# Patient Record
Sex: FEMALE | Race: WHITE | NOT HISPANIC OR LATINO | Employment: OTHER | URBAN - METROPOLITAN AREA
[De-identification: names, ages, dates, MRNs, and addresses within clinical notes are randomized per-mention and may not be internally consistent; named-entity substitution may affect disease eponyms.]

---

## 2017-03-01 ENCOUNTER — TRANSFERRED RECORDS (OUTPATIENT)
Dept: FAMILY MEDICINE | Facility: CLINIC | Age: 47
End: 2017-03-01

## 2017-03-02 LAB — CALCIUM SERPL-MCNC: 9.6 MG/DL (ref 8.6–10.2)

## 2017-03-04 ENCOUNTER — TRANSFERRED RECORDS (OUTPATIENT)
Dept: FAMILY MEDICINE | Facility: CLINIC | Age: 47
End: 2017-03-04

## 2017-03-07 ENCOUNTER — TRANSFERRED RECORDS (OUTPATIENT)
Dept: FAMILY MEDICINE | Facility: CLINIC | Age: 47
End: 2017-03-07

## 2017-03-13 ENCOUNTER — TRANSFERRED RECORDS (OUTPATIENT)
Dept: FAMILY MEDICINE | Facility: CLINIC | Age: 47
End: 2017-03-13

## 2017-03-13 LAB
25 OH VIT D TOTAL: 36 (ref 30–100)
ALT SERPL-CCNC: 10 U/L (ref 0–32)
AST SERPL-CCNC: 10 U/L (ref 0–40)
BASOPHILS NFR BLD AUTO: 1 %
EOSINOPHIL NFR BLD AUTO: 2 %
ERYTHROCYTE [DISTWIDTH] IN BLOOD BY AUTOMATED COUNT: 15 %
HCT VFR BLD AUTO: 37.9 % (ref 35–47)
HEMOGLOBIN: 12.6 G/DL (ref 11.7–15.7)
LYMPHOCYTES NFR BLD AUTO: 27 %
MCH RBC QN AUTO: 29.8 PG (ref 26–33)
MCHC RBC AUTO-ENTMCNC: 33.2 G/DL (ref 31–36)
MCV RBC AUTO: 90 FL (ref 78–100)
MONOCYTES NFR BLD AUTO: 6 %
NEUTROPHILS NFR BLD AUTO: 64 %
PLATELET COUNT - QUEST: 292 10^9/L (ref 150–375)
RBC # BLD AUTO: 4.23 10*12/L (ref 3.8–5.2)
T4 FREE SERPL-MCNC: 1.1 NG/DL (ref 0.82–1.77)
TSH SERPL-ACNC: 2.19 MCU/ML (ref 0.45–4.5)
WBC # BLD AUTO: 7.4 10*9/L (ref 4–11)

## 2017-03-20 ENCOUNTER — TRANSFERRED RECORDS (OUTPATIENT)
Dept: FAMILY MEDICINE | Facility: CLINIC | Age: 47
End: 2017-03-20

## 2017-04-15 ENCOUNTER — OFFICE VISIT (OUTPATIENT)
Dept: FAMILY MEDICINE | Facility: CLINIC | Age: 47
End: 2017-04-15

## 2017-04-15 VITALS
SYSTOLIC BLOOD PRESSURE: 120 MMHG | TEMPERATURE: 98.2 F | RESPIRATION RATE: 16 BRPM | OXYGEN SATURATION: 99 % | HEART RATE: 76 BPM | DIASTOLIC BLOOD PRESSURE: 80 MMHG

## 2017-04-15 DIAGNOSIS — G35 MS (MULTIPLE SCLEROSIS) (H): ICD-10-CM

## 2017-04-15 DIAGNOSIS — Z20.818 EXPOSURE TO STREP THROAT: Primary | ICD-10-CM

## 2017-04-15 LAB — S PYO AG THROAT QL IA.RAPID: NORMAL

## 2017-04-15 PROCEDURE — 87880 STREP A ASSAY W/OPTIC: CPT | Performed by: FAMILY MEDICINE

## 2017-04-15 PROCEDURE — 87070 CULTURE OTHR SPECIMN AEROBIC: CPT | Performed by: FAMILY MEDICINE

## 2017-04-15 PROCEDURE — 99213 OFFICE O/P EST LOW 20 MIN: CPT | Performed by: FAMILY MEDICINE

## 2017-04-15 NOTE — PROGRESS NOTES
SUBJECTIVE:   Ida Gayle is a 46 year old female who complains of exposure to strep in grandson last night-he was just diagnosed this am. She denies a history of productive cough, sweats, chills, myalgias, nausea, vomiting and sore throat and denies a history of asthma. Patient does not smoke cigarettes.    Pt recently diagnosed with MS and started on Rebif last week-worries she will get strep    Patient Active Problem List   Diagnosis     Health Care Home     ACP (advance care planning)     Major depressive disorder, recurrent episode, in full remission (H)     MS (multiple sclerosis) (H)- Dr Bolanos     No past medical history on file.  Family History   Problem Relation Age of Onset     DIABETES No family hx of      C.A.D. No family hx of      Prostate Cancer Father      Breast Cancer No family hx of      Cancer - colorectal No family hx of      CANCER Maternal Grandmother      skin cancer     Social History     Social History     Marital status:      Spouse name: N/A     Number of children: 1     Years of education: N/A     Occupational History     make- up for weddings      Social History Main Topics     Smoking status: Never Smoker     Smokeless tobacco: Never Used     Alcohol use Yes     Drug use: No     Sexual activity: Not Currently     Partners: Male     Other Topics Concern      Service No     Blood Transfusions No     Caffeine Concern No     Occupational Exposure No     Hobby Hazards No     Sleep Concern No     Stress Concern Yes     second marriage with children issues     Weight Concern Yes     Special Diet No     Exercise Yes     2 times per week     Seat Belt Yes     Social History Narrative     Past Surgical History:   Procedure Laterality Date     C REIMPLANT URETER,DUPLICATED URETER  1974     CYSTECTOMY OVARIAN BENIGN  2010       Current Outpatient Prescriptions on File Prior to Visit:  citalopram (CELEXA) 20 MG tablet    hydrocortisone (WESTCORT) 0.2 % cream Apply sparingly  to affected area at bedtime     No current facility-administered medications on file prior to visit.      Allergies: Flagyl [metronidazole] and Sulfa drugs    Immunization History   Administered Date(s) Administered     Influenza (IIV3) 11/09/2005     TD (ADULT, 7+) 01/01/1997     Tdap (Adacel,Boostrix) 08/13/2007         OBJECTIVE:/80 (BP Location: Left arm, Patient Position: Chair, Cuff Size: Adult Large)  Pulse 76  Temp 98.2  F (36.8  C) (Oral)  Resp 16  LMP 04/15/2017  SpO2 99%   She appears well, vital signs are as noted by the nurse. Ears normal.  Throat and pharynx normal.  Neck supple. No adenopathy in the neck. Nose is congested. Sinuses non tender. The chest is clear, without wheezes or rales.    ASSESSMENT:   Strep Exposure-potentially higher risk for infection as started on immune modulating meds last week-pt very much would like to be tested    PLAN:strep culture sent  Symptomatic therapy suggested: push fluids. Call or return to clinic prn if these symptoms worsen or fail to improve as anticipated.

## 2017-04-15 NOTE — NURSING NOTE
Patient was with her grandson last night and found out that he has strep - she was recently dx with MS and wants to make sure she does not get it.  Pre-Visit Screening not done today.  Pulse - regular  My Chart - declines    CLASSIFICATION OF OVERWEIGHT AND OBESITY BY BMI                         Obesity Class           BMI(kg/m2)  Underweight                                    < 18.5  Normal                                         18.5-24.9  Overweight                                     25.0-29.9  OBESITY                     I                  30.0-34.9                              II                 35.0-39.9  EXTREME OBESITY             III                >40                             Patient's  BMI There is no height or weight on file to calculate BMI.  http://hin.nhlbi.nih.gov/menuplanner/menu.cgi  Questioned patient about current smoking habits.  Pt. has never smoked.      Patient refused to have their weight and height checked and because of this we are unable to calculate a BMI.

## 2017-04-17 LAB — THROAT CULTURE: NORMAL

## 2017-04-25 PROBLEM — G31.84 MCI (MILD COGNITIVE IMPAIRMENT): Status: ACTIVE | Noted: 2017-04-25

## 2017-04-26 ENCOUNTER — TELEPHONE (OUTPATIENT)
Dept: FAMILY MEDICINE | Facility: CLINIC | Age: 47
End: 2017-04-26

## 2017-07-22 ENCOUNTER — HEALTH MAINTENANCE LETTER (OUTPATIENT)
Age: 47
End: 2017-07-22

## 2017-11-21 ENCOUNTER — OFFICE VISIT (OUTPATIENT)
Dept: FAMILY MEDICINE | Facility: CLINIC | Age: 47
End: 2017-11-21

## 2017-11-21 VITALS
SYSTOLIC BLOOD PRESSURE: 128 MMHG | TEMPERATURE: 98.8 F | RESPIRATION RATE: 20 BRPM | HEART RATE: 88 BPM | DIASTOLIC BLOOD PRESSURE: 80 MMHG

## 2017-11-21 DIAGNOSIS — J01.00 ACUTE NON-RECURRENT MAXILLARY SINUSITIS: Primary | ICD-10-CM

## 2017-11-21 DIAGNOSIS — B37.31 YEAST INFECTION OF THE VAGINA: ICD-10-CM

## 2017-11-21 PROCEDURE — 99213 OFFICE O/P EST LOW 20 MIN: CPT | Performed by: PHYSICIAN ASSISTANT

## 2017-11-21 RX ORDER — AMOXICILLIN 875 MG
875 TABLET ORAL 2 TIMES DAILY
Qty: 20 TABLET | Refills: 0 | Status: SHIPPED | OUTPATIENT
Start: 2017-11-21 | End: 2017-12-11

## 2017-11-21 RX ORDER — FLUCONAZOLE 150 MG/1
150 TABLET ORAL ONCE
Qty: 1 TABLET | Refills: 0 | Status: SHIPPED | OUTPATIENT
Start: 2017-11-21 | End: 2017-11-21

## 2017-11-21 NOTE — MR AVS SNAPSHOT
"              After Visit Summary   2017    Ida Gayle    MRN: [id number]           Patient Information     Date Of Birth          1970        Visit Information        Provider Department      2017 10:00 AM Karey King PA [facility]        Today's Diagnoses     Acute non-recurrent maxillary sinusitis    -  1    Yeast infection of the vagina           Follow-ups after your visit        Who to contact     If you have questions or need follow up information about today's clinic visit or your schedule please contact [facility] directly at [phone].  Normal or non-critical lab and imaging results will be communicated to you by Wavesathart, letter or phone within 4 business days after the clinic has received the results. If you do not hear from us within 7 days, please contact the clinic through Wavesathart or phone. If you have a critical or abnormal lab result, we will notify you by phone as soon as possible.  Submit refill requests through [employer] or call your pharmacy and they will forward the refill request to us. Please allow 3 business days for your refill to be completed.          Additional Information About Your Visit        MyChart Information     [employer] lets you send messages to your doctor, view your test results, renew your prescriptions, schedule appointments and more. To sign up, go to [email or web address] . Click on \"Log in\" on the left side of the screen, which will take you to the Welcome page. Then click on \"Sign up Now\" on the right side of the page.     You will be asked to enter the access code listed below, as well as some personal information. Please follow the directions to create your username and password.     Your access code is: [email or web address]  Expires: 2018  9:37 AM     Your access code will  in 90 days. If you need help or a new code, please call your [place] clinic or [phone].      "   Care EveryWhere ID     This is your Care EveryWhere ID. This could be used by other organizations to access your [place] medical records  [id number]        Your Vitals Were     Pulse Temperature Respirations Last Period Breastfeeding?       88 98.8  F (37.1  C) (Oral) 20 11/14/2017 No        Blood Pressure from Last 3 Encounters:   11/21/17 128/80   04/15/17 120/80   06/22/16 110/70    Weight from Last 3 Encounters:   06/22/16 124.3 kg (274 lb)   06/07/16 116.6 kg (257 lb)   08/21/14 105.2 kg (232 lb)              Today, you had the following     No orders found for display         Today's Medication Changes          These changes are accurate as of: 11/21/17 11:59 PM.  If you have any questions, ask your nurse or doctor.               Start taking these medicines.        Dose/Directions    amoxicillin 875 MG tablet   Commonly known as:  AMOXIL   Used for:  Acute non-recurrent maxillary sinusitis   Started by:  Karey King PA        Dose:  875 mg   Take 1 tablet (875 mg) by mouth 2 times daily   Quantity:  20 tablet   Refills:  0       fluconazole 150 MG tablet   Commonly known as:  DIFLUCAN   Used for:  Yeast infection of the vagina   Started by:  Karey King PA        Dose:  150 mg   Take 1 tablet (150 mg) by mouth once for 1 dose   Quantity:  1 tablet   Refills:  0            Where to get your medicines      These medications were sent to [facility] [address] [place] [address] [address] AT [place] [address] [address] [facility] [email or web address]  [address], [facility] [address]     Phone:  [phone]     amoxicillin 875 MG tablet    fluconazole 150 MG tablet                Primary Care Provider Office Phone # Fax #    Raffaele Cisneros [phone] [address] [address]        Equal Access to Services     PRAFUL LUCAS AH: Odalys Neal, rukhsana hebert, qaybkat kaneelam garcia, jose escobedo  shahram agarwalnitishalicja joyner'aatorsten ah. So [facility] [phone].    ATENCIÓN: Si shyam rodriguez, tiene a reyes disposición servicios gratuitos de asistencia lingüística. Delroy al [phone].    We comply with applicable federal civil rights laws and [place] laws. We do not discriminate on the basis of race, color, national origin, age, disability, sex, sexual orientation, or gender identity.            Thank you!     Thank you for choosing [facility]  for your care. Our goal is always to provide you with excellent care. Hearing back from our patients is one way we can continue to improve our services. Please take a few minutes to complete the written survey that you may receive in the mail after your visit with us. Thank you!             Your Updated Medication List - Protect others around you: Learn how to safely use, store and throw away your medicines at www.disposemymeds.org.          This list is accurate as of: 11/21/17 11:59 PM.  Always use your most recent med list.                   Brand Name Dispense Instructions for use Diagnosis    amoxicillin 875 MG tablet    AMOXIL    20 tablet    Take 1 tablet (875 mg) by mouth 2 times daily    Acute non-recurrent maxillary sinusitis       fluconazole 150 MG tablet    DIFLUCAN    1 tablet    Take 1 tablet (150 mg) by mouth once for 1 dose    Yeast infection of the vagina       hydrocortisone 0.2 % cream    WESTCORT    45 g    Apply sparingly to affected area at bedtime    Eczema       REBIF SC           tiZANidine 4 MG tablet    ZANAFLEX    90 tablet    Take 1 tablet (4 mg) by mouth At Bedtime

## 2017-11-21 NOTE — NURSING NOTE
Ida Gayle is here for a possible sinus infection. C/O sinus pain, pressure, HA and green sinus drainage.  Questioned patient about current smoking habits.  Pt. has never smoked.  PULSE regular  My Chart:   CLASSIFICATION OF OVERWEIGHT AND OBESITY BY BMI                        Obesity Class           BMI(kg/m2)  Underweight                                    < 18.5  Normal                                         18.5-24.9  Overweight                                     25.0-29.9  OBESITY                     I                  30.0-34.9                             II                 35.0-39.9  EXTREME OBESITY             III                >40                            Patient's  BMI There is no height or weight on file to calculate BMI.  http://hin.nhlbi.nih.gov/menuplanner/menu.cgi  Pre-visit planning  Immunizations - up to date  Colonoscopy -   Mammogram -   Asthma -   PHQ9 -    XANDER-7 -

## 2017-12-11 ENCOUNTER — OFFICE VISIT (OUTPATIENT)
Dept: FAMILY MEDICINE | Facility: CLINIC | Age: 47
End: 2017-12-11

## 2017-12-11 VITALS
HEART RATE: 73 BPM | DIASTOLIC BLOOD PRESSURE: 88 MMHG | TEMPERATURE: 98.9 F | OXYGEN SATURATION: 98 % | SYSTOLIC BLOOD PRESSURE: 134 MMHG

## 2017-12-11 DIAGNOSIS — J01.90 ACUTE SINUSITIS WITH SYMPTOMS > 10 DAYS: Primary | ICD-10-CM

## 2017-12-11 PROCEDURE — 99213 OFFICE O/P EST LOW 20 MIN: CPT | Performed by: PHYSICIAN ASSISTANT

## 2017-12-11 NOTE — NURSING NOTE
Ida is here for a cold that pt has had for a few weeks but now has developed into a sinus issue. Pt states she has had sinus pain and really bad pressure and headaches, pt states her headache is a continual headache, and right ear issues that pt describes as bubbly and a little painful    Pre-Visit Screening :  Immunizations : not up to date - postponed flu and tdap    Colonoscopy : na  Mammogram : is up to date  Asthma Action Test/Plan : na  PHQ9/GAD7 :  Na    Pulse - regular  My Chart - declines    CLASSIFICATION OF OVERWEIGHT AND OBESITY BY BMI                         Obesity Class           BMI(kg/m2)  Underweight                                    < 18.5  Normal                                         18.5-24.9  Overweight                                     25.0-29.9  OBESITY                     I                  30.0-34.9                              II                 35.0-39.9  EXTREME OBESITY             III                >40                             Patient's  BMI Body mass index is 22.15 kg/(m^2).  http://hin.nhlbi.nih.gov/menuplanner/menu.cgi  Questioned patient about current smoking habits.  Pt. has never smoked.  The patient has verbalized that it is ok to leave a detailed voice message on the patient's cell phone with results/recommendations from this visit.       Verified [phone] phone number:

## 2017-12-11 NOTE — PROGRESS NOTES
Chief Complaint   Patient presents with     Sinus Problem     pt had a cold a few weeks but now has sinus pain and pressure and headaches     Ear Problem     right ear pain X 1-2 days     S:  Ida Gayle is a 47 year old female presents with complaints of sinus problems for 7 days.    Was on amox for sinusitis  Took only for 4 days and stopped b/c she was feeling better and had AE  Developed diarrhea and yeast infection so she stopped    Right ear pain.         No past medical history on file.  Family History   Problem Relation Age of Onset     DIABETES No family hx of      C.A.D. No family hx of      Prostate Cancer Father      Breast Cancer No family hx of      Cancer - colorectal No family hx of      CANCER Maternal Grandmother      skin cancer     Social History     Social History     Marital status:      Spouse name: N/A     Number of children: 1     Years of education: N/A     Occupational History     make- up for weddings      Social History Main Topics     Smoking status: Never Smoker     Smokeless tobacco: Never Used     Alcohol use Yes     Drug use: No     Sexual activity: Not Currently     Partners: Male     Other Topics Concern      Service No     Blood Transfusions No     Caffeine Concern No     Occupational Exposure No     Hobby Hazards No     Sleep Concern No     Stress Concern Yes     second marriage with children issues     Weight Concern Yes     Special Diet No     Exercise Yes     2 times per week     Seat Belt Yes     Social History Narrative     Patient Active Problem List   Diagnosis     Health Care Home     ACP (advance care planning)     Major depressive disorder, recurrent episode, in full remission (H)     MS (multiple sclerosis) (H)- Dr Bolanos     MCI (mild cognitive impairment)     Current Outpatient Prescriptions   Medication     amoxicillin-clavulanate (AUGMENTIN) 875-125 MG per tablet     tiZANidine (ZANAFLEX) 4 MG tablet     Interferon Beta-1a (REBIF SC)      hydrocortisone (WESTCORT) 0.2 % cream     No current facility-administered medications for this visit.      No current facility-administered medications for this visit.       Allergen Reactions     Flagyl [Metronidazole] Hives     Sulfa Drugs          O:   /88 (BP Location: Right arm, Patient Position: Chair, Cuff Size: Adult Large)  Pulse 73  Temp 98.9  F (37.2  C) (Oral)  LMP 11/14/2017  SpO2 98%  Breastfeeding? No      General: The patient is in no apparent distress  Head: Normocephalic, atraumatic.  Eyes: Conjunctiva clear.  No discharge noted. There is no periorbital swelling.   Ears: External ears, canals and TMs normal BL.  Nose: Septum midline, nasal mucosa pink and moist. No discharge.  Sinuses: Tenderness to palpation - bilateral maxillary  Mouth / Throat:  No oral lesions. Pharynx non erythematous, no exudates, tonsils without hypertrophy. Mucous membranes moist.  Neck: No thyroid nodules, no  thyromegaly.  Neck supple, No anterior or posterior cervical lymphadenopathy.  Cardiac:  Normal rhythm and rate, no murmurs, rubs or gallops.  Lungs: clear to auscultation bilaterally, no wheezes, crackles or rhonchi        A:   1. Acute sinusitis with symptoms > 10 days          P: Symptomatic treatment consisting of increased fluids, OTC analgesia, Saline rinse if copious rhinorrhea, Vicks Vapo Rub.     Augmentin prescribed  Discussed AE  Monistat OTC  Discussed abx resistance without full course meds  Will get CT sinuses if not improving.    The patient is instructed to call if symptoms not improving, worsen, or fail to resolve after antibiotic treatment if antibiotics were prescribed.    Karey King

## 2017-12-11 NOTE — MR AVS SNAPSHOT
After Visit Summary   12/11/2017    Ida Gayle    MRN: [id number]           Patient Information     Date Of Birth          1970        Visit Information        Provider Department      12/11/2017 2:00 PM Karey King PA [facility]        Today's Diagnoses     Acute sinusitis with symptoms > 10 days    -  1       Follow-ups after your visit        Who to contact     If you have questions or need follow up information about today's clinic visit or your schedule please contact [facility] directly at [phone].  Normal or non-critical lab and imaging results will be communicated to you by MyChart, letter or phone within 4 business days after the clinic has received the results. If you do not hear from us within 7 days, please contact the clinic through MyChart or phone. If you have a critical or abnormal lab result, we will notify you by phone as soon as possible.  Submit refill requests through [employer] or call your pharmacy and they will forward the refill request to us. Please allow 3 business days for your refill to be completed.          Additional Information About Your Visit        Care EveryWhere ID     This is your Care EveryWhere ID. This could be used by other organizations to access your [place] medical records  [id number]        Your Vitals Were     Pulse Temperature Last Period Pulse Oximetry Breastfeeding?       73 98.9  F (37.2  C) (Oral) 11/14/2017 98% No        Blood Pressure from Last 3 Encounters:   12/11/17 134/88   11/21/17 128/80   04/15/17 120/80    Weight from Last 3 Encounters:   06/22/16 124.3 kg (274 lb)   06/07/16 116.6 kg (257 lb)   08/21/14 105.2 kg (232 lb)              Today, you had the following     No orders found for display         Today's Medication Changes          These changes are accurate as of: 12/11/17 11:59 PM.  If you have any questions, ask your nurse or doctor.                Start taking these medicines.        Dose/Directions    amoxicillin-clavulanate 875-125 MG per tablet   Commonly known as:  AUGMENTIN   Used for:  Acute sinusitis with symptoms > 10 days   Started by:  Karey King PA        Dose:  1 tablet   Take 1 tablet by mouth 2 times daily   Quantity:  20 tablet   Refills:  0            Where to get your medicines      These medications were sent to [id number] - [facility] [address] AT [facility] [address]  [address], [facility] [address]     Phone:  [phone]     amoxicillin-clavulanate 875-125 MG per tablet                Primary Care Provider Office Phone # Fax #    Raffaele Cisneros [phone] [address]  [facility] [address]        Equal Access to Services     [facility] RosaKAELA : Hadii aad ku hadasho Soomaali, waaxda luqadaha, qaybta kaalmada adeegyada, waxay maryin hayaan shahram dubon . So [facility] [phone].    ATENCIÓN: Si habla español, tiene a reyes disposición servicios gratuitos de asistencia lingüística. [facility] [phone].    We comply with applicable federal civil rights laws and [place] laws. We do not discriminate on the basis of race, color, national origin, age, disability, sex, sexual orientation, or gender identity.            Thank you!     Thank you for choosing [facility]  for your care. Our goal is always to provide you with excellent care. Hearing back from our patients is one way we can continue to improve our services. Please take a few minutes to complete the written survey that you may receive in the mail after your visit with us. Thank you!             Your Updated Medication List - Protect others around you: Learn how to safely use, store and throw away your medicines at www.disposemymeds.org.          This list is accurate as of: 12/11/17 11:59 PM.  Always use your most recent med list.                    Brand Name Dispense Instructions for use Diagnosis    amoxicillin-clavulanate 875-125 MG per tablet    AUGMENTIN    20 tablet    Take 1 tablet by mouth 2 times daily    Acute sinusitis with symptoms > 10 days       hydrocortisone 0.2 % cream    WESTCORT    45 g    Apply sparingly to affected area at bedtime    Eczema       REBIF SC           tiZANidine 4 MG tablet    ZANAFLEX    90 tablet    Take 1 tablet (4 mg) by mouth At Bedtime

## 2018-01-30 ENCOUNTER — OFFICE VISIT (OUTPATIENT)
Dept: FAMILY MEDICINE | Facility: CLINIC | Age: 48
End: 2018-01-30

## 2018-01-30 VITALS
TEMPERATURE: 98.9 F | DIASTOLIC BLOOD PRESSURE: 80 MMHG | SYSTOLIC BLOOD PRESSURE: 132 MMHG | HEART RATE: 89 BPM | OXYGEN SATURATION: 98 %

## 2018-01-30 DIAGNOSIS — J01.00 ACUTE MAXILLARY SINUSITIS, RECURRENCE NOT SPECIFIED: Primary | ICD-10-CM

## 2018-01-30 DIAGNOSIS — H66.002 ACUTE SUPPURATIVE OTITIS MEDIA OF LEFT EAR WITHOUT SPONTANEOUS RUPTURE OF TYMPANIC MEMBRANE, RECURRENCE NOT SPECIFIED: ICD-10-CM

## 2018-01-30 PROCEDURE — 99213 OFFICE O/P EST LOW 20 MIN: CPT | Performed by: FAMILY MEDICINE

## 2018-01-30 NOTE — MR AVS SNAPSHOT
After Visit Summary   1/30/2018    Ida Gayle    MRN: [id number]           Patient Information     Date Of Birth          1970        Visit Information        Provider Department      1/30/2018 12:15 PM Raffaele Cisneros MD [facility]        Today's Diagnoses     Acute maxillary sinusitis, recurrence not specified    -  1    Acute suppurative otitis media of left ear without spontaneous rupture of tympanic membrane, recurrence not specified           Follow-ups after your visit        Who to contact     If you have questions or need follow up information about today's clinic visit or your schedule please contact [facility] directly at [phone].  Normal or non-critical lab and imaging results will be communicated to you by MyChart, letter or phone within 4 business days after the clinic has received the results. If you do not hear from us within 7 days, please contact the clinic through MyChart or phone. If you have a critical or abnormal lab result, we will notify you by phone as soon as possible.  Submit refill requests through Affectv or call your pharmacy and they will forward the refill request to us. Please allow 3 business days for your refill to be completed.          Additional Information About Your Visit        Care EveryWhere ID     This is your Care EveryWhere ID. This could be used by other organizations to access your [place] medical records  [id number]        Your Vitals Were     Pulse Temperature Pulse Oximetry Breastfeeding?          89 98.9  F (37.2  C) (Oral) 98% No         Blood Pressure from Last 3 Encounters:   01/30/18 132/80   12/11/17 134/88   11/21/17 128/80    Weight from Last 3 Encounters:   06/22/16 124.3 kg (274 lb)   06/07/16 116.6 kg (257 lb)   08/21/14 105.2 kg (232 lb)              Today, you had the following     No orders found for display         Today's Medication Changes          These  changes are accurate as of 1/30/18 12:34 PM.  If you have any questions, ask your nurse or doctor.               Start taking these medicines.        Dose/Directions    amoxicillin-clavulanate 875-125 MG per tablet   Commonly known as:  AUGMENTIN   Used for:  Acute maxillary sinusitis, recurrence not specified, Acute suppurative otitis media of left ear without spontaneous rupture of tympanic membrane, recurrence not specified   Started by:  Raffaele Cisneros MD        Dose:  1 tablet   Take 1 tablet by mouth 2 times daily   Quantity:  20 tablet   Refills:  0            Where to get your medicines      These medications were sent to [facility] [address] [place], [place] - [address] AT [facility] [address]  [address], [facility] [address]     Phone:  [phone]     amoxicillin-clavulanate 875-125 MG per tablet                Primary Care Provider Office Phone # Fax #    Raffaele Cisneros [phone] [address] [facility] [address]  [facility] [address]        Equal Access to Services     [facility] AH: Hadii aad ku hadasho Soomaali, waaxda luqadaha, qaybta kaalmada adeegyada, waxay idiin hayaan shahram dubon . So [facility] [phone].    ATENCIÓN: Si habla español, tiene a reyes disposición servicios gratuitos de asistencia lingüística. [facility] [phone].    We comply with applicable federal civil rights laws and [place] laws. We do not discriminate on the basis of race, color, national origin, age, disability, sex, sexual orientation, or gender identity.            Thank you!     Thank you for choosing [facility]  for your care. Our goal is always to provide you with excellent care. Hearing back from our patients is one way we can continue to improve our services. Please take a few minutes to complete the written survey that you may receive in the mail after your visit with us. Thank you!             Your  Updated Medication List - Protect others around you: Learn how to safely use, store and throw away your medicines at www.disposemymeds.org.          This list is accurate as of 1/30/18 12:34 PM.  Always use your most recent med list.                   Brand Name Dispense Instructions for use Diagnosis    amoxicillin-clavulanate 875-125 MG per tablet    AUGMENTIN    20 tablet    Take 1 tablet by mouth 2 times daily    Acute maxillary sinusitis, recurrence not specified, Acute suppurative otitis media of left ear without spontaneous rupture of tympanic membrane, recurrence not specified       hydrocortisone 0.2 % cream    WESTCORT    45 g    Apply sparingly to affected area at bedtime    Eczema       REBIF SC           tiZANidine 4 MG tablet    ZANAFLEX    90 tablet    Take 1 tablet (4 mg) by mouth At Bedtime

## 2018-01-30 NOTE — PROGRESS NOTES
SUBJECTIVE:   Ida Gayle is a 47 year old female who complains of nasal congestion, nasal blockage, yellow and thick nasal discharge, facial pressure, right sinus pain and bilateral ear pressure for 8+ days-started while in [place]-worse after plane ride. She denies a history of productive cough, shortness of breath, vomiting and chest pain and denies a history of asthma. Patient does not smoke cigarettes.    Pt scheduled to start new MS infusion therapy 2/16/18 and cannot have an infection at that time    Patient Active Problem List   Diagnosis     Health Care Home     ACP (advance care planning)     Major depressive disorder, recurrent episode, in full remission (H)     MS (multiple sclerosis) (H)- Dr Bolanos     MCI (mild cognitive impairment)     No past medical history on file.  Family History   Problem Relation Age of Onset     DIABETES No family hx of      C.A.D. No family hx of      Prostate Cancer Father      Breast Cancer No family hx of      Cancer - colorectal No family hx of      CANCER Maternal Grandmother      skin cancer     Social History     Social History     Marital status:      Spouse name: N/A     Number of children: 1     Years of education: N/A     Occupational History     make- up for weddings      Social History Main Topics     Smoking status: Never Smoker     Smokeless tobacco: Never Used     Alcohol use Yes     Drug use: No     Sexual activity: Not Currently     Partners: Male     Other Topics Concern      Service No     Blood Transfusions No     Caffeine Concern No     Occupational Exposure No     Hobby Hazards No     Sleep Concern No     Stress Concern Yes     second marriage with children issues     Weight Concern Yes     Special Diet No     Exercise Yes     2 times per week     Seat Belt Yes     Social History Narrative     Past Surgical History:   Procedure Laterality Date     C REIMPLANT URETER,DUPLICATED URETER  1974     CYSTECTOMY OVARIAN BENIGN  2010        Current Outpatient Prescriptions on File Prior to Visit:  tiZANidine (ZANAFLEX) 4 MG tablet Take 1 tablet (4 mg) by mouth At Bedtime   Interferon Beta-1a (REBIF SC)    hydrocortisone (WESTCORT) 0.2 % cream Apply sparingly to affected area at bedtime     No current facility-administered medications on file prior to visit.      Allergies: Flagyl [metronidazole] and Sulfa drugs    Immunization History   Administered Date(s) Administered     Influenza (IIV3) PF 11/09/2005     TD (ADULT, 7+) 01/01/1997     Tdap (Adacel,Boostrix) 08/13/2007         OBJECTIVE:  She appears well, vital signs are as noted by the nurse. EARS: - abnormal: R TM normal and dull; L TM erythematous and bulging. .  Throat and pharynx normal.  Neck supple. No adenopathy in the neck. Nose is congested. Sinuses non tender. The chest is clear, without wheezes or rales.    ASSESSMENT:   Sinusitis  Left AOM    PLAN:augmentin, I reviewed the risks, benefits, and possible side effects of the medication.  The patient had an opportunity to ask any questions regarding the treatment plan. The patient was encouraged to call my office if any problems.    Symptomatic therapy suggested: push fluids, rest and use acetaminophen, ibuprofen as needed. Call or return to clinic prn if these symptoms worsen or fail to improve as anticipated.

## 2018-01-30 NOTE — NURSING NOTE
Ida is here for sinus issues X 1 week, pt states she was on a plane last night and after her flight she had ear pressure and aches and sinus pressure, but had a cold prior to flight     Pre-Visit Screening :  Immunizations : up to date    Colonoscopy : na  Mammogram : is up to date  Asthma Action Test/Plan : na  PHQ9/GAD7 :  Na    Pulse - regular  My Chart - declines    CLASSIFICATION OF OVERWEIGHT AND OBESITY BY BMI                         Obesity Class           BMI(kg/m2)  Underweight                                    < 18.5  Normal                                         18.5-24.9  Overweight                                     25.0-29.9  OBESITY                     I                  30.0-34.9                              II                 35.0-39.9  EXTREME OBESITY             III                >40                             Patient's  BMI Body mass index is 22.15 kg/(m^2).  http://hin.nhlbi.nih.gov/menuplanner/menu.cgi  Questioned patient about current smoking habits.  Pt. has never smoked.  The patient has verbalized that it is ok to leave a detailed voice message on the patient's cell phone with results/recommendations from this visit.       Verified [phone] phone number:

## 2018-03-28 ENCOUNTER — OFFICE VISIT (OUTPATIENT)
Dept: FAMILY MEDICINE | Facility: CLINIC | Age: 48
End: 2018-03-28

## 2018-03-28 VITALS
HEART RATE: 106 BPM | OXYGEN SATURATION: 97 % | DIASTOLIC BLOOD PRESSURE: 82 MMHG | TEMPERATURE: 100.8 F | SYSTOLIC BLOOD PRESSURE: 128 MMHG

## 2018-03-28 DIAGNOSIS — R07.0 THROAT PAIN: ICD-10-CM

## 2018-03-28 DIAGNOSIS — R68.83 CHILLS (WITHOUT FEVER): Primary | ICD-10-CM

## 2018-03-28 DIAGNOSIS — R05.9 COUGH: ICD-10-CM

## 2018-03-28 LAB
% GRANULOCYTES: 69.5 %
FLUAV AG UPPER RESP QL IA.RAPID: NORMAL
FLUBV AG UPPER RESP QL IA.RAPID: NORMAL
HCT VFR BLD AUTO: 35.9 % (ref 35–47)
HEMOGLOBIN: 13 G/DL (ref 11.7–15.7)
LYMPHOCYTES NFR BLD AUTO: 21.3 %
MCH RBC QN AUTO: 32.9 PG (ref 26–33)
MCHC RBC AUTO-ENTMCNC: 36.2 G/DL (ref 31–36)
MCV RBC AUTO: 90.8 FL (ref 78–100)
MONOCYTES NFR BLD AUTO: 9.2 %
PLATELET COUNT - QUEST: 329 10^9/L (ref 150–375)
RBC # BLD AUTO: 3.95 10*12/L (ref 3.8–5.2)
S PYO AG THROAT QL IA.RAPID: NORMAL
WBC # BLD AUTO: 9.6 10*9/L (ref 4–11)

## 2018-03-28 PROCEDURE — 99213 OFFICE O/P EST LOW 20 MIN: CPT | Performed by: PHYSICIAN ASSISTANT

## 2018-03-28 PROCEDURE — 71046 X-RAY EXAM CHEST 2 VIEWS: CPT | Performed by: PHYSICIAN ASSISTANT

## 2018-03-28 PROCEDURE — 87880 STREP A ASSAY W/OPTIC: CPT | Performed by: PHYSICIAN ASSISTANT

## 2018-03-28 PROCEDURE — 87070 CULTURE OTHR SPECIMN AEROBIC: CPT | Performed by: PHYSICIAN ASSISTANT

## 2018-03-28 PROCEDURE — 36415 COLL VENOUS BLD VENIPUNCTURE: CPT | Performed by: PHYSICIAN ASSISTANT

## 2018-03-28 PROCEDURE — 87804 INFLUENZA ASSAY W/OPTIC: CPT | Performed by: PHYSICIAN ASSISTANT

## 2018-03-28 PROCEDURE — 85025 COMPLETE CBC W/AUTO DIFF WBC: CPT | Performed by: PHYSICIAN ASSISTANT

## 2018-03-28 NOTE — NURSING NOTE
Ida is here for chest pain and tightness that began two days ago, (pt also vomited Sunday night), pt states it feels like its burning and she also started an infusion treatment last month on Ocrevus, other sx, headache, chills, coughing up phlegm, body aches- but not sure if that's ms, a little sinus pain and pressure began today.  A nurse at [facility] clinic neurology said to call her if you have any questions about her infusion treatments and she recommended if january needs a rx for anything to double it because her infusion can counter act her rx, January can be reached at [phone]    Pre-Visit Screening :  Immunizations : up to date    Colonoscopy : na  Mammogram : is up to date  Asthma Action Test/Plan : na  PHQ9/GAD7 :  Na    Pulse - regular  My Chart - declines    CLASSIFICATION OF OVERWEIGHT AND OBESITY BY BMI                         Obesity Class           BMI(kg/m2)  Underweight                                    < 18.5  Normal                                         18.5-24.9  Overweight                                     25.0-29.9  OBESITY                     I                  30.0-34.9                              II                 35.0-39.9  EXTREME OBESITY             III                >40                             Patient's  BMI Body mass index is 22.15 kg/(m^2).  http://hin.nhlbi.nih.gov/menuplanner/menu.cgi  Questioned patient about current smoking habits.  Pt. has never smoked.  The patient has verbalized that it is ok to leave a detailed voice message on the patient's home voicemail with results/recommendations from this visit.       Verified [phone]  phone number:

## 2018-03-28 NOTE — MR AVS SNAPSHOT
"              After Visit Summary   3/28/2018    Ida Gayle    MRN: [id number]           Patient Information     Date Of Birth          1970        Visit Information        Provider Department      3/28/2018 12:30 PM Karey King PA [facility]        Today's Diagnoses     Chills (without fever)    -  1    Cough        Throat pain           Follow-ups after your visit        Who to contact     If you have questions or need follow up information about today's clinic visit or your schedule please contact [facility] directly at [phone].  Normal or non-critical lab and imaging results will be communicated to you by Globaliahart, letter or phone within 4 business days after the clinic has received the results. If you do not hear from us within 7 days, please contact the clinic through Globaliahart or phone. If you have a critical or abnormal lab result, we will notify you by phone as soon as possible.  Submit refill requests through Lab4U or call your pharmacy and they will forward the refill request to us. Please allow 3 business days for your refill to be completed.          Additional Information About Your Visit        MyChart Information     Lab4U lets you send messages to your doctor, view your test results, renew your prescriptions, schedule appointments and more. To sign up, go to [email or web address] . Click on \"Log in\" on the left side of the screen, which will take you to the Welcome page. Then click on \"Sign up Now\" on the right side of the page.     You will be asked to enter the access code listed below, as well as some personal information. Please follow the directions to create your username and password.     Your access code is: [email or web address]  Expires: 2018  7:46 AM     Your access code will  in 90 days. If you need help or a new code, please call your [place] clinic or [phone].        Care EveryWhere ID     This " is your Care EveryWhere ID. This could be used by other organizations to access your [place] medical records  [id number]        Your Vitals Were     Pulse Temperature Pulse Oximetry             106 100.8  F (38.2  C) (Oral) 97%          Blood Pressure from Last 3 Encounters:   03/28/18 128/82   01/30/18 132/80   12/11/17 134/88    Weight from Last 3 Encounters:   06/22/16 124.3 kg (274 lb)   06/07/16 116.6 kg (257 lb)   08/21/14 105.2 kg (232 lb)              We Performed the Following     CL AFF HEMOGRAM/PLATE/DIFF (BFP)     HC XRAY CHEST, 2 VIEWS     Influenza A and B (BFP)     RAPID STREP (BFP)     THROAT CULTURE (BFP)     VENOUS COLLECTION        Primary Care Provider Office Phone # Fax #    Raffaele Cisneros [phone] [address] [facility] [address]  [facility] [address]        Equal Access to Services     [facility] : Hadii aad ku hadasho Soomaali, waaxda luqadaha, qaybta kaalmada adeegyada, waxay idiin hayaan shahram dubon . So [facility] [phone].    ATENCIÓN: Si habla español, tiene a reyes disposición servicios gratuitos de asistencia lingüística. Llame al [phone].    We comply with applicable federal civil rights laws and [place] laws. We do not discriminate on the basis of race, color, national origin, age, disability, sex, sexual orientation, or gender identity.            Thank you!     Thank you for choosing [facility]  for your care. Our goal is always to provide you with excellent care. Hearing back from our patients is one way we can continue to improve our services. Please take a few minutes to complete the written survey that you may receive in the mail after your visit with us. Thank you!             Your Updated Medication List - Protect others around you: Learn how to safely use, store and throw away your medicines at www.disposemymeds.org.          This list is accurate as of 3/28/18 11:59 PM.  Always use your most recent med  list.                   Brand Name Dispense Instructions for use Diagnosis    hydrocortisone 0.2 % cream    WESTCORT    45 g    Apply sparingly to affected area at bedtime    Eczema       REBIF SC           tiZANidine 4 MG tablet    ZANAFLEX    90 tablet    Take 1 tablet (4 mg) by mouth At Bedtime

## 2018-03-28 NOTE — PROGRESS NOTES
SUBJECTIVE:                                                    Ida Gayle is a 47 year old female who presents to clinic today for the following health issues:    This patient is accompanied in the office by her .     Started Okravse for MS.   1st  Infusion - 2/1/18  2nd  - 2/16/18    Has been feeling unwell in the last 4 days, fatigue, cough.   Sunday at [employer] and threw up 10 min later. Others ate same thing and weren't ill.   Monday developed cough, chest burning. Starting Mucinex DM  Last night developed Chills/fevers.       ROS:  C: fever yesterday, chills  Eyes: NEGATIVE for vision changes or irritation  Ears: NEGATIVE for pain, discharge, decreased hearing  Nose: NEGATIVE for rhinorrhea, epistaxis or congestion  Mouth: sore throat started yesterday  R: productive cough  CV: Chest feels tight and burning sensation  GI: denies vomiting, D/C  : NEGATIVE for frequency, dysuria, or hematuria    Is having a lot of fatigue as well which is known AE of the infusion        BP Readings from Last 3 Encounters:   03/28/18 128/82   01/30/18 132/80   12/11/17 134/88    Wt Readings from Last 3 Encounters:   06/22/16 124.3 kg (274 lb)   06/07/16 116.6 kg (257 lb)   08/21/14 105.2 kg (232 lb)            Patient Active Problem List   Diagnosis     Health Care Home     ACP (advance care planning)     Major depressive disorder, recurrent episode, in full remission (H)     MS (multiple sclerosis) (H)- Dr Bolanos     MCI (mild cognitive impairment)     Past Surgical History:   Procedure Laterality Date     C REIMPLANT URETER,DUPLICATED URETER  1974     CYSTECTOMY OVARIAN BENIGN  2010       Social History   Substance Use Topics     Smoking status: Never Smoker     Smokeless tobacco: Never Used     Alcohol use Yes     Family History   Problem Relation Age of Onset     DIABETES No family hx of      C.A.D. No family hx of      Prostate Cancer Father      Breast Cancer No family hx of      Cancer - colorectal No family  hx of      CANCER Maternal Grandmother      skin cancer         Current Outpatient Prescriptions   Medication Sig Dispense Refill     tiZANidine (ZANAFLEX) 4 MG tablet Take 1 tablet (4 mg) by mouth At Bedtime 90 tablet      Interferon Beta-1a (REBIF SC)        hydrocortisone (WESTCORT) 0.2 % cream Apply sparingly to affected area at bedtime 45 g 0       Allergies   Allergen Reactions     Flagyl [Metronidazole] Hives     Sulfa Drugs        OBJECTIVE:                                                    /82 (BP Location: Left arm, Patient Position: Chair, Cuff Size: Adult Large)  Pulse 106  Temp 100.8  F (38.2  C) (Oral)  SpO2 97% There is no height or weight on file to calculate BMI.     GENERAL: alert and no distress  HEAD: Normocephalic, atraumatic  EYES: Eyes grossly normal to inspection, extraocular movements - intact  EARS:   Right: External ear and canal normal, TM normal  Left: External ear and canal normal, TM normal  NOSE: No discharge noted  MOUTH/THROAT: no ulcers, no lesions, pharynx non-erythematous, no exudates present, tonsils without hypertrophy, mucous membranes moist.   NECK: no tenderness, no adenopathy, no asymmetry, no masses, no stiffness; thyroid- normal to palpation  RESP: lungs clear to auscultation - no crackles, no rhonchi, no wheezes  CV: regular rates and rhythm, normal S1 S2, no S3 or S4 and no murmur, no click or rub -    Labs Resulted Today:   Results for orders placed or performed in visit on 18   HC XRAY CHEST, 2 VIEWS    Narrative    [facility]  Phone: [phone] * Fax: [phone]  [address] [address]  [address] [place] [address]  Age: 47 Y Work Phone:  Dept No.: [phone]  LI BLAKE : 1970 Home Phone:  Chart #  Gender: F  Req. Phys: Karey King PA Clinic MRN:  Clinic: [facility] Acc#: [id number]  Exam: CHEST 2 VIEWS PA AND LATERAL Exam Date:  "03/28/2018  CHEST 2 VIEWS PA AND LATERAL 3/28/2018 1:55 PM  HISTORY: Cough.  COMPARISON: None.  FINDINGS: The heart is negative. The lungs are clear. The pulmonary vasculature is normal. The bones and soft tissues  are unremarkable.  IMPRESSION: No active infiltrates are identified.  Performed by: AGUS  Transcribed By: CARYL on: 03/28/2018 2:02 PM CDT  Finalized By: APOLONIA ABRAMS M.D. on: 03/28/2018 2:02 PM CDT  Dictated By: APOLONIA ABRAMS M.D. Signed by: [facility]  \"[facility]\" Page 1 of 1   CL AFF HEMOGRAM/PLATE/DIFF (BFP)   Result Value Ref Range    WBC 9.6 4.0 - 11 10*9/L    RBC Count 3.95 3.8 - 5.2 10*12/L    Hemoglobin 13.0 11.7 - 15.7 g/dL    Hematocrit 35.9 35.0 - 47.0 %    MCV 90.8 78 - 100 fL    MCH 32.9 26 - 33 pg    MCHC 36.2 (A) 31 - 36 g/dL    Platelet Count 329 150 - 375 10^9/L    % Granulocytes 69.5 %    % Lymphocytes 21.3 %    % Monocytes 9.2 %   Influenza A and B (BFP)   Result Value Ref Range    Influenza A neg neg    Influenza B neg neg   RAPID STREP (BFP)   Result Value Ref Range    Rapid Strep A Screen NEG neg              ASSESSMENT/PLAN:                                                        ICD-10-CM    1. Chills (without fever) R68.83 Influenza A and B (BFP)     CL AFF HEMOGRAM/PLATE/DIFF (BFP)     VENOUS COLLECTION   2. Cough R05 CL AFF HEMOGRAM/PLATE/DIFF (BFP)     VENOUS COLLECTION     HC XRAY CHEST, 2 VIEWS   3. Throat pain R07.0 RAPID STREP (BFP)     THROAT CULTURE (BFP)       Sx most likely viral.  Labs and xray reassuring.    Symptomatic cares advised including Increase fluids, rest, acetominophen or ibuprofen prn if not contraindicated.     The patient is to RTC prn if these symptoms worsen or fail to improve as anticipated.         Karey King PA-C  [facility]    "

## 2018-03-30 LAB — THROAT CULTURE: NORMAL

## 2018-04-02 ENCOUNTER — TELEPHONE (OUTPATIENT)
Dept: FAMILY MEDICINE | Facility: CLINIC | Age: 48
End: 2018-04-02

## 2018-04-02 DIAGNOSIS — J01.90 ACUTE SINUSITIS WITH SYMPTOMS > 10 DAYS: Primary | ICD-10-CM

## 2018-04-02 NOTE — TELEPHONE ENCOUNTER
Ida is out of town in [place].  She saw you last week and states she now has face pain, ears are plugged and with her MS has concerns that it will just keep getting worse.  She is wondering if you can call in a prescription for her?? - please advise    Patient's phone #[phone]

## 2018-04-03 NOTE — TELEPHONE ENCOUNTER
Spoke to patient and gave her message from Karey below. Patient expressed understanding and had no further questions or concerns. She stated she will set appointment up for recheck when she returns home from out of town.    Day Herrera CMA

## 2018-04-03 NOTE — TELEPHONE ENCOUNTER
Please call pt:    I sent in Augmentin  2 x a day for 10 days  Have her read pharmacy handout on Side effects  Have her see me when she returns for recheck    Karey King PA-C  4/3/2018

## 2018-05-10 ENCOUNTER — OFFICE VISIT (OUTPATIENT)
Dept: OBGYN | Facility: CLINIC | Age: 48
End: 2018-05-10
Payer: COMMERCIAL

## 2018-05-10 VITALS — DIASTOLIC BLOOD PRESSURE: 80 MMHG | SYSTOLIC BLOOD PRESSURE: 120 MMHG

## 2018-05-10 DIAGNOSIS — N89.8 ITCHING OF VAGINA: Primary | ICD-10-CM

## 2018-05-10 DIAGNOSIS — N76.0 BV (BACTERIAL VAGINOSIS): ICD-10-CM

## 2018-05-10 DIAGNOSIS — B96.89 BV (BACTERIAL VAGINOSIS): ICD-10-CM

## 2018-05-10 DIAGNOSIS — Z12.4 CERVICAL CANCER SCREENING: ICD-10-CM

## 2018-05-10 LAB
SPECIMEN SOURCE: ABNORMAL
WET PREP SPEC: ABNORMAL

## 2018-05-10 PROCEDURE — 87210 SMEAR WET MOUNT SALINE/INK: CPT | Performed by: OBSTETRICS & GYNECOLOGY

## 2018-05-10 PROCEDURE — G0145 SCR C/V CYTO,THINLAYER,RESCR: HCPCS | Performed by: OBSTETRICS & GYNECOLOGY

## 2018-05-10 PROCEDURE — 99203 OFFICE O/P NEW LOW 30 MIN: CPT | Performed by: OBSTETRICS & GYNECOLOGY

## 2018-05-10 PROCEDURE — 87624 HPV HI-RISK TYP POOLED RSLT: CPT | Performed by: OBSTETRICS & GYNECOLOGY

## 2018-05-10 NOTE — PROGRESS NOTES
SUBJECTIVE:                                                   Ida Gayle is a 47 year old female who presents to clinic today for the following health issue(s):  Patient presents with:  Vaginal Problem: recurrent yeast infx x 4 months, since starting new med (infusion) for MS, vaginal itching, irritation-usually only after intercourse, has tried monistat OTC  *Pt declined getting weight done today    HPI:  NP    Dx with MS 1.5yr ago. Follows with [facility].   Started a new med 3 mo  (q 6mo) and has been getting a lot of yeast infections. Has treated 3 times and then has sex and gets the symptoms. Her symptoms are feels irritated, burning, some itchiness. No discharge.     Has been on 20d of Whole 30 diet. Feels great. PLans to continue this.     Period regular, heavy, but short.     Review of PMH, SocHx, SurHx, FHx, medications completed. Epic updated.      Patient's last menstrual period was 04/15/2018 (approximate)..   Patient is sexually active, .  Using vasectomy for contraception.    reports that she has never smoked. She has never used smokeless tobacco.    STD testing offered?  Declined    Health maintenance updated:  yes    Today's PHQ-2 Score:   PHQ-2 (  [employer]) 2014   Q1: Little interest or pleasure in doing things 0   Q2: Feeling down, depressed or hopeless 0   PHQ-2 Score 0     Today's PHQ-9 Score:   PHQ-9 SCORE 3/3/2015   Total Score 5     Today's XANDER-7 Score: No flowsheet data found.    Problem list and histories reviewed & adjusted, as indicated.  Additional history: as documented.    Patient Active Problem List   Diagnosis     Health Care Home     ACP (advance care planning)     Major depressive disorder, recurrent episode, in full remission (H)     MS (multiple sclerosis) (H)- Dr Bolanos     MCI (mild cognitive impairment)     Past Surgical History:   Procedure Laterality Date     C REIMPLANT URETER,DUPLICATED URETER       CYSTECTOMY OVARIAN BENIGN         Social History   Substance Use Topics     Smoking status: Never Smoker     Smokeless tobacco: Never Used     Alcohol use Yes      Problem (# of Occurrences) Relation (Name,Age of Onset)    CANCER (1) Maternal Grandmother: skin cancer    Multiple Sclerosis (2) Brother, Cousin    Prostate Cancer (1) Father       Negative family history of: DIABETES, C.A.D., Breast Cancer, Cancer - colorectal            Current Outpatient Prescriptions   Medication Sig     hydrocortisone (WESTCORT) 0.2 % cream Apply sparingly to affected area at bedtime     Ocrelizumab (OCREVUS IV)      tiZANidine (ZANAFLEX) 4 MG tablet Take 1 tablet (4 mg) by mouth At Bedtime     No current facility-administered medications for this visit.      Allergies   Allergen Reactions     Flagyl [Metronidazole] Hives     Sulfa Drugs        ROS:  Constitutional: Fatigue  Genitourinary: Significant PMS, Urgency and Vaginal Itching  Neurologic: Headaches    OBJECTIVE:     /80  LMP 04/15/2018 (Approximate)  Breastfeeding? No  There is no height or weight on file to calculate BMI.    Exam:  Constitutional:  Appearance: Well nourished, well developed alert, in no acute distress  Chest:  Respiratory Effort:  Breathing unlabored  Lymphatic: Lymph Nodes:  No other lymphadenopathy present  Skin:General Inspection:  No rashes present, no lesions present, no areas of discoloration; Genitalia and Groin:  No rashes present, no lesions present, no areas of discoloration, no masses present.  Neurologic/Psychiatric:  Mental Status:  Oriented X3   Pelvic Exam:  External Genitalia:     Normal appearance for age, no discharge present, no tenderness present, no inflammatory lesions present, color normal  Vagina:     Normal vaginal vault without central or paravaginal defects, no discharge present, no inflammatory lesions present, no masses present  Bladder:     Nontender to palpation  Urethra:   Urethral Body:  Urethra palpation normal, urethra structural support  normal   Urethral Meatus:  No erythema or lesions present  Cervix:     Appearance healthy, no lesions present, nontender to palpation, no bleeding present  Perineum:     Perineum within normal limits, no evidence of trauma, no rashes or skin lesions present  Anus:     Anus within normal limits, no hemorrhoids present  Inguinal Lymph Nodes:     No lymphadenopathy present  Pubic Hair:     Normal pubic hair distribution for age  Genitalia and Groin:     No rashes present, no lesions present, no areas of discoloration, no masses present         ASSESSMENT/PLAN:                                                        ICD-10-CM    1. Itching of vagina L29.8 Wet  Prep   2. Cervical cancer screening Z12.4 Pap imaged thin layer screen with HPV - recommended age 30 - 65     HPV High Risk Types DNA Cervical         -Wet prep obtained.  -Likely pt is having combination of age related changes, vaginal irritation from excessive cleaning/topicals, or her MS meds. Reviewed perineal care, Dove soap, water flushing after intercourse. Discussed trying lubricant with intercourse as well and trying condoms if is wanting to eliminate semen irritation (doubt this).   F/U prn.   -pap obtained. Reviewed yearly exams. Will be due this year for mammogram as well. When returns, will need to get records from [facility].     Shari Nickerson Masters, DO  [facility] [place]

## 2018-05-10 NOTE — LETTER
May 16, 2018    Ida Gayle  [address] [facility] [address] [facility] [address]    Dear Ida,  We are happy to inform you that your PAP smear result from 5/10/18 is normal.  We are now able to do a follow up test on PAP smears. The DNA test is for HPV (Human Papilloma Virus). Cervical cancer is closely linked with certain types of HPV. Your results showed no evidence of high risk HPV.  Therefore we recommend you return in 3 years for your next pap smear.  You will still need to return to the clinic every year for an annual exam and other preventive tests.  Please contact the clinic at [phone] with any questions.  Sincerely,    Shari Nickerson Masters, DO/rlm

## 2018-05-10 NOTE — PROGRESS NOTES
Called pt with result. Sent topical clindamycin cream (generic) to her pharmacy for one week nightly use. Is allergic to Flagyl.   Pt without questions.    Shari Nickerson Masters, DO

## 2018-05-10 NOTE — MR AVS SNAPSHOT
"              After Visit Summary   5/10/2018    Ida Gayle    MRN: [id number]           Patient Information     Date Of Birth          1970        Visit Information        Provider Department      5/10/2018 1:30 PM Shari Ortiz,  [facility]        Today's Diagnoses     Itching of vagina    -  1    Cervical cancer screening           Follow-ups after your visit        Follow-up notes from your care team     Return if symptoms worsen or fail to improve.      Who to contact     If you have questions or need follow up information about today's clinic visit or your schedule please contact [facility] directly at [phone].  Normal or non-critical lab and imaging results will be communicated to you by MyChart, letter or phone within 4 business days after the clinic has received the results. If you do not hear from us within 7 days, please contact the clinic through MyChart or phone. If you have a critical or abnormal lab result, we will notify you by phone as soon as possible.  Submit refill requests through [employer] or call your pharmacy and they will forward the refill request to us. Please allow 3 business days for your refill to be completed.          Additional Information About Your Visit        MyChart Information     [employer] lets you send messages to your doctor, view your test results, renew your prescriptions, schedule appointments and more. To sign up, go to [email or web address] . Click on \"Log in\" on the left side of the screen, which will take you to the Welcome page. Then click on \"Sign up Now\" on the right side of the page.     You will be asked to enter the access code listed below, as well as some personal information. Please follow the directions to create your username and password.     Your access code is: [email or web address]  Expires: 2018  7:46 AM     Your access code will  in 90 days. If you need help or a new code, please " call your [facility] clinic or [phone].        Care EveryWhere ID     This is your Care EveryWhere ID. This could be used by other organizations to access your [facility] medical records  [id number]        Your Vitals Were     Last Period Breastfeeding?                04/15/2018 (Approximate) No           Blood Pressure from Last 3 Encounters:   05/10/18 120/80   03/28/18 128/82   01/30/18 132/80    Weight from Last 3 Encounters:   06/22/16 274 lb (124.3 kg)   06/07/16 257 lb (116.6 kg)   08/21/14 232 lb (105.2 kg)              We Performed the Following     HPV High Risk Types DNA Cervical     Pap imaged thin layer screen with HPV - recommended age 30 - 65     Wet  Prep        Primary Care Provider Office Phone # Fax #    Raffaele Cisneros [phone] [address] [address]        Equal Access to Services     DEMETRIS [facility] HospitalKAELA : Hadii aad ku hadasho Soomaali, waaxda luqadaha, qaybta kaalmada adeegyada, waxay maryin hayjosen shahram dubon . So [facility] [phone].    ATENCIÓN: Si habla español, tiene a reyes disposición servicios gratuitos de asistencia lingüística. Llame al [phone].    We comply with applicable federal civil rights laws and [place] laws. We do not discriminate on the basis of race, color, national origin, age, disability, sex, sexual orientation, or gender identity.            Thank you!     Thank you for choosing [facility]  for your care. Our goal is always to provide you with excellent care. Hearing back from our patients is one way we can continue to improve our services. Please take a few minutes to complete the written survey that you may receive in the mail after your visit with us. Thank you!             Your Updated Medication List - Protect others around you: Learn how to safely use, store and throw away your medicines at www.disposemymeds.org.          This list is accurate as of 5/10/18  2:08 PM.  Always  use your most recent med list.                   Brand Name Dispense Instructions for use Diagnosis    hydrocortisone 0.2 % cream    WESTCORT    45 g    Apply sparingly to affected area at bedtime    Eczema       OCREVUS IV           tiZANidine 4 MG tablet    ZANAFLEX    90 tablet    Take 1 tablet (4 mg) by mouth At Bedtime

## 2018-05-14 LAB
COPATH REPORT: NORMAL
PAP: NORMAL

## 2018-05-16 LAB
FINAL DIAGNOSIS: NORMAL
HPV HR 12 DNA CVX QL NAA+PROBE: NEGATIVE
HPV16 DNA SPEC QL NAA+PROBE: NEGATIVE
HPV18 DNA SPEC QL NAA+PROBE: NEGATIVE
SPECIMEN DESCRIPTION: NORMAL
SPECIMEN SOURCE CVX/VAG CYTO: NORMAL

## 2018-06-04 ENCOUNTER — TELEPHONE (OUTPATIENT)
Dept: NURSING | Facility: CLINIC | Age: 48
End: 2018-06-04

## 2018-06-04 NOTE — TELEPHONE ENCOUNTER
Pt returned call and informed her of Dr. Nino's recommendations to be seen in clinic. Verbalized understanding and transferred call to scheduling.  Alysia DILLARD RN

## 2018-06-04 NOTE — TELEPHONE ENCOUNTER
Pt had been dx with bacterial vaginitis and used clindamycin cream as directed. Irritation went away and a week later returned with itching, burning and irritation again. No discharge. But feels just like it did when in clinic 5/10/18.  Pt inquiring if she needs another dose due to her MS and the medication she is taking which lowers her immune system or what should she do?    Routing to Dr. Ortiz to advise and will return pt's call.    Alysia DILLARD RN    clindamycin phosphate, 1 Dose, 2 % CREA 1 Tube 0 5/10/2018 5/17/2018       Sig - Route: Place 1 Applicatorful vaginally At Bedtime for 7 doses - Vaginal      Class: E-Prescribe      Order: [id number]      E-Prescribing Status: Receipt confirmed by pharmacy (5/10/2018  5:38 PM CDT)

## 2018-06-05 ENCOUNTER — OFFICE VISIT (OUTPATIENT)
Dept: OBGYN | Facility: CLINIC | Age: 48
End: 2018-06-05
Payer: COMMERCIAL

## 2018-06-05 ENCOUNTER — TRANSFERRED RECORDS (OUTPATIENT)
Dept: HEALTH INFORMATION MANAGEMENT | Facility: CLINIC | Age: 48
End: 2018-06-05

## 2018-06-05 VITALS — HEIGHT: 69 IN | HEART RATE: 66 BPM | DIASTOLIC BLOOD PRESSURE: 70 MMHG | SYSTOLIC BLOOD PRESSURE: 112 MMHG

## 2018-06-05 DIAGNOSIS — N89.8 VAGINAL DISCHARGE: ICD-10-CM

## 2018-06-05 DIAGNOSIS — R82.90 NONSPECIFIC FINDING ON EXAMINATION OF URINE: ICD-10-CM

## 2018-06-05 DIAGNOSIS — N30.00 ACUTE CYSTITIS WITHOUT HEMATURIA: ICD-10-CM

## 2018-06-05 DIAGNOSIS — N89.8 VAGINAL IRRITATION: ICD-10-CM

## 2018-06-05 DIAGNOSIS — R10.2 PELVIC PRESSURE IN FEMALE: Primary | ICD-10-CM

## 2018-06-05 LAB
ALBUMIN UR-MCNC: NEGATIVE MG/DL
APPEARANCE UR: CLEAR
BACTERIA #/AREA URNS HPF: ABNORMAL /HPF
BILIRUB UR QL STRIP: NEGATIVE
COLOR UR AUTO: YELLOW
GLUCOSE UR STRIP-MCNC: NEGATIVE MG/DL
HGB UR QL STRIP: NEGATIVE
KETONES UR STRIP-MCNC: ABNORMAL MG/DL
LEUKOCYTE ESTERASE UR QL STRIP: ABNORMAL
NITRATE UR QL: NEGATIVE
NON-SQ EPI CELLS #/AREA URNS LPF: ABNORMAL /LPF
PH UR STRIP: 6 PH (ref 5–7)
RBC #/AREA URNS AUTO: ABNORMAL /HPF
SOURCE: ABNORMAL
SP GR UR STRIP: 1.02 (ref 1–1.03)
SPECIMEN SOURCE: ABNORMAL
UROBILINOGEN UR STRIP-ACNC: 0.2 EU/DL (ref 0.2–1)
WBC #/AREA URNS AUTO: ABNORMAL /HPF
WET PREP SPEC: ABNORMAL

## 2018-06-05 PROCEDURE — 87102 FUNGUS ISOLATION CULTURE: CPT | Performed by: NURSE PRACTITIONER

## 2018-06-05 PROCEDURE — 87088 URINE BACTERIA CULTURE: CPT | Performed by: NURSE PRACTITIONER

## 2018-06-05 PROCEDURE — 87186 SC STD MICRODIL/AGAR DIL: CPT | Performed by: NURSE PRACTITIONER

## 2018-06-05 PROCEDURE — 99213 OFFICE O/P EST LOW 20 MIN: CPT | Performed by: NURSE PRACTITIONER

## 2018-06-05 PROCEDURE — 87205 SMEAR GRAM STAIN: CPT | Performed by: NURSE PRACTITIONER

## 2018-06-05 PROCEDURE — 87210 SMEAR WET MOUNT SALINE/INK: CPT | Performed by: NURSE PRACTITIONER

## 2018-06-05 PROCEDURE — 87086 URINE CULTURE/COLONY COUNT: CPT | Performed by: NURSE PRACTITIONER

## 2018-06-05 PROCEDURE — 81001 URINALYSIS AUTO W/SCOPE: CPT | Performed by: NURSE PRACTITIONER

## 2018-06-05 PROCEDURE — 87653 STREP B DNA AMP PROBE: CPT | Performed by: NURSE PRACTITIONER

## 2018-06-05 RX ORDER — NYSTATIN AND TRIAMCINOLONE ACETONIDE 100000; 1 [USP'U]/G; MG/G
CREAM TOPICAL 2 TIMES DAILY
Qty: 30 G | Refills: 1 | Status: SHIPPED | OUTPATIENT
Start: 2018-06-05 | End: 2018-09-04

## 2018-06-05 NOTE — PROGRESS NOTES
SUBJECTIVE:                                                   Ida Gayle is a 48 year old female who presents to clinic today for the following health issue(s):  Patient presents with:  Vaginal Problem: patient was treated for BV 3 weeks ago and symptoms improved and then returned      Additional information: patient c/o of pelvic or bladder pressure    HPI: patient c/o vaginal itching internally and externally. Also having bladder pressure ecspecially with urinating.  She feels she got hives on chest from clindamycin. She is on a new IV medication for her MS and does feel that it is affecting her vaginal area with these reoccurring infections recently.      No LMP recorded..   Patient is not sexually active, .  Using none for contraception.    reports that she has never smoked. She has never used smokeless tobacco.    STD testing offered?  Declined - not sexually active    Health maintenance updated:  Due for Mammo, was being followed by [facility]    Today's PHQ-2 Score:   PHQ-2 (  [employer]) 2014   Q1: Little interest or pleasure in doing things 0   Q2: Feeling down, depressed or hopeless 0   PHQ-2 Score 0     Today's PHQ-9 Score:   PHQ-9 SCORE 3/3/2015   Total Score 5     Today's XANDER-7 Score: No flowsheet data found.    Problem list and histories reviewed & adjusted, as indicated.  Additional history: as documented.    Patient Active Problem List   Diagnosis     Health Care Home     ACP (advance care planning)     Major depressive disorder, recurrent episode, in full remission (H)     MS (multiple sclerosis) (H)- Dr Bolanos     MCI (mild cognitive impairment)     Past Surgical History:   Procedure Laterality Date     C REIMPLANT URETER,DUPLICATED URETER       CYSTECTOMY OVARIAN BENIGN        Social History   Substance Use Topics     Smoking status: Never Smoker     Smokeless tobacco: Never Used     Alcohol use Yes      Problem (# of Occurrences) Relation (Name,Age of Onset)  "   CANCER (1) Maternal Grandmother: skin cancer    Multiple Sclerosis (2) Brother, Cousin    Prostate Cancer (1) Father       Negative family history of: DIABETES, C.A.D., Breast Cancer, Cancer - colorectal            Current Outpatient Prescriptions   Medication Sig     hydrocortisone (WESTCORT) 0.2 % cream Apply sparingly to affected area at bedtime     nystatin-triamcinolone (MYCOLOG II) cream Apply topically 2 times daily Externally to affected area     Ocrelizumab (OCREVUS IV)      tiZANidine (ZANAFLEX) 4 MG tablet Take 1 tablet (4 mg) by mouth At Bedtime     No current facility-administered medications for this visit.      Allergies   Allergen Reactions     Flagyl [Metronidazole] Hives     Sulfa Drugs        ROS:  12 point review of systems negative other than symptoms noted below.  Genitourinary: Pelvic Pain, Vaginal Discharge and Vaginal Itching  Psychiatric: Anxiety    OBJECTIVE:     /70  Pulse 66  Ht 5' 8.5\" (1.74 m)  There is no height or weight on file to calculate BMI.  REFUSED WEIGHT    Exam:  Constitutional:  Appearance: Well nourished, well developed alert, in no acute distress   External genitalia very red and irriitated.  Vagina large amount of thick white discharge.  Cervix no discharge or tenderness noted.  Wet mount was done.  Vaginal culture obtained and sent.    In-Clinic Test Results:  Results for orders placed or performed in visit on 06/05/18 (from the past 24 hour(s))   UA with Microscopic   Result Value Ref Range    Color Urine Yellow     Appearance Urine Clear     Glucose Urine Negative NEG^Negative mg/dL    Bilirubin Urine Negative NEG^Negative    Ketones Urine 1+ (A) NEG^Negative mg/dL    Specific Gravity Urine 1.020 1.003 - 1.035    pH Urine 6.0 5.0 - 7.0 pH    Protein Albumin Urine Negative NEG^Negative mg/dL    Urobilinogen Urine 0.2 0.2 - 1.0 EU/dL    Nitrite Urine Negative NEG^Negative    Blood Urine Negative NEG^Negative    Leukocyte Esterase Urine 3+ (A) NEG^Negative    " Source Midstream Urine     WBC Urine 10-25 (A) OTO5^0 - 5 /HPF    RBC Urine O - 2 OTO2^O - 2 /HPF    Squamous Epithelial /LPF Urine Moderate (A) FEW^Few /LPF    Bacteria Urine Moderate (A) NEG^Negative /HPF   Wet prep   Result Value Ref Range    Specimen Description Vagina     Wet Prep No Trichomonas seen     Wet Prep No clue cells seen     Wet Prep Yeast seen (A)        ASSESSMENT/PLAN:                                                        ICD-10-CM    1. Pelvic pressure in female R10.2 UA with Microscopic     Urine Culture Aerobic Bacterial   2. Nonspecific finding on examination of urine R82.90 Urine Culture Aerobic Bacterial   3. Vaginal discharge N89.8 Wet prep     Yeast culture     Group B strep PCR     Gram stain   4. Vaginal irritation N89.8 nystatin-triamcinolone (MYCOLOG II) cream       There are no Patient Instructions on file for this visit.    Patient will use OC yeast cream, hives from diflucan.  Will notify patient with lab results when available.    Mycolog cream for external use.  Return prn.    YANA Hoang [facility]

## 2018-06-05 NOTE — MR AVS SNAPSHOT
"              After Visit Summary   6/5/2018    Ida Gayle    MRN: [id number]           Patient Information     Date Of Birth          1970        Visit Information        Provider Department      6/5/2018 11:30 AM Margie Boucher APRN CNP [facility]        Today's Diagnoses     Pelvic pressure in female    -  1    Nonspecific finding on examination of urine        Vaginal discharge        Vaginal irritation           Follow-ups after your visit        Follow-up notes from your care team     Return if symptoms worsen or fail to improve.      Who to contact     If you have questions or need follow up information about today's clinic visit or your schedule please contact [facility] directly at [phone].  Normal or non-critical lab and imaging results will be communicated to you by MyChart, letter or phone within 4 business days after the clinic has received the results. If you do not hear from us within 7 days, please contact the clinic through MyChart or phone. If you have a critical or abnormal lab result, we will notify you by phone as soon as possible.  Submit refill requests through [employer] or call your pharmacy and they will forward the refill request to us. Please allow 3 business days for your refill to be completed.          Additional Information About Your Visit        Care EveryWhere ID     This is your Care EveryWhere ID. This could be used by other organizations to access your [place] medical records  [id number]        Your Vitals Were     Pulse Height                66 5' 8.5\" (1.74 m)           Blood Pressure from Last 3 Encounters:   06/05/18 112/70   05/10/18 120/80   03/28/18 128/82    Weight from Last 3 Encounters:   06/22/16 274 lb (124.3 kg)   06/07/16 257 lb (116.6 kg)   08/21/14 232 lb (105.2 kg)              We Performed the Following     Gram stain     Group B strep PCR     UA with Microscopic     Urine Culture Aerobic " Bacterial     Wet prep     Yeast culture          Today's Medication Changes          These changes are accurate as of 6/5/18  1:26 PM.  If you have any questions, ask your nurse or doctor.               Start taking these medicines.        Dose/Directions    nystatin-triamcinolone cream   Commonly known as:  MYCOLOG II   Used for:  Vaginal irritation   Started by:  Margie Boucher APRN CNP        Apply topically 2 times daily Externally to affected area   Quantity:  30 g   Refills:  1            Where to get your medicines      These medications were sent to [id number] - [facility] [address] AT [place] [address] [address]  [address], [facility] [address]     Phone:  [phone]     nystatin-triamcinolone cream                Primary Care Provider Office Phone # Fax #    Raffaele Cisneros -[phone] [address]  [facility] [address]        Equal Access to Services     DEMETRIS LUCAS AH: Hadii nelsy ku hadasho Soomaali, waaxda luqadaha, qaybta kaalmada adeegyada, waxay idiin hayaan shahram dubon . So [facility] [phone].    ATENCIÓN: Si shyam rodriguez, tiene a reyes disposición servicios gratuitos de asistencia lingüística. [facility] [phone].    We comply with applicable federal civil rights laws and [place] laws. We do not discriminate on the basis of race, color, national origin, age, disability, sex, sexual orientation, or gender identity.            Thank you!     Thank you for choosing [facility] [place]  for your care. Our goal is always to provide you with excellent care. Hearing back from our patients is one way we can continue to improve our services. Please take a few minutes to complete the written survey that you may receive in the mail after your visit with us. Thank you!             Your Updated Medication List - Protect others around you: Learn how to safely use, store and throw away  your medicines at www.disposemymeds.org.          This list is accurate as of 6/5/18  1:26 PM.  Always use your most recent med list.                   Brand Name Dispense Instructions for use Diagnosis    hydrocortisone 0.2 % cream    WESTCORT    45 g    Apply sparingly to affected area at bedtime    Eczema       nystatin-triamcinolone cream    MYCOLOG II    30 g    Apply topically 2 times daily Externally to affected area    Vaginal irritation       OCREVUS IV           tiZANidine 4 MG tablet    ZANAFLEX    90 tablet    Take 1 tablet (4 mg) by mouth At Bedtime

## 2018-06-06 LAB
GRAM STN SPEC: ABNORMAL
SPECIMEN SOURCE: ABNORMAL

## 2018-06-07 ENCOUNTER — TELEPHONE (OUTPATIENT)
Dept: NURSING | Facility: CLINIC | Age: 48
End: 2018-06-07

## 2018-06-07 LAB
GP B STREP DNA SPEC QL NAA+PROBE: NEGATIVE
SPECIMEN SOURCE: NORMAL

## 2018-06-07 NOTE — TELEPHONE ENCOUNTER
Pt calling in,  Waiting for lab results on urine culture from 6/5/18. They are still pending for sensitivity, pt remains quite uncomfortable.   Reviewed that while waiting for possible abx pt could try AZO/pyridium and scheduled tylenol. Should continue hydration. Would like call ASAP when culture results are back. Her cell is: [phone] cell, can detailed VM-

## 2018-06-07 NOTE — TELEPHONE ENCOUNTER
"Pt calling inquiring about the results from her UA/UC and wet prep. Confirmed that she does have a UTI and that the culture is still waiting to see what ABX would be the best to tx the type of bacteria. Yes, yeast was present in wet prep too. She can continue to the OTC monistat she is using. She said she is still raw, red with itching and burning. Using the cream in a thin layer 2x/day but inquired if she could use it 3x/day as it tends to \"wear off\" and using it for comfort too. Encouraged warm tub baths for comfort.  Pt verbalized understanding and will wait a call re: future tx when UC complete.  Will route to Ling antoine that pt waiting for results.  Alysia DILLARD RN    "

## 2018-06-08 LAB
BACTERIA SPEC CULT: ABNORMAL
Lab: ABNORMAL
SPECIMEN SOURCE: ABNORMAL
SPECIMEN SOURCE: ABNORMAL
YEAST SPEC QL CULT: ABNORMAL

## 2018-06-08 RX ORDER — NITROFURANTOIN 25; 75 MG/1; MG/1
100 CAPSULE ORAL 2 TIMES DAILY
Qty: 14 CAPSULE | Refills: 0 | Status: SHIPPED | OUTPATIENT
Start: 2018-06-08 | End: 2018-06-27

## 2018-06-08 NOTE — TELEPHONE ENCOUNTER
Citlaly pt.     Routing pt's just-resulted urine culture results from 6/5/18 to NP Jetapolly to advise for abx treatment. Pt has e coli, sensitive to all abx. Thank you!

## 2018-06-10 ENCOUNTER — HEALTH MAINTENANCE LETTER (OUTPATIENT)
Age: 48
End: 2018-06-10

## 2018-06-15 NOTE — PROGRESS NOTES
SUBJECTIVE:                                                    Ida Gayle is a 47 year old female who presents to clinic today for the following health issues:    Ida is here with URI sx. That started about 7 days ago.  She is having facial pain, sinus congestion, rhinorrhea.  Pt has MS and is on interferon    OTC: Benadryl              ROS:  C: NEGATIVE for fever, chills, change in weight  R: NEGATIVE for significant cough or SOB  CV: NEGATIVE for chest pain, palpitations or peripheral edema  GI: NEGATIVE for nausea, abdominal pain, heartburn, or change in bowel habits  : NEGATIVE for frequency, dysuria, or hematuria        BP Readings from Last 3 Encounters:   11/21/17 128/80   04/15/17 120/80   06/22/16 110/70    Wt Readings from Last 3 Encounters:   06/22/16 124.3 kg (274 lb)   06/07/16 116.6 kg (257 lb)   08/21/14 105.2 kg (232 lb)            Patient Active Problem List   Diagnosis     Health Care Home     ACP (advance care planning)     Major depressive disorder, recurrent episode, in full remission (H)     MS (multiple sclerosis) (H)- Dr Bolanos     MCI (mild cognitive impairment)     Past Surgical History:   Procedure Laterality Date     C REIMPLANT URETER,DUPLICATED URETER  1974     CYSTECTOMY OVARIAN BENIGN  2010       Social History   Substance Use Topics     Smoking status: Never Smoker     Smokeless tobacco: Never Used     Alcohol use Yes     Family History   Problem Relation Age of Onset     DIABETES No family hx of      C.A.D. No family hx of      Prostate Cancer Father      Breast Cancer No family hx of      Cancer - colorectal No family hx of      CANCER Maternal Grandmother      skin cancer         Current Outpatient Prescriptions   Medication Sig Dispense Refill     tiZANidine (ZANAFLEX) 4 MG tablet Take 1 tablet (4 mg) by mouth At Bedtime 90 tablet      amoxicillin (AMOXIL) 875 MG tablet Take 1 tablet (875 mg) by mouth 2 times daily 20 tablet 0     Interferon Beta-1a (REBIF SC)         hydrocortisone (WESTCORT) 0.2 % cream Apply sparingly to affected area at bedtime 45 g 0       Allergies   Allergen Reactions     Flagyl [Metronidazole] Hives     Sulfa Drugs        OBJECTIVE:                                                    /80 (BP Location: Right arm, Patient Position: Chair, Cuff Size: Adult Large)  Pulse 88  Temp 98.8  F (37.1  C) (Oral)  Resp 20  LMP 11/14/2017  Breastfeeding? No There is no height or weight on file to calculate BMI.     GENERAL: alert and no distress  HEAD: Normocephalic, atraumatic  EYES: Eyes grossly normal to inspection, extraocular movements - intact  EARS:   Right: External ear and canal normal, TM normal  Left: External ear and canal normal, TM normal  NOSE: No discharge noted  MOUTH/THROAT: no ulcers, no lesions, pharynx non-erythematous, no exudates present, tonsils without hypertrophy, mucous membranes moist.   NECK: no tenderness, no adenopathy, no asymmetry, no masses, no stiffness; thyroid- normal to palpation  RESP: lungs clear to auscultation - no crackles, no rhonchi, no wheezes  CV: regular rates and rhythm, normal S1 S2, no S3 or S4 and no murmur, no click or rub -         ASSESSMENT/PLAN:                                                        ICD-10-CM    1. Acute non-recurrent maxillary sinusitis J01.00 amoxicillin (AMOXIL) 875 MG tablet   2. Yeast infection of the vagina B37.3 fluconazole (DIFLUCAN) 150 MG tablet     Amoxicillin 875 mg bid x 7 days.  Advised AE  Encouraged increase yogurt intake or probiotic.    RTC if not improving with tx  Diflucan if she develops yeast inf. From kristopher King PA-C  [facility]     detailed exam

## 2018-06-27 ENCOUNTER — OFFICE VISIT (OUTPATIENT)
Dept: FAMILY MEDICINE | Facility: CLINIC | Age: 48
End: 2018-06-27

## 2018-06-27 VITALS
DIASTOLIC BLOOD PRESSURE: 70 MMHG | SYSTOLIC BLOOD PRESSURE: 124 MMHG | HEART RATE: 88 BPM | TEMPERATURE: 98.9 F | RESPIRATION RATE: 20 BRPM

## 2018-06-27 DIAGNOSIS — D84.9 IMMUNOSUPPRESSION (H): ICD-10-CM

## 2018-06-27 DIAGNOSIS — G35 MS (MULTIPLE SCLEROSIS) (H): ICD-10-CM

## 2018-06-27 DIAGNOSIS — J01.01 ACUTE RECURRENT MAXILLARY SINUSITIS: Primary | ICD-10-CM

## 2018-06-27 PROCEDURE — 99213 OFFICE O/P EST LOW 20 MIN: CPT | Performed by: PHYSICIAN ASSISTANT

## 2018-06-27 NOTE — PROGRESS NOTES
"CC: Sore throat, cough    History:  Exactly 1 week ago, was put on IV steroids for MS flare for 3 days, and did oral taper that started last Friday, and the last pill is today.    Starting getting throat irration/\"tickle cough\"4-5 days ago. Took Mucinex DM, and has been spitting up green phlegm. Right ear started hurting this morning. No sweats, chills, but did have fever of 100 at time of infusion. Took Tylenol sinus severe this morning.     started on antibiotic for pneumonia 5-6 days ago.     PMH, MEDICATIONS, ALLERGIES, SOCIAL AND FAMILY HISTORY in [facility] and reviewed by me personally.      ROS negative other than the symptoms noted above in the HPI.        Examination   /70 (BP Location: Right arm, Patient Position: Chair, Cuff Size: Adult Large)  Pulse 88  Temp 98.9  F (37.2  C) (Oral)  Resp 20  LMP 06/17/2018  Breastfeeding? No       Constitutional: Sitting comfortably, in no acute distress. Vital signs noted  Eyes: pupils equal round reactive to light and accomodation, extra ocular movements intact  Ears: external canals and TMs free of abnormalities  Nose: patent, without mucosal abnormalities  Mouth and throat: without erythema or lesions of the mucosa  Neck:  no adenopathy, trachea midline and normal to palpation  Cardiovascular:  regular rate and rhythm, no murmurs, clicks, or gallops  Respiratory:  normal respiratory rate and rhythm, lungs clear to auscultation  SKIN: No jaundice/pallor/rash.   Psychiatric: mentation appears normal and affect normal/bright        A/P    ICD-10-CM    1. Acute recurrent maxillary sinusitis J01.01 amoxicillin-clavulanate (AUGMENTIN) 875-125 MG per tablet   2. MS (multiple sclerosis) (H)- Dr Bolanos G35    3. Immunosuppression (H) D89.9        DISCUSSION:  Given severity of symptoms, and immunocompromised status, recommended Ida complete 10 day course of Augmentin. She should take this twice daily with food, and should consider taking probiotic or " eating yogurt in between. Warned her of possible side effects including loose stool. Provided her with recommendations on OTC therapies based on symptoms. She should contact me in 1 week if symptoms are not improving, or sooner with any intolerable side effects or worsening symptoms.    follow up visit: As needed    Dulce Fofana PA-C  [facility]

## 2018-06-27 NOTE — NURSING NOTE
Ida Gayle is here for a sore throat and cough. Had IV steroids last week and  had pneumonia.    Questioned patient about current smoking habits.  Pt. has never smoked.  PULSE regular  My Chart: declines  CLASSIFICATION OF OVERWEIGHT AND OBESITY BY BMI                        Obesity Class           BMI(kg/m2)  Underweight                                    < 18.5  Normal                                         18.5-24.9  Overweight                                     25.0-29.9  OBESITY                     I                  30.0-34.9                             II                 35.0-39.9  EXTREME OBESITY             III                >40                            Patient's  BMI There is no height or weight on file to calculate BMI.  http://hin.nhlbi.nih.gov/menuplanner/menu.cgi  Pre-visit planning  Immunizations - needs Td, not feeling well today  Colonoscopy -   Mammogram -   Asthma -   PHQ9 -    XANDER-7 -

## 2018-06-27 NOTE — MR AVS SNAPSHOT
After Visit Summary   6/27/2018    Ida Gayle    MRN: [id number]           Patient Information     Date Of Birth          1970        Visit Information        Provider Department      6/27/2018 9:00 AM Dulce Fofana PA-C [facility]        Today's Diagnoses     Acute recurrent maxillary sinusitis    -  1       Follow-ups after your visit        Follow-up notes from your care team     Return if symptoms worsen or fail to improve.      Your next 10 appointments already scheduled     Jul 11, 2018  1:40 PM CDT   New Visit with Ida Matson PA-C   [facility]    [address] [address] [phone]              Who to contact     If you have questions or need follow up information about today's clinic visit or your schedule please contact [facility] directly at [phone].  Normal or non-critical lab and imaging results will be communicated to you by MyChart, letter or phone within 4 business days after the clinic has received the results. If you do not hear from us within 7 days, please contact the clinic through MyChart or phone. If you have a critical or abnormal lab result, we will notify you by phone as soon as possible.  Submit refill requests through [employer] or call your pharmacy and they will forward the refill request to us. Please allow 3 business days for your refill to be completed.          Additional Information About Your Visit        Care EveryWhere ID     This is your Care EveryWhere ID. This could be used by other organizations to access your [place] medical records  [id number]        Your Vitals Were     Pulse Temperature Respirations Last Period Breastfeeding?       88 98.9  F (37.2  C) (Oral) 20 06/17/2018 No        Blood Pressure from Last 3 Encounters:   06/27/18 124/70   06/05/18 112/70   05/10/18 120/80    Weight  from Last 3 Encounters:   06/22/16 124.3 kg (274 lb)   06/07/16 116.6 kg (257 lb)   08/21/14 105.2 kg (232 lb)              Today, you had the following     No orders found for display         Today's Medication Changes          These changes are accurate as of 6/27/18  2:48 PM.  If you have any questions, ask your nurse or doctor.               Start taking these medicines.        Dose/Directions    amoxicillin-clavulanate 875-125 MG per tablet   Commonly known as:  AUGMENTIN   Used for:  Acute recurrent maxillary sinusitis   Started by:  Dulce Fofana PA-C        Dose:  1 tablet   Take 1 tablet by mouth 2 times daily for 10 days   Quantity:  20 tablet   Refills:  0            Where to get your medicines      These medications were sent to [facility] [address] [place] [address] [address] AT [place] [address] & [address]  [address], [facility] [address]     Phone:  [phone]     amoxicillin-clavulanate 875-125 MG per tablet                Primary Care Provider Office Phone # Fax #    Raffaele Cisneros [phone] [address] [facility] [address]  [facility] [address]        Equal Access to Services     PRAFUL LUCAS AH: Hadii nelsy ku hadasho Soomaali, waaxda luqadaha, qaybta kaalmada adeegyada, waxay idiin hayjosen shahram bermudez. So [facility] [phone].    ATENCIÓN: Si habla español, tiene a reyes disposición servicios gratuitos de asistencia lingüística. Llame al [phone].    We comply with applicable federal civil rights laws and [place] laws. We do not discriminate on the basis of race, color, national origin, age, disability, sex, sexual orientation, or gender identity.            Thank you!     Thank you for choosing [facility]  for your care. Our goal is always to provide you with excellent care. Hearing back from our patients is one way we can continue to improve our services. Please take a few minutes to complete  the written survey that you may receive in the mail after your visit with us. Thank you!             Your Updated Medication List - Protect others around you: Learn how to safely use, store and throw away your medicines at www.disposemRealRidereds.org.          This list is accurate as of 6/27/18  2:48 PM.  Always use your most recent med list.                   Brand Name Dispense Instructions for use Diagnosis    amoxicillin-clavulanate 875-125 MG per tablet    AUGMENTIN    20 tablet    Take 1 tablet by mouth 2 times daily for 10 days    Acute recurrent maxillary sinusitis       hydrocortisone 0.2 % cream    WESTCORT    45 g    Apply sparingly to affected area at bedtime    Eczema       nystatin-triamcinolone cream    MYCOLOG II    30 g    Apply topically 2 times daily Externally to affected area    Vaginal irritation       OCREVUS IV           tiZANidine 4 MG tablet    ZANAFLEX    90 tablet    Take 1 tablet (4 mg) by mouth At Bedtime

## 2018-07-09 ENCOUNTER — OFFICE VISIT (OUTPATIENT)
Dept: FAMILY MEDICINE | Facility: CLINIC | Age: 48
End: 2018-07-09

## 2018-07-09 VITALS — HEART RATE: 84 BPM | SYSTOLIC BLOOD PRESSURE: 136 MMHG | DIASTOLIC BLOOD PRESSURE: 78 MMHG | TEMPERATURE: 99 F

## 2018-07-09 DIAGNOSIS — J32.4 CHRONIC PANSINUSITIS: Primary | ICD-10-CM

## 2018-07-09 PROCEDURE — 99213 OFFICE O/P EST LOW 20 MIN: CPT | Performed by: PHYSICIAN ASSISTANT

## 2018-07-09 NOTE — MR AVS SNAPSHOT
After Visit Summary   7/9/2018    Ida Gayle    MRN: [id number]           Patient Information     Date Of Birth          1970        Visit Information        Provider Department      7/9/2018 1:45 PM Fofana, Dulce Estephania, PA-C [facility]        Today's Diagnoses     Chronic pansinusitis    -  1       Follow-ups after your visit        Additional Services     OTOLARYNGOLOGY REFERRAL       Your provider has referred you to: N: [facility] [place] [phone]   [email or web address]    Please fax. Pt will call to schedule.    Please be aware that coverage of these services is subject to the terms and limitations of your health insurance plan.  Call member services at your health plan with any benefit or coverage questions.      Please bring the following with you to your appointment:    (1) Any X-Rays, CTs or MRIs which have been performed.  Contact the facility where they were done to arrange for  prior to your scheduled appointment.   (2) List of current medications  (3) This referral request   (4) Any documents/labs given to you for this referral                  Follow-up notes from your care team     Return if symptoms worsen or fail to improve.      Your next 10 appointments already scheduled     Jul 11, 2018  1:40 PM CDT   New Visit with [facility]da [facility]son [facility]-C   [facility]    [address] [address] [phone]              Who to contact     If you have questions or need follow up information about today's clinic visit or your schedule please contact [facility] directly at [phone].  Normal or non-critical lab and imaging results will be communicated to you by MyChart, letter or phone within 4 business days after the clinic has received the results. If you do not hear from us within 7 days,  please contact the clinic through [employer] or phone. If you have a critical or abnormal lab result, we will notify you by phone as soon as possible.  Submit refill requests through [employer] or call your pharmacy and they will forward the refill request to us. Please allow 3 business days for your refill to be completed.          Additional Information About Your Visit        Care EveryWhere ID     This is your Care EveryWhere ID. This could be used by other organizations to access your [place] medical records  [id number]        Your Vitals Were     Pulse Temperature Last Period             84 99  F (37.2  C) (Oral) 06/17/2018          Blood Pressure from Last 3 Encounters:   07/09/18 136/78   06/27/18 124/70   06/05/18 112/70    Weight from Last 3 Encounters:   06/22/16 124.3 kg (274 lb)   06/07/16 116.6 kg (257 lb)   08/21/14 105.2 kg (232 lb)              We Performed the Following     OTOLARYNGOLOGY REFERRAL          Today's Medication Changes          These changes are accurate as of 7/9/18  6:53 PM.  If you have any questions, ask your nurse or doctor.               Start taking these medicines.        Dose/Directions    amoxicillin-clavulanate 875-125 MG per tablet   Commonly known as:  AUGMENTIN   Used for:  Chronic pansinusitis   Started by:  Dulce Fofana PA-C        Dose:  1 tablet   Take 1 tablet by mouth 2 times daily for 10 days   Quantity:  20 tablet   Refills:  0            Where to get your medicines      These medications were sent to [facility] [address] [place] [address] [address] AT [place] [address] [address]  [address], [facility] [address]     Phone:  [phone]     amoxicillin-clavulanate 875-125 MG per tablet                Primary Care Provider Office Phone # Fax #    Raffaele Cisneros [phone] [address] [facility] [address]  [facility] [address]        Equal Access to Services     PRAFUL LUCAS AH: Odalys everett  [place], watigistda luqadaha, qaybta kaneelam garcia, jose omalley anyneptali lamtorsten aidan. So [facility] [phone].    ATENCIÓN: Si shyam rodriguez, tiene a reyes disposición servicios gratuitos de asistencia lingüística. Delroy al [phone].    We comply with applicable federal civil rights laws and [place] laws. We do not discriminate on the basis of race, color, national origin, age, disability, sex, sexual orientation, or gender identity.            Thank you!     Thank you for choosing [facility]  for your care. Our goal is always to provide you with excellent care. Hearing back from our patients is one way we can continue to improve our services. Please take a few minutes to complete the written survey that you may receive in the mail after your visit with us. Thank you!             Your Updated Medication List - Protect others around you: Learn how to safely use, store and throw away your medicines at www.disposemymeds.org.          This list is accurate as of 7/9/18  6:53 PM.  Always use your most recent med list.                   Brand Name Dispense Instructions for use Diagnosis    amoxicillin-clavulanate 875-125 MG per tablet    AUGMENTIN    20 tablet    Take 1 tablet by mouth 2 times daily for 10 days    Chronic pansinusitis       hydrocortisone 0.2 % cream    WESTCORT    45 g    Apply sparingly to affected area at bedtime    Eczema       nystatin-triamcinolone cream    MYCOLOG II    30 g    Apply topically 2 times daily Externally to affected area    Vaginal irritation       OCREVUS IV           tiZANidine 4 MG tablet    ZANAFLEX    90 tablet    Take 1 tablet (4 mg) by mouth At Bedtime

## 2018-07-09 NOTE — PROGRESS NOTES
CC: Persistent Symptoms    History:  Was last 6/27, with sinus infection that had resolved and then returned since a sinus infection 3/2018. Was started on Augmentin at that appt, and finished this 2 days ago on Saturday. Once stopped, nasal drainage returned, headache, ear pressure. Still feeling lethargic.     Used neti pot twice daily. Does saline rinse.     Took an Aleve this AM.     Next infusion of Ocrevus for MS is Aug 16. She does these twice per year.    PMH, MEDICATIONS, ALLERGIES, SOCIAL AND FAMILY HISTORY in [facility] and reviewed by me personally.      ROS negative other than the symptoms noted above in the HPI.        Examination   /78 (BP Location: Right arm, Patient Position: Chair, Cuff Size: Adult Large)  Pulse 84  Temp 99  F (37.2  C) (Oral)  LMP 06/17/2018       Constitutional: Sitting comfortably, in no acute distress. Vital signs noted  Eyes: pupils equal round reactive to light and accomodation, extra ocular movements intact  Ears: external canals and TMs free of abnormalities  Nose: patent, without mucosal abnormalities. Moderate congestion  Mouth and throat: without erythema or lesions of the mucosa  Neck:  no adenopathy, trachea midline and normal to palpation  Cardiovascular:  regular rate and rhythm, no murmurs, clicks, or gallops  Respiratory:  normal respiratory rate and rhythm, lungs clear to auscultation  SKIN: No jaundice/pallor/rash.   Psychiatric: mentation appears normal and affect normal/bright        A/P    ICD-10-CM    1. Chronic pansinusitis J32.4 OTOLARYNGOLOGY REFERRAL     amoxicillin-clavulanate (AUGMENTIN) 875-125 MG per tablet       DISCUSSION: Suspect chronic rhinitis contributing to persistent sinusitis. Recommended another course of Augmentin, along with trial of fluticasone indefinitely. Continue with daytime antihistamine. Strongly encouraged her to schedule with ENT in case this not resolve to ensure she is optimizing treatment plan. I will write referral for  this in case needed.     follow up visit: As needed    Dulce Fofana PA-C  [facility]

## 2018-07-09 NOTE — NURSING NOTE
Ida Gayle is here for a recheck of her ear and throat. Still has ear pain and also phlegm.      Questioned patient about current smoking habits.  Pt. has never smoked.  PULSE regular  My Chart: declines  CLASSIFICATION OF OVERWEIGHT AND OBESITY BY BMI                        Obesity Class           BMI(kg/m2)  Underweight                                    < 18.5  Normal                                         18.5-24.9  Overweight                                     25.0-29.9  OBESITY                     I                  30.0-34.9                             II                 35.0-39.9  EXTREME OBESITY             III                >40                            Patient's  BMI There is no height or weight on file to calculate BMI.  http://hin.nhlbi.nih.gov/menuplanner/menu.cgi  Pre-visit planning  Immunizations - up to date  Colonoscopy -   Mammogram -   Asthma -   PHQ9 -    XANDER-7 -

## 2018-09-04 ENCOUNTER — OFFICE VISIT (OUTPATIENT)
Dept: FAMILY MEDICINE | Facility: CLINIC | Age: 48
End: 2018-09-04

## 2018-09-04 VITALS
HEART RATE: 83 BPM | DIASTOLIC BLOOD PRESSURE: 70 MMHG | TEMPERATURE: 98.8 F | OXYGEN SATURATION: 98 % | SYSTOLIC BLOOD PRESSURE: 124 MMHG

## 2018-09-04 DIAGNOSIS — G35 MS (MULTIPLE SCLEROSIS) (H): ICD-10-CM

## 2018-09-04 DIAGNOSIS — D84.9 IMMUNOSUPPRESSION (H): ICD-10-CM

## 2018-09-04 DIAGNOSIS — J01.01 ACUTE RECURRENT MAXILLARY SINUSITIS: Primary | ICD-10-CM

## 2018-09-04 PROCEDURE — 99213 OFFICE O/P EST LOW 20 MIN: CPT | Performed by: PHYSICIAN ASSISTANT

## 2018-09-04 NOTE — PROGRESS NOTES
CC: Right ear pain    History:  Ida is here today as she was having right ear pain starting 1 week ago. Had buzzing/ringing in ear at first. Generally buzzing is better, but can still get it when she lays down. Does get pain with swallowing in ear, and some crackling/bubbling sounds in ear. Also having some right sided throat pain as well. Has headaches a lot, but has had a headache over forehead for about 1 week, which is unusual for her. Has been doing Netipot, but no nasal sprays, allergy pills.     Ida recently started on ocrelizumab for MS that she does every 6 months, starting 6 months ago, and just had her 2nd dose 2 weeks ago on August 17.       PMH, MEDICATIONS, ALLERGIES, SOCIAL AND FAMILY HISTORY in [facility] and reviewed by me personally.      ROS negative other than the symptoms noted above in the HPI.        Examination   /70 (BP Location: Left arm, Patient Position: Sitting, Cuff Size: Adult Large)  Pulse 83  Temp 98.8  F (37.1  C) (Oral)  LMP 08/31/2018  SpO2 98%       Constitutional: Sitting comfortably, in no acute distress. Vital signs noted  Eyes: pupils equal round reactive to light and accomodation, extra ocular movements intact  Ears: external canals and TMs free of abnormalities  Nose: patent, without mucosal abnormalities  Mouth and throat: without erythema or lesions of the mucosa  Neck:  no adenopathy, trachea midline and normal to palpation  Cardiovascular:  regular rate and rhythm, no murmurs, clicks, or gallops  Respiratory:  normal respiratory rate and rhythm, lungs clear to auscultation  SKIN: No jaundice/pallor/rash.   Psychiatric: mentation appears normal and affect normal/bright        A/P    ICD-10-CM    1. Acute recurrent maxillary sinusitis J01.01 amoxicillin-clavulanate (AUGMENTIN) 875-125 MG per tablet   2. Immunosuppression (H) D89.9    3. MS (multiple sclerosis) (H)- Dr Bolanos G35        DISCUSSION:  Consistent with sinusitis, could still be viral or  eustachian tube dysfunction.  Recommend phenylephrine (OTC) or Advil Cold & Sinus during day as decongestant. Also use Flonase daily AM or PM. Continue taking ibuprofen alternating with tylenol every 2-3 hours for pain and inflammation.     If not improving, okay to start another course of Augmentin, but should see ENT at that time given frequent need for abx starting before immunosuppression for MS.    follow up visit: As needed    [facility]

## 2018-09-04 NOTE — PATIENT INSTRUCTIONS
Consistent with sinusitis, could still be viral or eustachian tube dysfunction.  Recommend phenylephrine (OTC) or Advil Cold & Sinus during day as decongestant. Also use Flonase daily AM or PM. Continue taking ibuprofen alternating with tylenol every 2-3 hours for pain and inflammation.     If not improving, okay to start another course of Augmentin, but really should consider seeing ENT.

## 2018-09-04 NOTE — NURSING NOTE
Ida is here for R ear pain for one week        Pre-visit Screening:  Immunizations:  not up to date - needs TD  Colonoscopy:  NA  Mammogram: is due and to be scheduled by patient for later completion  Asthma Action Test/Plan:  NA  PHQ9:  none  GAD7:  none  Questioned patient about current smoking habits Pt. has never smoked.  Ok to leave detailed message on voice mail for today's visit only Yes, phone # [phone]

## 2018-09-04 NOTE — MR AVS SNAPSHOT
"              After Visit Summary   9/4/2018    Ida Gayle    MRN: [id number]           Patient Information     Date Of Birth          1970        Visit Information        Provider Department      9/4/2018 11:30 AM Dulce Fofana PA-C [facility]        Today's Diagnoses     Acute recurrent maxillary sinusitis    -  1      Care Instructions    Consistent with sinusitis, could still be viral or eustachian tube dysfunction.  Recommend phenylephrine (OTC) or Advil Cold & Sinus during day as decongestant. Also use Flonase daily AM or PM. Continue taking ibuprofen alternating with tylenol every 2-3 hours for pain and inflammation.     If not improving, okay to start another course of Augmentin, but really should consider seeing ENT.            Follow-ups after your visit        Who to contact     If you have questions or need follow up information about today's clinic visit or your schedule please contact [facility] directly at [phone].  Normal or non-critical lab and imaging results will be communicated to you by [facility], letter or phone within 4 business days after the clinic has received the results. If you do not hear from us within 7 days, please contact the clinic through [email or web address] or phone. If you have a critical or abnormal lab result, we will notify you by phone as soon as possible.  Submit refill requests through [facility] or call your pharmacy and they will forward the refill request to us. Please allow 3 business days for your refill to be completed.          Additional Information About Your Visit        [facility] Information     [facility] lets you send messages to your doctor, view your test results, renew your prescriptions, schedule appointments and more. To sign up, go to [email or web address] . Click on \"Log in\" on the left side of the screen, which will take you to the Welcome page. Then click on \"Sign up Now\" on the right side of the " page.     You will be asked to enter the access code listed below, as well as some personal information. Please follow the directions to create your username and password.     Your access code is: [email or web address]  Expires: 12/3/2018 12:05 PM     Your access code will  in 90 days. If you need help or a new code, please call your [facility] clinic or [phone].        Care EveryWhere ID     This is your Care EveryWhere ID. This could be used by other organizations to access your [facility] medical records  [id number]        Your Vitals Were     Pulse Temperature Last Period Pulse Oximetry          83 98.8  F (37.1  C) (Oral) 2018 98%         Blood Pressure from Last 3 Encounters:   18 124/70   18 136/78   18 124/70    Weight from Last 3 Encounters:   16 124.3 kg (274 lb)   16 116.6 kg (257 lb)   14 105.2 kg (232 lb)              Today, you had the following     No orders found for display         Today's Medication Changes          These changes are accurate as of 18 12:05 PM.  If you have any questions, ask your nurse or doctor.               Start taking these medicines.        Dose/Directions    amoxicillin-clavulanate 875-125 MG per tablet   Commonly known as:  AUGMENTIN   Used for:  Acute recurrent maxillary sinusitis   Started by:  Dulce Fofana PA-C        Dose:  1 tablet   Take 1 tablet by mouth 2 times daily for 10 days   Quantity:  20 tablet   Refills:  0            Where to get your medicines      Some of these will need a paper prescription and others can be bought over the counter.  Ask your nurse if you have questions.     Bring a paper prescription for each of these medications     amoxicillin-clavulanate 875-125 MG per tablet                Primary Care Provider Office Phone # Fax #    Raffaele Cisneros [phone] [address] [address]        Equal Access to Services     PRAFUL LUCAS AH:  Hadii nelsy everett Sonewali, waaxda luqadaha, qaybta kaalmelisa garcia, jose maryin hayaatorsten chunvaishnavi bonilla lalizettetorsten aidan. So [facility] [phone].    ATENCIÓN: Si habla jennifer, tiene a reyes disposición servicios gratuitos de asistencia lingüística. Llame al [phone].    We comply with applicable federal civil rights laws and [place] laws. We do not discriminate on the basis of race, color, national origin, age, disability, sex, sexual orientation, or gender identity.            Thank you!     Thank you for choosing [facility]  for your care. Our goal is always to provide you with excellent care. Hearing back from our patients is one way we can continue to improve our services. Please take a few minutes to complete the written survey that you may receive in the mail after your visit with us. Thank you!             Your Updated Medication List - Protect others around you: Learn how to safely use, store and throw away your medicines at www.disposemymeds.org.          This list is accurate as of 9/4/18 12:05 PM.  Always use your most recent med list.                   Brand Name Dispense Instructions for use Diagnosis    amoxicillin-clavulanate 875-125 MG per tablet    AUGMENTIN    20 tablet    Take 1 tablet by mouth 2 times daily for 10 days    Acute recurrent maxillary sinusitis       OCREVUS IV           tiZANidine 4 MG tablet    ZANAFLEX    90 tablet    Take 1 tablet (4 mg) by mouth At Bedtime

## 2018-11-01 ENCOUNTER — TELEPHONE (OUTPATIENT)
Dept: FAMILY MEDICINE | Facility: CLINIC | Age: 48
End: 2018-11-01

## 2018-11-01 NOTE — TELEPHONE ENCOUNTER
Records printed, invoice in the amount of $41.78 faxed to [employer],    waiting on payment to release records.

## 2019-11-25 ENCOUNTER — OFFICE VISIT (OUTPATIENT)
Dept: FAMILY MEDICINE | Facility: CLINIC | Age: 49
End: 2019-11-25

## 2019-11-25 VITALS
RESPIRATION RATE: 20 BRPM | DIASTOLIC BLOOD PRESSURE: 72 MMHG | TEMPERATURE: 98.5 F | HEART RATE: 76 BPM | SYSTOLIC BLOOD PRESSURE: 122 MMHG

## 2019-11-25 DIAGNOSIS — R94.31 ABNORMAL ELECTROCARDIOGRAM: ICD-10-CM

## 2019-11-25 DIAGNOSIS — R00.2 PALPITATIONS: Primary | ICD-10-CM

## 2019-11-25 LAB
ERYTHROCYTE [DISTWIDTH] IN BLOOD BY AUTOMATED COUNT: 13.6 %
HCT VFR BLD AUTO: 42 % (ref 35–47)
HEMOGLOBIN: 13.9 G/DL (ref 11.7–15.7)
MCH RBC QN AUTO: 31.4 PG (ref 26–33)
MCHC RBC AUTO-ENTMCNC: 33.1 G/DL (ref 31–36)
MCV RBC AUTO: 94.8 FL (ref 78–100)
PLATELET COUNT - QUEST: 283 10^9/L (ref 150–375)
RBC # BLD AUTO: 4.43 10*12/L (ref 3.8–5.2)
WBC # BLD AUTO: 11.4 10*9/L (ref 4–11)

## 2019-11-25 PROCEDURE — 99214 OFFICE O/P EST MOD 30 MIN: CPT | Performed by: PHYSICIAN ASSISTANT

## 2019-11-25 PROCEDURE — 84443 ASSAY THYROID STIM HORMONE: CPT | Mod: 90 | Performed by: PHYSICIAN ASSISTANT

## 2019-11-25 PROCEDURE — 93000 ELECTROCARDIOGRAM COMPLETE: CPT | Performed by: PHYSICIAN ASSISTANT

## 2019-11-25 PROCEDURE — 36415 COLL VENOUS BLD VENIPUNCTURE: CPT | Performed by: PHYSICIAN ASSISTANT

## 2019-11-25 PROCEDURE — 84439 ASSAY OF FREE THYROXINE: CPT | Mod: 90 | Performed by: PHYSICIAN ASSISTANT

## 2019-11-25 PROCEDURE — 85027 COMPLETE CBC AUTOMATED: CPT | Performed by: PHYSICIAN ASSISTANT

## 2019-11-25 NOTE — NURSING NOTE
Questioned patient about current smoking habits.  Pt. has never smoked.  PULSE regular  My Chart:   CLASSIFICATION OF OVERWEIGHT AND OBESITY BY BMI                        Obesity Class           BMI(kg/m2)  Underweight                                    < 18.5  Normal                                         18.5-24.9  Overweight                                     25.0-29.9  OBESITY                     I                  30.0-34.9                             II                 35.0-39.9  EXTREME OBESITY             III                >40                            Patient's  BMI There is no height or weight on file to calculate BMI.  http://hin.nhlbi.nih.gov/menuplanner/menu.cgi  Pre-visit planning  Immunizations - up to date  Colonoscopy -   Mammogram -   Asthma -   PHQ9 -    XANDER-7 -

## 2019-11-25 NOTE — PROGRESS NOTES
"SUBJECTIVE:                                                    Ida Gayle is a 49 year old female who presents to clinic today for the following health issues:    Chief Complaint   Patient presents with     Consult       Ida Gayle is a 49 year old female who presents to clinic today for palpitations and feeling light-headed this morning. She states she went to workout this morning and on her way home, felt \"triple beats\" and began to feel light-headed while driving home. She denied drinking any coffee prior to the event.  Patient drove 10 min home and felt lightheaded during that time.    Fit bit showed pulse at 169 on her drive home.    She has been working out 5-6 days a week. Running and lifting weights    Has MS    Pt had another episode similar to this am about a month ago.  She was doing weight lifting.  She noted it was hard to catch her breath and she felt heart beating extra beats    Menses monthly but have decreased in length over the last 6 months  7 days -> 5 days -> 3 days    In clinic today, I had a physician assistant student with me.  The student participated in the office visit with the permission of the patient.        ROS:  Constitutional: NEGATIVE for fever, chills, change in weight  Eyes: NEGATIVE for vision changes or irritation  Ears: NEGATIVE for pain, discharge, decreased hearing  Nose: NEGATIVE for rhinorrhea, epistaxis or congestion  Mouth: NEGATIVE for ulcerations, sore throat.   R: NEGATIVE for significant cough or SOB  CV: NEGATIVE for chest pain or peripheral edema  GI: NEGATIVE for nausea, abdominal pain, heartburn, or change in bowel habits  : NEGATIVE for frequency, dysuria, or hematuria  Neuro: NEGATIVE for headaches        BP Readings from Last 3 Encounters:   11/25/19 122/72   09/04/18 124/70   07/09/18 136/78    Wt Readings from Last 3 Encounters:   06/22/16 124.3 kg (274 lb)   06/07/16 116.6 kg (257 lb)   08/21/14 105.2 kg (232 lb)            Patient Active " Problem List   Diagnosis     Health Care Home     ACP (advance care planning)     Major depressive disorder, recurrent episode, in full remission (H)     MS (multiple sclerosis) (H)- Dr Bolanos     MCI (mild cognitive impairment)     Immunosuppression (H)     Past Surgical History:   Procedure Laterality Date     C REIMPLANT URETER,DUPLICATED URETER  1974     CYSTECTOMY OVARIAN BENIGN  2010       Social History     Tobacco Use     Smoking status: Never Smoker     Smokeless tobacco: Never Used   Substance Use Topics     Alcohol use: Yes     Family History   Problem Relation Age of Onset     Prostate Cancer Father      Cancer Maternal Grandmother         skin cancer     Multiple Sclerosis Brother      Multiple Sclerosis Cousin      Diabetes No family hx of      C.A.D. No family hx of      Breast Cancer No family hx of      Cancer - colorectal No family hx of          Current Outpatient Medications   Medication Sig Dispense Refill     Ocrelizumab (OCREVUS IV)        tiZANidine (ZANAFLEX) 4 MG tablet Take 1 tablet (4 mg) by mouth At Bedtime 90 tablet        Allergies   Allergen Reactions     Azolen Tincture Hives     diflucan     Flagyl [Metronidazole] Hives     Sulfa Drugs        OBJECTIVE:                                                    /72 (BP Location: Right arm, Patient Position: Chair, Cuff Size: Adult Large)   Pulse 76   Temp 98.5  F (36.9  C) (Oral)   Resp 20   LMP 10/25/2019  There is no height or weight on file to calculate BMI.     GENERAL: alert and no distress  HEAD: Normocephalic, atraumatic  EYES: Eyes grossly normal to inspection, extraocular movements - intact  EARS:   Right: External ear and canal normal, TM normal  Left: External ear and canal normal, TM normal  NOSE: No discharge noted  MOUTH/THROAT: no ulcers, no lesions, pharynx non-erythematous, no exudates present, tonsils without hypertrophy, mucous membranes moist.   NECK: no tenderness, no adenopathy, no asymmetry, no masses, no  stiffness; thyroid- normal to palpation  RESP: lungs clear to auscultation - no crackles, no rhonchi, no wheezes  CV: regular rates and rhythm, normal S1 S2, no S3 or S4 and no murmur, no click or rub -    Labs Resulted Today:   Results for orders placed or performed in visit on 11/25/19   TSH ([facility])     Status: None   Result Value Ref Range    TSH 1.92 mIU/L   T4 free ([facility])     Status: None   Result Value Ref Range    T4 Free, Non-Dialysis 1.0 0.8 - 1.8 ng/dL   HEMOGRAM/PLATELET ([facility])     Status: Abnormal   Result Value Ref Range    WBC 11.4 (A) 4.0 - 11 10*9/L    RBC Count 4.43 3.8 - 5.2 10*12/L    Hemoglobin 13.9 11.7 - 15.7 g/dL    Hematocrit 42.0 35.0 - 47.0 %    MCV 94.8 78 - 100 fL    MCH 31.4 26 - 33 pg    MCHC 33.1 31 - 36 g/dL    RDW 13.6 %    Platelet Count 283 150 - 375 10^9/L       EKG: possible LA enlargement         ASSESSMENT/PLAN:                                                        ICD-10-CM    1. Palpitations R00.2 TSH ([facility])     T4 free ([facility])     VENOUS COLLECTION     HEMOGRAM/PLATELET ([facility])     EKG 12-lead complete w/read - Clinics     CARDIOLOGY EVAL ADULT REFERRAL   2. Abnormal electrocardiogram R94.31 CARDIOLOGY EVAL ADULT REFERRAL       Go to ED if sx return  Call 911 if unable to drive  Referred to Cardiology  Advised against vigorous physical activity until consult  Labs pending    [facility]

## 2019-11-25 NOTE — NURSING NOTE
Ida Gayle is here for a consult for her HR after working out today. [employer] was reading her pulse in the high 170's.  She is here to discuss this.

## 2019-11-26 LAB
T4, FREE, NON-DIALYSIS - QUEST: 1 NG/DL (ref 0.8–1.8)
TSH SERPL-ACNC: 1.92 MIU/L

## 2019-12-03 ENCOUNTER — OFFICE VISIT (OUTPATIENT)
Dept: FAMILY MEDICINE | Facility: CLINIC | Age: 49
End: 2019-12-03

## 2019-12-03 VITALS
TEMPERATURE: 98 F | HEART RATE: 72 BPM | OXYGEN SATURATION: 97 % | SYSTOLIC BLOOD PRESSURE: 118 MMHG | DIASTOLIC BLOOD PRESSURE: 68 MMHG

## 2019-12-03 DIAGNOSIS — D72.829 LEUKOCYTOSIS, UNSPECIFIED TYPE: Primary | ICD-10-CM

## 2019-12-03 LAB
ERYTHROCYTE [DISTWIDTH] IN BLOOD BY AUTOMATED COUNT: 13.3 %
HCT VFR BLD AUTO: 41.5 % (ref 35–47)
HEMOGLOBIN: 13.5 G/DL (ref 11.7–15.7)
MCH RBC QN AUTO: 31.5 PG (ref 26–33)
MCHC RBC AUTO-ENTMCNC: 32.5 G/DL (ref 31–36)
MCV RBC AUTO: 96.7 FL (ref 78–100)
PLATELET COUNT - QUEST: 229 10^9/L (ref 150–375)
RBC # BLD AUTO: 4.29 10*12/L (ref 3.8–5.2)
WBC # BLD AUTO: 7.8 10*9/L (ref 4–11)

## 2019-12-03 PROCEDURE — 85027 COMPLETE CBC AUTOMATED: CPT | Performed by: PHYSICIAN ASSISTANT

## 2019-12-03 PROCEDURE — 99213 OFFICE O/P EST LOW 20 MIN: CPT | Performed by: PHYSICIAN ASSISTANT

## 2019-12-03 PROCEDURE — 36415 COLL VENOUS BLD VENIPUNCTURE: CPT | Performed by: PHYSICIAN ASSISTANT

## 2019-12-03 NOTE — NURSING NOTE
Chief Complaint   Patient presents with     Follow Up     WBC elevated      Pre-visit Screening:  Immunizations:  up to date  Colonoscopy:  NA  Mammogram: NA  Asthma Action Test/Plan:  NA  PHQ9:  NA  GAD7:  NA  Questioned patient about current smoking habits Pt. has never smoked.  Ok to leave detailed message on voice mail for today's visit only yes, phone # [phone]

## 2019-12-03 NOTE — PROGRESS NOTES
SUBJECTIVE:                                                    Ida Gayle is a 49 year old female who presents to clinic today for the following health issues:    Chief Complaint   Patient presents with     Follow Up     WBC elevated          Here last week for palpitations.  WBC was elevated.   Has had several episodes of palpitations since last OV but none like last episode that prompted visit.     Appt with Cardiology on Thursday        ROS:  Constitutional: NEGATIVE for fever, chills, change in weight  Eyes: NEGATIVE for vision changes or irritation  Ears: NEGATIVE for pain, discharge, decreased hearing  Nose: NEGATIVE for rhinorrhea, epistaxis or congestion  Mouth: NEGATIVE for ulcerations, sore throat.   R: NEGATIVE for significant cough or SOB  CV: NEGATIVE for chest pain,  or peripheral edema  GI: NEGATIVE for nausea, abdominal pain, heartburn, or change in bowel habits  : NEGATIVE for frequency, dysuria, or hematuria  Neuro: NEGATIVE for headaches, weakness, syncope          BP Readings from Last 3 Encounters:   12/05/19 110/75   12/03/19 118/68   11/25/19 122/72    Wt Readings from Last 3 Encounters:   12/05/19 103.1 kg (227 lb 3.2 oz)   06/22/16 124.3 kg (274 lb)   06/07/16 116.6 kg (257 lb)            Patient Active Problem List   Diagnosis     Health Care Home     ACP (advance care planning)     Major depressive disorder, recurrent episode, in full remission (H)     MS (multiple sclerosis) (H)- Dr Bolanos     MCI (mild cognitive impairment)     Immunosuppression (H)     Past Surgical History:   Procedure Laterality Date     C REIMPLANT URETER,DUPLICATED URETER  1974     CYSTECTOMY OVARIAN BENIGN  2010       Social History     Tobacco Use     Smoking status: Never Smoker     Smokeless tobacco: Never Used   Substance Use Topics     Alcohol use: Yes     Comment: once per week     Family History   Problem Relation Age of Onset     Prostate Cancer Father      Cancer Maternal Grandmother          skin cancer     Multiple Sclerosis Brother      Multiple Sclerosis Cousin      Diabetes No family hx of      C.A.D. No family hx of      Breast Cancer No family hx of      Cancer - colorectal No family hx of          Current Outpatient Medications   Medication Sig Dispense Refill     Ocrelizumab (OCREVUS IV)        tiZANidine (ZANAFLEX) 4 MG tablet Take 1 tablet (4 mg) by mouth At Bedtime 90 tablet        Allergies   Allergen Reactions     Azolen Tincture Hives     diflucan     Flagyl [Metronidazole] Hives     Sulfa Drugs        OBJECTIVE:                                                    /68 (BP Location: Left arm, Patient Position: Sitting, Cuff Size: Adult Large)   Pulse 72   Temp 98  F (36.7  C) (Oral)   SpO2 97%  There is no height or weight on file to calculate BMI.     GENERAL: alert and no distress  HEAD: Normocephalic, atraumatic  EYES: Eyes grossly normal to inspection, extraocular movements - intact  EARS:   Right: External ear and canal normal, TM normal  Left: External ear and canal normal, TM normal  NOSE: No discharge noted  MOUTH/THROAT: no ulcers, no lesions, pharynx non-erythematous, no exudates present, tonsils without hypertrophy, mucous membranes moist.   NECK: no tenderness, no adenopathy, no asymmetry, no masses, no stiffness; thyroid- normal to palpation  RESP: lungs clear to auscultation - no crackles, no rhonchi, no wheezes  CV: regular rates and rhythm, normal S1 S2, no S3 or S4 and no murmur, no click or rub -         ASSESSMENT/PLAN:                                                        ICD-10-CM    1. Leukocytosis, unspecified type D72.829 HEMOGRAM/PLATELET ([facility])     VENOUS COLLECTION       Resolved  Follow-up with cardiology as planned      [facility]

## 2019-12-05 ENCOUNTER — OFFICE VISIT (OUTPATIENT)
Dept: CARDIOLOGY | Facility: CLINIC | Age: 49
End: 2019-12-05
Attending: PHYSICIAN ASSISTANT
Payer: COMMERCIAL

## 2019-12-05 VITALS
BODY MASS INDEX: 32.53 KG/M2 | DIASTOLIC BLOOD PRESSURE: 75 MMHG | HEIGHT: 70 IN | HEART RATE: 76 BPM | WEIGHT: 227.2 LBS | SYSTOLIC BLOOD PRESSURE: 110 MMHG

## 2019-12-05 DIAGNOSIS — R94.31 ABNORMAL ELECTROCARDIOGRAM: ICD-10-CM

## 2019-12-05 DIAGNOSIS — R00.2 PALPITATIONS: ICD-10-CM

## 2019-12-05 DIAGNOSIS — R07.89 OTHER CHEST PAIN: ICD-10-CM

## 2019-12-05 DIAGNOSIS — R00.2 PALPITATIONS: Primary | ICD-10-CM

## 2019-12-05 LAB
ANION GAP SERPL CALCULATED.3IONS-SCNC: 12.8 MMOL/L (ref 6–17)
BUN SERPL-MCNC: 15 MG/DL (ref 7–30)
CALCIUM SERPL-MCNC: 9.7 MG/DL (ref 8.5–10.5)
CHLORIDE SERPL-SCNC: 101 MMOL/L (ref 98–107)
CHOLEST SERPL-MCNC: 203 MG/DL
CO2 SERPL-SCNC: 27 MMOL/L (ref 23–29)
CREAT SERPL-MCNC: 0.86 MG/DL (ref 0.7–1.3)
GFR SERPL CREATININE-BSD FRML MDRD: 71 ML/MIN/{1.73_M2}
GLUCOSE SERPL-MCNC: 94 MG/DL (ref 70–105)
HDLC SERPL-MCNC: 65 MG/DL
LDLC SERPL CALC-MCNC: 116 MG/DL
NONHDLC SERPL-MCNC: 138 MG/DL
POTASSIUM SERPL-SCNC: 4.8 MMOL/L (ref 3.5–5.1)
SODIUM SERPL-SCNC: 136 MMOL/L (ref 136–145)
TRIGL SERPL-MCNC: 112 MG/DL

## 2019-12-05 PROCEDURE — 80061 LIPID PANEL: CPT | Performed by: INTERNAL MEDICINE

## 2019-12-05 PROCEDURE — 99204 OFFICE O/P NEW MOD 45 MIN: CPT | Performed by: INTERNAL MEDICINE

## 2019-12-05 PROCEDURE — 80048 BASIC METABOLIC PNL TOTAL CA: CPT | Performed by: INTERNAL MEDICINE

## 2019-12-05 PROCEDURE — 36415 COLL VENOUS BLD VENIPUNCTURE: CPT | Performed by: INTERNAL MEDICINE

## 2019-12-05 ASSESSMENT — MIFFLIN-ST. JEOR: SCORE: 1745.82

## 2019-12-05 NOTE — Clinical Note
12/5/2019    TRISTIAN Rowley  [address], [address]  [facility] [address]    RE: Ida Gayle       Dear Colleague,    I had the pleasure of seeing Ida Gayle in the [facility].    HPI and Plan:   See dictation    Orders Placed This Encounter   Procedures     Basic metabolic panel     Lipid Profile     Exercise Stress Test (Stress ECG)     Zio Patch Holter Adult Pediatric Greater than 48 hrs     Echocardiogram Complete       No orders of the defined types were placed in this encounter.      Encounter Diagnoses   Name Primary?     Palpitations Yes     Abnormal electrocardiogram      Other chest pain        CURRENT MEDICATIONS:  Current Outpatient Medications   Medication Sig Dispense Refill     Ocrelizumab (OCREVUS IV)        tiZANidine (ZANAFLEX) 4 MG tablet Take 1 tablet (4 mg) by mouth At Bedtime 90 tablet        ALLERGIES     Allergies   Allergen Reactions     Azolen Tincture Hives     diflucan     Flagyl [Metronidazole] Hives     Sulfa Drugs        PAST MEDICAL HISTORY:  Past Medical History:   Diagnosis Date     Duplicated ureter, left      Multiple sclerosis (H) 2017     Seizure (H)     As baby       PAST SURGICAL HISTORY:  Past Surgical History:   Procedure Laterality Date     C REIMPLANT URETER,DUPLICATED URETER  1974     CYSTECTOMY OVARIAN BENIGN  2010       FAMILY HISTORY:  Family History   Problem Relation Age of Onset     Prostate Cancer Father      Cancer Maternal Grandmother         skin cancer     Multiple Sclerosis Brother      Multiple Sclerosis Cousin      Diabetes No family hx of      C.A.D. No family hx of      Breast Cancer No family hx of      Cancer - colorectal No family hx of        SOCIAL HISTORY:  Social History     Socioeconomic History     Marital status:      Spouse name: None     Number of children: 1     Years of education: None     Highest education level: None   Occupational History     Occupation: make- up for  weddings     Employer: SELF   Social Needs     Financial resource strain: None     Food insecurity:     Worry: None     Inability: None     Transportation needs:     Medical: None     Non-medical: None   Tobacco Use     Smoking status: Never Smoker     Smokeless tobacco: Never Used   Substance and Sexual Activity     Alcohol use: Yes     Comment: once per week     Drug use: No     Sexual activity: Not Currently     Partners: Male   Lifestyle     Physical activity:     Days per week: None     Minutes per session: None     Stress: None   Relationships     Social connections:     Talks on phone: None     Gets together: None     Attends [employer] service: None     Active member of club or organization: None     Attends meetings of clubs or organizations: None     Relationship status: None     Intimate partner violence:     Fear of current or ex partner: None     Emotionally abused: None     Physically abused: None     Forced sexual activity: None   Other Topics Concern      Service No     Blood Transfusions No     Caffeine Concern No     Occupational Exposure No     Hobby Hazards No     Sleep Concern No     Stress Concern Yes     Comment: second marriage with children issues     Weight Concern Yes     Special Diet No     Back Care Not Asked     Exercise Yes     Comment: 2 times per week     Bike Helmet Not Asked     Seat Belt Yes     Self-Exams Not Asked     Parent/sibling w/ CABG, MI or angioplasty before 65F 55M? Not Asked   Social History Narrative     None       Review of Systems:  Skin:  Positive for rash   Eyes:  Positive for glasses  ENT:  Negative    Respiratory:  Positive for cough  Cardiovascular:    Positive for;chest pain;palpitations;dizziness;lightheadedness  Gastroenterology: Negative    Genitourinary:  Negative    Musculoskeletal:  Positive for joint pain  Neurologic:  Positive for headaches  Psychiatric:  Negative    Heme/Lymph/Imm:  Positive for allergies  Endocrine:  Negative      Physical  "Exam:  Vitals: /75   Pulse 76   Ht 1.778 m (5' 10\")   Wt 103.1 kg (227 lb 3.2 oz)   BMI 32.60 kg/m       Constitutional:  cooperative, alert and oriented, well developed, well nourished, in no acute distress        Skin:  warm and dry to the touch, no apparent skin lesions or masses noted          Head:  normocephalic, no masses or lesions        Eyes:  pupils equal and round, conjunctivae and lids unremarkable, sclera white, no xanthalasma, EOMS intact, no nystagmus        Lymph:No Cervical lymphadenopathy present     ENT:  no pallor or cyanosis, dentition good        Neck:  carotid pulses are full and equal bilaterally, JVP normal, no carotid bruit        Respiratory:  normal breath sounds, clear to auscultation, normal A-P diameter, normal symmetry, normal respiratory excursion, no use of accessory muscles         Cardiac: regular rhythm, normal S1/S2, no S3 or S4, apical impulse not displaced, no murmurs, gallops or rubs                pulses full and equal, no bruits auscultated                                        GI:  abdomen soft, non-tender, BS normoactive, no mass, no HSM, no bruits        Extremities and Muscular Skeletal:  no deformities, clubbing, cyanosis, erythema observed              Neurological:  no gross motor deficits        Psych:  Alert and Oriented x 3        Recent Lab Results:  LIPID RESULTS:  Lab Results   Component Value Date    CHOL 203 (H) 12/05/2019    HDL 65 12/05/2019     (H) 12/05/2019    TRIG 112 12/05/2019       LIVER ENZYME RESULTS:  Lab Results   Component Value Date    AST 10 03/13/2017    ALT 10 03/13/2017       CBC RESULTS:  Lab Results   Component Value Date    WBC 7.8 12/03/2019    RBC 4.29 12/03/2019    HGB 13.5 12/03/2019    HCT 41.5 12/03/2019    MCV 96.7 12/03/2019    MCH 31.5 12/03/2019    MCHC 32.5 12/03/2019    RDW 13.3 12/03/2019     12/03/2019     09/11/2014       BMP RESULTS:  Lab Results   Component Value Date     12/05/2019 "    POTASSIUM 4.8 12/05/2019    CHLORIDE 101 12/05/2019    CO2 27 12/05/2019    ANIONGAP 12.8 12/05/2019    GLC 94 12/05/2019    BUN 15 12/05/2019    CR 0.86 12/05/2019    GFRESTIMATED 71 12/05/2019    GFRESTBLACK 86 12/05/2019    MARGOT 9.7 12/05/2019        A1C RESULTS:  No results found for: A1C    INR RESULTS:  Lab Results   Component Value Date    INR 0.99 09/11/2014           CC  Karey King, PA  [address], [address]  [place] [address]                  Service Date: 12/05/2019      CLINIC NOTE      HISTORY OF PRESENT ILLNESS:  It is a pleasure for me to see this very pleasant 49-year-old lady at the request of Ms. Karey King for evaluation of palpitations, dizzy spells and an abnormal EKG.      This lady has no previous cardiac history.  She was assessed in the [facility] about 8 years ago and was told that she needs to improve her cardiovascular fitness.  There is no family history of premature atherosclerotic disease.  She is not diabetic or hypertensive and her cholesterol numbers are unknown at this time.  She does not smoke, drink or abuse drugs.  There is no excessive use of caffeinated products.        Current body mass index of 32.6.  She started exercising vigorously about a year ago and she has lost about 50 pounds.  She works out 5 times a week.  She does weights for 45 minutes and then she runs a mile in about 13 minutes.  She did this routine just over 10 days ago and was feeling fine afterwards.  About 10 minutes later, she was in her car when she experienced dizzy spells with a sensation of palpitations.  She looked at her Fitbit and saw that her heart rate was 170.  She drove home and the symptoms subsided.  This is the first-ever episode of similar symptoms, though she describes that she has had double heartbeat for quite some time.  I think she means palpitations.  She saw her primary physician recently and an EKG was done.  This showed sinus rhythm with possible left  atrial enlargement.  She was asked to see us for further evaluation.  Cardiac examination is unremarkable.  She denies any significant stresses in her life, though her  who was present describes her as a hyperactive person.  There were no previous syncopal episodes.  She does describe a nonspecific chest discomfort which does not occur on a regular basis and is not related to exertion.      It is difficult to know what the symptoms could be due to.  Fitbit heart rate monitoring tends not to be a very accurate, in my experience, especially if heart rates are high.  I will request an event monitor over the next 14 days to see if we detect any abnormal heart rhythms.  Given the infrequency of her symptoms, I advised an Apple iWatch series 4 or above which can record rhythm strips.  She has concerns about *** symptoms when she exercises and I will request an exercise EKG for further evaluation.  ECG findings of left atrial enlargement and not very sensitive or specific.  With her body mass index, I think there is a fair chance she may have left atrial enlargement and I will request an echocardiogram for further evaluation.  She has not had any electrolytes, glucose and lipid profile drawn recently.  I have taken the liberty of drawing blood for these numbers.  Followup will be arranged once I see her test results.         MONICA MAYO MD, [facility]             D: 2019   T: 2019   MT: CHUCK      Name:     LI BLAKE   MRN:      [id number]        Account:      [id number]   :      1970           Service Date: 2019      Document: [id number]       Thank you for allowing me to participate in the care of your patient.      Sincerely,     DR MONICA MAYO MD     [facility]    cc:   Karey King, PA  [address], [address]  [place] [address]

## 2019-12-05 NOTE — PROGRESS NOTES
Service Date: 12/05/2019      CLINIC NOTE      HISTORY OF PRESENT ILLNESS:  It is a pleasure for me to see this very pleasant 49-year-old lady at the request of Ms. Karey King for evaluation of palpitations, dizzy spells and an abnormal EKG.      This lady has no previous cardiac history.  She was assessed in the [facility] about 8 years ago and was told that she needs to improve her cardiovascular fitness.  There is no family history of premature atherosclerotic disease.  She is not diabetic or hypertensive and her cholesterol numbers are unknown at this time.  She does not smoke, drink or abuse drugs.  There is no excessive use of caffeinated products.        Current body mass index of 32.6.  She started exercising vigorously about a year ago and she has lost about 50 pounds.  She works out 5 times a week.  She does weights for 45 minutes and then she runs a mile in about 13 minutes.  She did this routine just over 10 days ago and was feeling fine afterwards.  About 10 minutes later, she was in her car when she experienced dizzy spells with a sensation of palpitations.  She looked at her Fitbit and saw that her heart rate was 170.  She drove home and the symptoms subsided.  This is the first-ever episode of similar symptoms, though she describes that she has had double heartbeat for quite some time.  I think she means palpitations.  She saw her primary physician recently and an EKG was done.  This showed sinus rhythm with possible left atrial enlargement.  She was asked to see us for further evaluation.  Cardiac examination is unremarkable.  She denies any significant stresses in her life, though her  who was present describes her as a hyperactive person.  There were no previous syncopal episodes.  She does describe a nonspecific chest discomfort which does not occur on a regular basis and is not related to exertion.      It is difficult to know what the symptoms could be due to.  Fitbit heart rate  monitoring tends not to be a very accurate, in my experience, especially if heart rates are high.  I will request an event monitor over the next 14 days to see if we detect any abnormal heart rhythms.  Given the infrequency of her symptoms, I advised an Apple iWatch series 4 or above which can record rhythm strips.  She has concerns about chest symptoms when she exercises and I will request an exercise EKG for further evaluation.  ECG findings of left atrial enlargement and not very sensitive or specific.  With her body mass index, I think there is a fair chance she may have left atrial enlargement and I will request an echocardiogram for further evaluation.  She has not had any electrolytes, glucose and lipid profile drawn recently.  I have taken the liberty of drawing blood for these numbers.  Followup will be arranged once I see her test results.         MONICA MAYO MD, [facility]             D: 2019   T: 2019   MT: CHUCK      Name:     LI BLAKE   MRN:      [id number]        Account:      [id number]   :      1970           Service Date: 2019      Document: [id number]

## 2019-12-05 NOTE — PROGRESS NOTES
HPI and Plan:   See dictation    Orders Placed This Encounter   Procedures     Basic metabolic panel     Lipid Profile     Exercise Stress Test (Stress ECG)     Zio Patch Holter Adult Pediatric Greater than 48 hrs     Echocardiogram Complete       No orders of the defined types were placed in this encounter.      Encounter Diagnoses   Name Primary?     Palpitations Yes     Abnormal electrocardiogram      Other chest pain        CURRENT MEDICATIONS:  Current Outpatient Medications   Medication Sig Dispense Refill     Ocrelizumab (OCREVUS IV)        tiZANidine (ZANAFLEX) 4 MG tablet Take 1 tablet (4 mg) by mouth At Bedtime 90 tablet        ALLERGIES     Allergies   Allergen Reactions     Azolen Tincture Hives     diflucan     Flagyl [Metronidazole] Hives     Sulfa Drugs        PAST MEDICAL HISTORY:  Past Medical History:   Diagnosis Date     Duplicated ureter, left      Multiple sclerosis (H) 2017     Seizure (H)     As baby       PAST SURGICAL HISTORY:  Past Surgical History:   Procedure Laterality Date     C REIMPLANT URETER,DUPLICATED URETER  1974     CYSTECTOMY OVARIAN BENIGN  2010       FAMILY HISTORY:  Family History   Problem Relation Age of Onset     Prostate Cancer Father      Cancer Maternal Grandmother         skin cancer     Multiple Sclerosis Brother      Multiple Sclerosis Cousin      Diabetes No family hx of      C.A.D. No family hx of      Breast Cancer No family hx of      Cancer - colorectal No family hx of        SOCIAL HISTORY:  Social History     Socioeconomic History     Marital status:      Spouse name: None     Number of children: 1     Years of education: None     Highest education level: None   Occupational History     Occupation: make- up for weddings     Employer: SELF   Social Needs     Financial resource strain: None     Food insecurity:     Worry: None     Inability: None     Transportation needs:     Medical: None     Non-medical: None   Tobacco Use     Smoking status: Never  "Smoker     Smokeless tobacco: Never Used   Substance and Sexual Activity     Alcohol use: Yes     Comment: once per week     Drug use: No     Sexual activity: Not Currently     Partners: Male   Lifestyle     Physical activity:     Days per week: None     Minutes per session: None     Stress: None   Relationships     Social connections:     Talks on phone: None     Gets together: None     Attends [employer] service: None     Active member of club or organization: None     Attends meetings of clubs or organizations: None     Relationship status: None     Intimate partner violence:     Fear of current or ex partner: None     Emotionally abused: None     Physically abused: None     Forced sexual activity: None   Other Topics Concern      Service No     Blood Transfusions No     Caffeine Concern No     Occupational Exposure No     Hobby Hazards No     Sleep Concern No     Stress Concern Yes     Comment: second marriage with children issues     Weight Concern Yes     Special Diet No     Back Care Not Asked     Exercise Yes     Comment: 2 times per week     Bike Helmet Not Asked     Seat Belt Yes     Self-Exams Not Asked     Parent/sibling w/ CABG, MI or angioplasty before 65F 55M? Not Asked   Social History Narrative     None       Review of Systems:  Skin:  Positive for rash   Eyes:  Positive for glasses  ENT:  Negative    Respiratory:  Positive for cough  Cardiovascular:    Positive for;chest pain;palpitations;dizziness;lightheadedness  Gastroenterology: Negative    Genitourinary:  Negative    Musculoskeletal:  Positive for joint pain  Neurologic:  Positive for headaches  Psychiatric:  Negative    Heme/Lymph/Imm:  Positive for allergies  Endocrine:  Negative      Physical Exam:  Vitals: /75   Pulse 76   Ht 1.778 m (5' 10\")   Wt 103.1 kg (227 lb 3.2 oz)   BMI 32.60 kg/m      Constitutional:  cooperative, alert and oriented, well developed, well nourished, in no acute distress        Skin:  warm and dry " to the touch, no apparent skin lesions or masses noted          Head:  normocephalic, no masses or lesions        Eyes:  pupils equal and round, conjunctivae and lids unremarkable, sclera white, no xanthalasma, EOMS intact, no nystagmus        Lymph:No Cervical lymphadenopathy present     ENT:  no pallor or cyanosis, dentition good        Neck:  carotid pulses are full and equal bilaterally, JVP normal, no carotid bruit        Respiratory:  normal breath sounds, clear to auscultation, normal A-P diameter, normal symmetry, normal respiratory excursion, no use of accessory muscles         Cardiac: regular rhythm, normal S1/S2, no S3 or S4, apical impulse not displaced, no murmurs, gallops or rubs                pulses full and equal, no bruits auscultated                                        GI:  abdomen soft, non-tender, BS normoactive, no mass, no HSM, no bruits        Extremities and Muscular Skeletal:  no deformities, clubbing, cyanosis, erythema observed              Neurological:  no gross motor deficits        Psych:  Alert and Oriented x 3        Recent Lab Results:  LIPID RESULTS:  Lab Results   Component Value Date    CHOL 203 (H) 12/05/2019    HDL 65 12/05/2019     (H) 12/05/2019    TRIG 112 12/05/2019       LIVER ENZYME RESULTS:  Lab Results   Component Value Date    AST 10 03/13/2017    ALT 10 03/13/2017       CBC RESULTS:  Lab Results   Component Value Date    WBC 7.8 12/03/2019    RBC 4.29 12/03/2019    HGB 13.5 12/03/2019    HCT 41.5 12/03/2019    MCV 96.7 12/03/2019    MCH 31.5 12/03/2019    MCHC 32.5 12/03/2019    RDW 13.3 12/03/2019     12/03/2019     09/11/2014       BMP RESULTS:  Lab Results   Component Value Date     12/05/2019    POTASSIUM 4.8 12/05/2019    CHLORIDE 101 12/05/2019    CO2 27 12/05/2019    ANIONGAP 12.8 12/05/2019    GLC 94 12/05/2019    BUN 15 12/05/2019    CR 0.86 12/05/2019    GFRESTIMATED 71 12/05/2019    GFRESTBLACK 86 12/05/2019    MARGOT 9.7  12/05/2019        A1C RESULTS:  No results found for: A1C    INR RESULTS:  Lab Results   Component Value Date    INR 0.99 09/11/2014           CC  TRISTIAN Rowley  [address], [facility] [address]  [place] [address]

## 2019-12-05 NOTE — LETTER
12/5/2019      TRISTIAN Rowley  [address]  [facility] [address]      RE: Ida Gayle       Dear Colleague,    I had the pleasure of seeing Ida Gayle in the [facility].    Service Date: 12/05/2019      CLINIC NOTE      HISTORY OF PRESENT ILLNESS:  It is a pleasure for me to see this very pleasant 49-year-old lady at the request of Ms. Karey King for evaluation of palpitations, dizzy spells and an abnormal EKG.      This lady has no previous cardiac history.  She was assessed in the [facility] about 8 years ago and was told that she needs to improve her cardiovascular fitness.  There is no family history of premature atherosclerotic disease.  She is not diabetic or hypertensive and her cholesterol numbers are unknown at this time.  She does not smoke, drink or abuse drugs.  There is no excessive use of caffeinated products.        Current body mass index of 32.6.  She started exercising vigorously about a year ago and she has lost about 50 pounds.  She works out 5 times a week.  She does weights for 45 minutes and then she runs a mile in about 13 minutes.  She did this routine just over 10 days ago and was feeling fine afterwards.  About 10 minutes later, she was in her car when she experienced dizzy spells with a sensation of palpitations.  She looked at her Fitbit and saw that her heart rate was 170.  She drove home and the symptoms subsided.  This is the first-ever episode of similar symptoms, though she describes that she has had double heartbeat for quite some time.  I think she means palpitations.  She saw her primary physician recently and an EKG was done.  This showed sinus rhythm with possible left atrial enlargement.  She was asked to see us for further evaluation.  Cardiac examination is unremarkable.  She denies any significant stresses in her life, though her  who was present describes her as a hyperactive person.   There were no previous syncopal episodes.  She does describe a nonspecific chest discomfort which does not occur on a regular basis and is not related to exertion.      It is difficult to know what the symptoms could be due to.  Fitbit heart rate monitoring tends not to be a very accurate, in my experience, especially if heart rates are high.  I will request an event monitor over the next 14 days to see if we detect any abnormal heart rhythms.  Given the infrequency of her symptoms, I advised an Apple iWatch series 4 or above which can record rhythm strips.  She has concerns about chest symptoms when she exercises and I will request an exercise EKG for further evaluation.  ECG findings of left atrial enlargement and not very sensitive or specific.  With her body mass index, I think there is a fair chance she may have left atrial enlargement and I will request an echocardiogram for further evaluation.  She has not had any electrolytes, glucose and lipid profile drawn recently.  I have taken the liberty of drawing blood for these numbers.  Followup will be arranged once I see her test results.         MONICA MAYO MD, [facility]             D: 2019   T: 2019   MT: CHUCK      Name:     LI BLAKE   MRN:      [id number]        Account:      [id number]   :      1970           Service Date: 2019      Document: [id number]           Outpatient Encounter Medications as of 2019   Medication Sig Dispense Refill     Ocrelizumab (OCREVUS IV)        tiZANidine (ZANAFLEX) 4 MG tablet Take 1 tablet (4 mg) by mouth At Bedtime 90 tablet      No facility-administered encounter medications on file as of 2019.        Again, thank you for allowing me to participate in the care of your patient.      Sincerely,    DR MONICA MAYO MD     [facility]

## 2019-12-11 ENCOUNTER — HOSPITAL ENCOUNTER (OUTPATIENT)
Dept: CARDIOLOGY | Facility: CLINIC | Age: 49
Discharge: HOME | End: 2019-12-11
Attending: INTERNAL MEDICINE | Admitting: INTERNAL MEDICINE
Payer: COMMERCIAL

## 2019-12-11 DIAGNOSIS — R94.31 ABNORMAL ELECTROCARDIOGRAM: ICD-10-CM

## 2019-12-11 DIAGNOSIS — R00.2 PALPITATIONS: ICD-10-CM

## 2019-12-11 DIAGNOSIS — R07.89 OTHER CHEST PAIN: ICD-10-CM

## 2019-12-11 PROCEDURE — 0298T ZIO PATCH HOLTER ADULT PEDIATRIC GREATER THAN 48 HRS: CPT | Performed by: INTERNAL MEDICINE

## 2019-12-11 PROCEDURE — 0296T ZIO PATCH HOLTER ADULT PEDIATRIC GREATER THAN 48 HRS: CPT

## 2019-12-12 ENCOUNTER — TRANSFERRED RECORDS (OUTPATIENT)
Dept: FAMILY MEDICINE | Facility: CLINIC | Age: 49
End: 2019-12-12

## 2019-12-26 ENCOUNTER — HOSPITAL ENCOUNTER (OUTPATIENT)
Dept: CARDIOLOGY | Facility: CLINIC | Age: 49
Discharge: HOME | End: 2019-12-26
Attending: INTERNAL MEDICINE | Admitting: INTERNAL MEDICINE
Payer: COMMERCIAL

## 2019-12-26 DIAGNOSIS — R00.2 PALPITATIONS: ICD-10-CM

## 2019-12-26 DIAGNOSIS — R07.89 OTHER CHEST PAIN: ICD-10-CM

## 2019-12-26 DIAGNOSIS — R94.31 ABNORMAL ELECTROCARDIOGRAM: ICD-10-CM

## 2019-12-26 PROCEDURE — 93306 TTE W/DOPPLER COMPLETE: CPT | Mod: 26 | Performed by: INTERNAL MEDICINE

## 2019-12-26 PROCEDURE — 93306 TTE W/DOPPLER COMPLETE: CPT

## 2019-12-30 ENCOUNTER — TELEPHONE (OUTPATIENT)
Dept: CARDIOLOGY | Facility: CLINIC | Age: 49
End: 2019-12-30

## 2019-12-30 NOTE — TELEPHONE ENCOUNTER
Patient notified of results and is scheduled for a stress test net week.  Will cut back on caffeine and see if palpitations have diminished.  Will call if has questions or concerns.       ----- Message from Cesar Charles MD sent at 12/30/2019  3:22 PM CST -----  Pls let her know all tests are normal.  No need for further cardiac work up at this time unless symptoms recur.  Follow up prn.  Thanks.

## 2020-01-28 ENCOUNTER — TELEPHONE (OUTPATIENT)
Dept: CARDIOLOGY | Facility: CLINIC | Age: 50
End: 2020-01-28

## 2020-01-28 NOTE — TELEPHONE ENCOUNTER
Ip, Cesar Figueroa MD  You 49 minutes ago (2:55 PM)      OK. Follow up prn in that case.  Thanks.

## 2020-01-28 NOTE — TELEPHONE ENCOUNTER
FYI-No followup ordered.    Javier Michael [facility] 3             Pt cxlled her stress test, she stated she is fine and does not want to do at this time     Javier

## 2020-02-05 ENCOUNTER — HOSPITAL ENCOUNTER (OUTPATIENT)
Dept: CARDIOLOGY | Facility: CLINIC | Age: 50
Discharge: HOME | End: 2020-02-05
Attending: INTERNAL MEDICINE | Admitting: INTERNAL MEDICINE
Payer: COMMERCIAL

## 2020-02-05 DIAGNOSIS — R07.89 OTHER CHEST PAIN: ICD-10-CM

## 2020-02-05 DIAGNOSIS — R94.31 ABNORMAL ELECTROCARDIOGRAM: ICD-10-CM

## 2020-02-05 DIAGNOSIS — R00.2 PALPITATIONS: ICD-10-CM

## 2020-02-05 PROCEDURE — 93018 CV STRESS TEST I&R ONLY: CPT | Performed by: INTERNAL MEDICINE

## 2020-02-05 PROCEDURE — 93016 CV STRESS TEST SUPVJ ONLY: CPT | Performed by: INTERNAL MEDICINE

## 2020-02-05 PROCEDURE — 93017 CV STRESS TEST TRACING ONLY: CPT

## 2020-02-06 ENCOUNTER — TELEPHONE (OUTPATIENT)
Dept: CARDIOLOGY | Facility: CLINIC | Age: 50
End: 2020-02-06

## 2020-02-06 NOTE — TELEPHONE ENCOUNTER
----- Message from Cesar Charles MD sent at 2/6/2020  8:42 AM CST -----  Pls let her know that results are normal.  Can further discuss at OV if she wishes.  (will need to make appt for follow up).  Thanks.

## 2020-02-06 NOTE — TELEPHONE ENCOUNTER
Phoned patient results of treadmill stress EKG. I told her the study was normal and that her HR/BP response to exercise was normal as well. I told her that she had some isolated PVC's. I also reviewed the echo she had a few weeks ago and assured her that her heart is structurally sound. She was very pleased to know this. I offered her a return visit to see Dr. Charles but she declined. We mutually agreed that we will see her on a PRN basis and I gave her our nurse line number. She had no other questions for me.

## 2020-02-10 ENCOUNTER — HEALTH MAINTENANCE LETTER (OUTPATIENT)
Age: 50
End: 2020-02-10

## 2020-02-11 ENCOUNTER — OFFICE VISIT (OUTPATIENT)
Dept: FAMILY MEDICINE | Facility: CLINIC | Age: 50
End: 2020-02-11

## 2020-02-11 VITALS
OXYGEN SATURATION: 98 % | HEART RATE: 74 BPM | TEMPERATURE: 98.6 F | SYSTOLIC BLOOD PRESSURE: 122 MMHG | DIASTOLIC BLOOD PRESSURE: 74 MMHG

## 2020-02-11 DIAGNOSIS — R07.0 THROAT PAIN: Primary | ICD-10-CM

## 2020-02-11 LAB — S PYO AG THROAT QL IA.RAPID: NORMAL

## 2020-02-11 PROCEDURE — 87880 STREP A ASSAY W/OPTIC: CPT | Performed by: PHYSICIAN ASSISTANT

## 2020-02-11 PROCEDURE — 87070 CULTURE OTHR SPECIMN AEROBIC: CPT | Performed by: PHYSICIAN ASSISTANT

## 2020-02-11 PROCEDURE — 99213 OFFICE O/P EST LOW 20 MIN: CPT | Performed by: PHYSICIAN ASSISTANT

## 2020-02-11 NOTE — NURSING NOTE
Ida is here today for a sore throat.    Pre-visit Screening:  Immunizations:  up to date  Colonoscopy:  NA  Mammogram: is up to date  Asthma Action Test/Plan:  NA  PHQ9:  NA  GAD7:  NA  Questioned patient about current smoking habits Pt. has never smoked.  Ok to leave detailed message on voice mail for today's visit only Yes, phone # [phone]

## 2020-02-11 NOTE — PROGRESS NOTES
SUBJECTIVE:                                                    Ida Gayle is a 49 year old female who presents to clinic today for the following health issues:    Chief Complaint   Patient presents with     Pharyngitis     sore throat started 2 days ago, feeling very run down         Ida is here with a sore throat.   Sx started 2 days ago. She denies fevers, rhinorrhea, cough, congestion.   On Sunday she had stomach ache and diarrhea which have resolved  No sick contacts.  Does have MS.     OTC: teas          ROS:  Constitutional: NEGATIVE for fever, chills, change in weight  Eyes: NEGATIVE for vision changes or irritation  Ears: NEGATIVE for pain, discharge, decreased hearing  Nose: NEGATIVE for rhinorrhea, epistaxis or congestion  R: NEGATIVE for significant cough or SOB  CV: NEGATIVE for chest pain, palpitations or peripheral edema  : NEGATIVE for frequency, dysuria, or hematuria  Neuro: NEGATIVE for headaches, weakness, syncope        BP Readings from Last 3 Encounters:   02/11/20 122/74   12/05/19 110/75   12/03/19 118/68    Wt Readings from Last 3 Encounters:   12/05/19 103.1 kg (227 lb 3.2 oz)   06/22/16 124.3 kg (274 lb)   06/07/16 116.6 kg (257 lb)            Patient Active Problem List   Diagnosis     Health Care Home     ACP (advance care planning)     Major depressive disorder, recurrent episode, in full remission (H)     MS (multiple sclerosis) (H)- Dr Bolanos     MCI (mild cognitive impairment)     Immunosuppression (H)     Past Surgical History:   Procedure Laterality Date     C REIMPLANT URETER,DUPLICATED URETER  1974     CYSTECTOMY OVARIAN BENIGN  2010       Social History     Tobacco Use     Smoking status: Never Smoker     Smokeless tobacco: Never Used   Substance Use Topics     Alcohol use: Yes     Comment: once per week     Family History   Problem Relation Age of Onset     Prostate Cancer Father      Cancer Maternal Grandmother         skin cancer     Multiple Sclerosis Brother       Multiple Sclerosis Cousin      Diabetes No family hx of      C.A.D. No family hx of      Breast Cancer No family hx of      Cancer - colorectal No family hx of          Current Outpatient Medications   Medication Sig Dispense Refill     Ocrelizumab (OCREVUS IV)        tiZANidine (ZANAFLEX) 4 MG tablet Take 1 tablet (4 mg) by mouth At Bedtime 90 tablet        Allergies   Allergen Reactions     Azolen Tincture Hives     diflucan     Flagyl [Metronidazole] Hives     Sulfa Drugs        OBJECTIVE:                                                    /74 (BP Location: Right arm, Patient Position: Sitting, Cuff Size: Adult Large)   Pulse 74   Temp 98.6  F (37  C) (Oral)   SpO2 98%  There is no height or weight on file to calculate BMI.     GENERAL: alert and no distress  HEAD: Normocephalic, atraumatic  EYES: Eyes grossly normal to inspection, extraocular movements - intact  EARS:   Right: External ear and canal normal, TM normal  Left: External ear and canal normal, TM normal  NOSE: No discharge noted  MOUTH/THROAT: no ulcers, no lesions, pharynx is erythematous, no exudates present, tonsils without hypertrophy, mucous membranes moist.   NECK: no tenderness, no adenopathy, no asymmetry, no masses, no stiffness; thyroid- normal to palpation  RESP: lungs clear to auscultation - no crackles, no rhonchi, no wheezes  CV: regular rates and rhythm, normal S1 S2, no S3 or S4 and no murmur, no click or rub -         ASSESSMENT/PLAN:                                                      1. Throat pain    - Rapid Strep Screen  - THROAT CULTURE (BFP)    Recommend Ibuprofen or Tylenol as tolerated for pain relief. Chloraseptic, cough drops advised.   RTC with worsening sore throat, fevers, difficulty swallowing. Strep Culture pending, will call if positive.      Karey King PA-C  [facility]

## 2020-02-13 LAB — THROAT CULTURE: NORMAL

## 2020-02-24 ENCOUNTER — OFFICE VISIT (OUTPATIENT)
Dept: FAMILY MEDICINE | Facility: CLINIC | Age: 50
End: 2020-02-24

## 2020-02-24 ENCOUNTER — MYC MEDICAL ADVICE (OUTPATIENT)
Dept: FAMILY MEDICINE | Facility: CLINIC | Age: 50
End: 2020-02-24

## 2020-02-24 VITALS
SYSTOLIC BLOOD PRESSURE: 120 MMHG | TEMPERATURE: 99.6 F | DIASTOLIC BLOOD PRESSURE: 74 MMHG | OXYGEN SATURATION: 97 % | HEART RATE: 101 BPM

## 2020-02-24 DIAGNOSIS — R52 BODY ACHES: ICD-10-CM

## 2020-02-24 DIAGNOSIS — R68.89 FLU-LIKE SYMPTOMS: ICD-10-CM

## 2020-02-24 DIAGNOSIS — J06.9 VIRAL URI: Primary | ICD-10-CM

## 2020-02-24 LAB
FLUAV AG UPPER RESP QL IA.RAPID: NORMAL
FLUBV AG UPPER RESP QL IA.RAPID: NORMAL

## 2020-02-24 PROCEDURE — 99213 OFFICE O/P EST LOW 20 MIN: CPT | Performed by: PHYSICIAN ASSISTANT

## 2020-02-24 PROCEDURE — 87804 INFLUENZA ASSAY W/OPTIC: CPT | Performed by: PHYSICIAN ASSISTANT

## 2020-02-24 NOTE — PROGRESS NOTES
SUBJECTIVE:                                                    Ida Gayle is a 49 year old female who presents to clinic today for the following health issues:    Chief Complaint   Patient presents with     Flu     headache started 4 days ago, woke up today with a fever of 102, chills, body aching all over, diarrhea and nausea     Woke with fevers 102 this am.  Has had a headache    Advil this am.   Slight rhinorrhea   with bad cough    + Body aches    + nausea          ROS:  Constitutional: + fevers and chills, body aches  Eyes: NEGATIVE for vision changes or irritation  Ears: NEGATIVE for pain, discharge, decreased hearing  Nose: slight rhinorrhea  Mouth: slight sore throat  R: cough started yesterday  CV: NEGATIVE for chest pain, palpitations or peripheral edema  GI: NEGATIVE for nausea  Some loose stools this.   : NEGATIVE for frequency, dysuria, or hematuria  Neuro: + headaches.         BP Readings from Last 3 Encounters:   02/24/20 120/74   02/11/20 122/74   12/05/19 110/75    Wt Readings from Last 3 Encounters:   12/05/19 103.1 kg (227 lb 3.2 oz)   06/22/16 124.3 kg (274 lb)   06/07/16 116.6 kg (257 lb)            Patient Active Problem List   Diagnosis     Health Care Home     ACP (advance care planning)     Major depressive disorder, recurrent episode, in full remission (H)     MS (multiple sclerosis) (H)- Dr Bolanos     MCI (mild cognitive impairment)     Immunosuppression (H)     Past Surgical History:   Procedure Laterality Date     C REIMPLANT URETER,DUPLICATED URETER  1974     CYSTECTOMY OVARIAN BENIGN  2010       Social History     Tobacco Use     Smoking status: Never Smoker     Smokeless tobacco: Never Used   Substance Use Topics     Alcohol use: Yes     Comment: once per week     Family History   Problem Relation Age of Onset     Prostate Cancer Father      Cancer Maternal Grandmother         skin cancer     Multiple Sclerosis Brother      Multiple Sclerosis Cousin      Diabetes  No family hx of      C.A.D. No family hx of      Breast Cancer No family hx of      Cancer - colorectal No family hx of          Current Outpatient Medications   Medication Sig Dispense Refill     Ocrelizumab (OCREVUS IV)        tiZANidine (ZANAFLEX) 4 MG tablet Take 1 tablet (4 mg) by mouth At Bedtime 90 tablet        Allergies   Allergen Reactions     Azolen Tincture Hives     diflucan     Flagyl [Metronidazole] Hives     Sulfa Drugs        OBJECTIVE:                                                    /74 (BP Location: Right arm, Patient Position: Sitting, Cuff Size: Adult Large)   Pulse 101   Temp 99.6  F (37.6  C) (Oral)   SpO2 97%  There is no height or weight on file to calculate BMI.     GENERAL: alert and no distress  HEAD: Normocephalic, atraumatic  EYES: Eyes grossly normal to inspection, extraocular movements - intact  EARS:   Right: External ear and canal normal, TM normal  Left: External ear and canal normal, TM normal  NOSE: No discharge noted  MOUTH/THROAT: no ulcers, no lesions, pharynx non-erythematous, no exudates present, tonsils without hypertrophy, mucous membranes moist.   NECK: no tenderness, no adenopathy, no asymmetry, no masses, no stiffness; thyroid- normal to palpation  RESP: lungs clear to auscultation - no crackles, no rhonchi, no wheezes  CV: regular rates and rhythm, normal S1 S2, no S3 or S4 and no murmur, no click or rub -      Labs Resulted Today:   Results for orders placed or performed in visit on 02/24/20   Influenza A and B (BFP)     Status: None   Result Value Ref Range    Influenza A neg neg    Influenza B neg neg            ASSESSMENT/PLAN:                                                        ICD-10-CM    1. Viral URI J06.9    2. Body aches R52 Influenza A and B (BFP)   3. Flu-like symptoms R68.89        Symptomatic therapy suggested: rest, increase fluids, OTC acetaminophen, ibuprofen. The patient is advised to Return to the clinic with any new/worsening symptoms  including persistent fevers, worsening cough, and shortness of breath.       Karey King PA-C  [facility]

## 2020-02-24 NOTE — NURSING NOTE
Ida is here today for flu like symptoms; headache, chills, fever and body aches.    Pre-visit Screening:  Immunizations:  up to date  Colonoscopy:  NA  Mammogram: is up to date  Asthma Action Test/Plan:  NA  PHQ9:  NA  GAD7:  NA  Questioned patient about current smoking habits Pt. has never smoked.  Ok to leave detailed message on voice mail for today's visit only Yes, phone # [phone]

## 2020-02-25 ENCOUNTER — OFFICE VISIT (OUTPATIENT)
Dept: FAMILY MEDICINE | Facility: CLINIC | Age: 50
End: 2020-02-25

## 2020-02-25 VITALS
DIASTOLIC BLOOD PRESSURE: 78 MMHG | OXYGEN SATURATION: 98 % | SYSTOLIC BLOOD PRESSURE: 118 MMHG | HEART RATE: 76 BPM | TEMPERATURE: 100 F

## 2020-02-25 DIAGNOSIS — D84.9 IMMUNOSUPPRESSION (H): ICD-10-CM

## 2020-02-25 DIAGNOSIS — J01.00 ACUTE NON-RECURRENT MAXILLARY SINUSITIS: Primary | ICD-10-CM

## 2020-02-25 DIAGNOSIS — G35 MS (MULTIPLE SCLEROSIS) (H): ICD-10-CM

## 2020-02-25 PROCEDURE — 99213 OFFICE O/P EST LOW 20 MIN: CPT | Performed by: PHYSICIAN ASSISTANT

## 2020-02-25 NOTE — PROGRESS NOTES
CC: Sinus symptoms    History:  Ida was here yesterday with 4-5 days of body aches, chills, and headaches.     Ida is here today with worsening facial pain, nasal congestion, ear pain. Chest continues to be tight, and burns when she coughs. Is getting mild teeth pain. Developed fever in the past 1-2 days where getting 100 degree temperature.     Supposed to get on an airplane in 2 days. Comes back and has next Ocrevus infusion 3/4/2020.    PMH, MEDICATIONS, ALLERGIES, SOCIAL AND FAMILY HISTORY in [facility] and reviewed by me personally.    ROS negative other than the symptoms noted above in the HPI.      Examination   /78 (BP Location: Left arm, Patient Position: Sitting, Cuff Size: Adult Large)   Pulse 76   Temp 100  F (37.8  C) (Oral)   SpO2 98%      Constitutional: Sitting comfortably, in no acute distress. Vital signs noted  Ears: external canals and TMs free of abnormalities  Nose: patent, without mucosal abnormalities  Mouth and throat: without erythema or lesions of the mucosa  Neck:  no adenopathy, trachea midline and normal to palpation  Cardiovascular:  regular rate and rhythm, no murmurs, clicks, or gallops  Respiratory:  normal respiratory rate and rhythm, lungs clear to auscultation  SKIN: No jaundice/pallor/rash.   Psychiatric: mentation appears normal and affect normal/bright      A/P    ICD-10-CM    1. Acute non-recurrent maxillary sinusitis J01.00 amoxicillin-clavulanate (AUGMENTIN) 875-125 MG tablet   2. Immunosuppression (H) D89.9    3. MS (multiple sclerosis) (H)- Dr Bolanos G35        DISCUSSION:  Given duration of symptoms, immunosuppression for MS, recommended Ida complete 10 day course of Augmentin. She should take this twice daily with food, and should consider taking probiotic or eating yogurt in between. Warned her of possible side effects including loose stool. Provided her with a handout on OTC therapies based on symptoms. She should contact me in 1 week if symptoms  are not improving, or sooner with any intolerable side effects or worsening symptoms.    follow up visit: As needed    [facility]

## 2020-02-25 NOTE — NURSING NOTE
Ida is here for sinus congestion, cough and chest tightness is much worse today, pt leaves Thursday for [place].          Pre-visit Screening:  Immunizations:  up to date  Colonoscopy:  NA  Mammogram: Up to date  Asthma Action Test/Plan:  NA  PHQ9:  None  GAD7:  None  Questioned patient about current smoking habits Pt. has never smoked.  Ok to leave detailed message on voice mail for today's visit only Yes, phone # [phone]

## 2020-11-09 LAB — PAP SMEAR - HIM PATIENT REPORTED: NEGATIVE

## 2020-11-16 ENCOUNTER — HEALTH MAINTENANCE LETTER (OUTPATIENT)
Age: 50
End: 2020-11-16

## 2021-01-15 ENCOUNTER — HEALTH MAINTENANCE LETTER (OUTPATIENT)
Age: 51
End: 2021-01-15

## 2021-02-01 ENCOUNTER — TELEPHONE (OUTPATIENT)
Dept: FAMILY MEDICINE | Facility: CLINIC | Age: 51
End: 2021-02-01

## 2021-02-01 NOTE — TELEPHONE ENCOUNTER
Pt had a virtual visit scheduled for today. Called pt prior to her appointment with PERNELL. Pt had a sinus infection and UTI that started on Friday. She called her OBGYN and they sent Augmentin 7 day supply in for her UTI. She feels it is helping her sinus infection at the same time. Cancelled her appt today, she will finish antibiotic, give it 1-2 weeks and reschedule virtual visit if needed.

## 2021-03-15 ENCOUNTER — MYC MEDICAL ADVICE (OUTPATIENT)
Dept: FAMILY MEDICINE | Facility: CLINIC | Age: 51
End: 2021-03-15

## 2021-03-23 ENCOUNTER — MYC MEDICAL ADVICE (OUTPATIENT)
Dept: FAMILY MEDICINE | Facility: CLINIC | Age: 51
End: 2021-03-23

## 2021-04-04 ENCOUNTER — HEALTH MAINTENANCE LETTER (OUTPATIENT)
Age: 51
End: 2021-04-04

## 2021-08-05 ENCOUNTER — TRANSFERRED RECORDS (OUTPATIENT)
Dept: FAMILY MEDICINE | Facility: CLINIC | Age: 51
End: 2021-08-05

## 2021-08-05 ENCOUNTER — OFFICE VISIT (OUTPATIENT)
Dept: FAMILY MEDICINE | Facility: CLINIC | Age: 51
End: 2021-08-05

## 2021-08-05 VITALS
SYSTOLIC BLOOD PRESSURE: 130 MMHG | HEART RATE: 75 BPM | OXYGEN SATURATION: 98 % | BODY MASS INDEX: 37.99 KG/M2 | TEMPERATURE: 97.5 F | WEIGHT: 265.4 LBS | DIASTOLIC BLOOD PRESSURE: 80 MMHG | HEIGHT: 70 IN

## 2021-08-05 DIAGNOSIS — G31.84 MCI (MILD COGNITIVE IMPAIRMENT): ICD-10-CM

## 2021-08-05 DIAGNOSIS — Z01.818 PREOP GENERAL PHYSICAL EXAM: Primary | ICD-10-CM

## 2021-08-05 DIAGNOSIS — G35 MS (MULTIPLE SCLEROSIS) (H): ICD-10-CM

## 2021-08-05 DIAGNOSIS — F33.42 MAJOR DEPRESSIVE DISORDER, RECURRENT EPISODE, IN FULL REMISSION (H): ICD-10-CM

## 2021-08-05 DIAGNOSIS — E66.9 OBESITY (BMI 30-39.9): ICD-10-CM

## 2021-08-05 DIAGNOSIS — F41.9 ANXIETY: ICD-10-CM

## 2021-08-05 DIAGNOSIS — D84.9 IMMUNOSUPPRESSION (H): ICD-10-CM

## 2021-08-05 DIAGNOSIS — N92.0 MENORRHAGIA WITH REGULAR CYCLE: ICD-10-CM

## 2021-08-05 LAB
ERYTHROCYTE [DISTWIDTH] IN BLOOD BY AUTOMATED COUNT: 12.7 %
HCT VFR BLD AUTO: 41.6 % (ref 35–47)
HEMOGLOBIN: 13.4 G/DL (ref 11.7–15.7)
MCH RBC QN AUTO: 30.7 PG (ref 26–33)
MCHC RBC AUTO-ENTMCNC: 32.2 G/DL (ref 31–36)
MCV RBC AUTO: 95.4 FL (ref 78–100)
PLATELET COUNT - QUEST: 291 10^9/L (ref 150–375)
PREG. TEST: NORMAL
RBC # BLD AUTO: 4.36 10*12/L (ref 3.8–5.2)
WBC # BLD AUTO: 8.8 10*9/L (ref 4–11)

## 2021-08-05 PROCEDURE — 85027 COMPLETE CBC AUTOMATED: CPT | Performed by: PHYSICIAN ASSISTANT

## 2021-08-05 PROCEDURE — 81025 URINE PREGNANCY TEST: CPT | Performed by: PHYSICIAN ASSISTANT

## 2021-08-05 PROCEDURE — 99214 OFFICE O/P EST MOD 30 MIN: CPT | Performed by: PHYSICIAN ASSISTANT

## 2021-08-05 PROCEDURE — 36415 COLL VENOUS BLD VENIPUNCTURE: CPT | Performed by: PHYSICIAN ASSISTANT

## 2021-08-05 RX ORDER — RIZATRIPTAN BENZOATE 10 MG/1
TABLET ORAL
COMMUNITY
Start: 2021-04-06

## 2021-08-05 RX ORDER — DIAZEPAM 5 MG
TABLET ORAL
COMMUNITY
Start: 2021-03-04

## 2021-08-05 ASSESSMENT — ANXIETY QUESTIONNAIRES
3. WORRYING TOO MUCH ABOUT DIFFERENT THINGS: NOT AT ALL
7. FEELING AFRAID AS IF SOMETHING AWFUL MIGHT HAPPEN: NOT AT ALL
1. FEELING NERVOUS, ANXIOUS, OR ON EDGE: NOT AT ALL
2. NOT BEING ABLE TO STOP OR CONTROL WORRYING: NOT AT ALL
GAD7 TOTAL SCORE: 0
IF YOU CHECKED OFF ANY PROBLEMS ON THIS QUESTIONNAIRE, HOW DIFFICULT HAVE THESE PROBLEMS MADE IT FOR YOU TO DO YOUR WORK, TAKE CARE OF THINGS AT HOME, OR GET ALONG WITH OTHER PEOPLE: NOT DIFFICULT AT ALL
6. BECOMING EASILY ANNOYED OR IRRITABLE: NOT AT ALL
5. BEING SO RESTLESS THAT IT IS HARD TO SIT STILL: NOT AT ALL

## 2021-08-05 ASSESSMENT — PATIENT HEALTH QUESTIONNAIRE - PHQ9
SUM OF ALL RESPONSES TO PHQ QUESTIONS 1-9: 4
5. POOR APPETITE OR OVEREATING: NOT AT ALL

## 2021-08-05 ASSESSMENT — MIFFLIN-ST. JEOR: SCORE: 1909.1

## 2021-08-05 NOTE — LETTER
My Depression Action Plan  Name: Ida Gayle   Date of Birth 1970  Date: 8/5/2021    My doctor: Karey King   My clinic: [facility]  [address] [place] [address]  [address]  [facility] [id number] [phone]          GREEN    ZONE   Good Control    What it looks like:     Things are going generally well. You have normal ups and downs. You may even feel depressed from time to time, but bad moods usually last less than a day.   What you need to do:  1. Continue to care for yourself (see self care plan)  2. Check your depression survival kit and update it as needed  3. Follow your physician s recommendations including any medication.  4. Do not stop taking medication unless you consult with your physician first.           YELLOW         ZONE Getting Worse    What it looks like:     Depression is starting to interfere with your life.     It may be hard to get out of bed; you may be starting to isolate yourself from others.    Symptoms of depression are starting to last most all day and this has happened for several days.     You may have suicidal thoughts but they are not constant.   What you need to do:     1. Call your care team. Your response to treatment will improve if you keep your care team informed of your progress. Yellow periods are signs an adjustment may need to be made.     2. Continue your self-care.  Just get dressed and ready for the day.  Don't give yourself time to talk yourself out of it.    3. Talk to someone in your support network.    4. Open up your Depression Self-Care Plan/Wellness Kit.           RED    ZONE Medical Alert - Get Help    What it looks like:     Depression is seriously interfering with your life.     You may experience these or other symptoms: You can t get out of bed most days, can t work or engage in other necessary activities, you have trouble taking care of basic hygiene, or basic responsibilities, thoughts of suicide or  death that will not go away, self-injurious behavior.     What you need to do:  1. Call your care team and request a same-day appointment. If they are not available (weekends or after hours) call your local crisis line, emergency room or 911.          Depression Self-Care Plan / Wellness Kit    Many people find that medication and therapy are helpful treatments for managing depression. In addition, making small changes to your everyday life can help to boost your mood and improve your wellbeing. Below are some tips for you to consider. Be sure to talk with your medical provider and/or behavioral health consultant if your symptoms are worsening or not improving.     Sleep   Sleep hygiene  means all of the habits that support good, restful sleep. It includes maintaining a consistent bedtime and wake time, using your bedroom only for sleeping or sex, and keeping the bedroom dark and free of distractions like a computer, smartphone, or television.     Develop a Healthy Routine  Maintain good hygiene. Get out of bed in the morning, make your bed, brush your teeth, take a shower, and get dressed. Don t spend too much time viewing media that makes you feel stressed. Find time to relax each day.    Exercise  Get some form of exercise every day. This will help reduce pain and release endorphins, the  feel good  chemicals in your brain. It can be as simple as just going for a walk or doing some gardening, anything that will get you moving.      Diet  Strive to eat healthy foods, including fruits and vegetables. Drink plenty of water. Avoid excessive sugar, caffeine, alcohol, and other mood-altering substances.     Stay Connected with Others  Stay in touch with friends and family members.    Manage Your Mood  Try deep breathing, massage therapy, biofeedback, or meditation. Take part in fun activities when you can. Try to find something to smile about each day.     Psychotherapy  Be open to working with a therapist if your  provider recommends it.     Medication  Be sure to take your medication as prescribed. Most anti-depressants need to be taken every day. It usually takes several weeks for medications to work. Not all medicines work for all people. It is important to follow-up with your provider to make sure you have a treatment plan that is working for you. Do not stop your medication abruptly without first discussing it with your provider.    Crisis Resources   These hotlines are for both adults and children. They and are open 24 hours a day, 7 days a week unless noted otherwise.      National Suicide Prevention Lifeline   1-213-327-PVCY (5416)      Crisis Text Line    www.crisistextline.org  Text HOME to 388467 from anywhere in the [place], anytime, about any type of crisis. A live, trained crisis counselor will receive the text and respond quickly.      [place] Lifeline for LGBTQ Youth  A national crisis intervention and suicide lifeline for LGBTQ youth under 25. Provides a safe place to talk without judgement. Call 1-607.930.9970; text START to 653182 or visit www.thetrevorproject.org to talk to a trained counselor.      For [facility] crisis numbers, visit the [facility] website at:  https://[place].gov/dhs/people-we-serve/adults/health-care/mental-health/resources/crisis-contacts.jsp

## 2021-08-05 NOTE — PROGRESS NOTES
[facility]YS[facility]  [address] [place] [address]  [address]  [facility] [address]  Phone: [phone]  Fax: [phone]  Primary Provider: Fallon Diggs  Pre-op Performing Provider: FALLON DIGGS      PREOPERATIVE EVALUATION:  Today's date: 8/5/2021    Ida Gayle is a 51 year old female who presents for a preoperative evaluation.    Surgical Information:  Surgery/Procedure: [facility]   Surgery Location: [facility]  Surgeon: Dr. Casas  Surgery Date: 8/20/2021  Time of Surgery: 7am  Where patient plans to recover: At home with family  Fax number for surgical facility: [phone]    Type of Anesthesia Anticipated: to be determined    Assessment & Plan     The proposed surgical procedure is considered INTERMEDIATE risk.    1. Preop general physical exam    2. Menorrhagia with regular cycle    3. Major depressive disorder, recurrent episode, in full remission (H)    4. MS (multiple sclerosis) (H)- Dr Bolanos    5. Immunosuppression (H)    6. MCI (mild cognitive impairment)    7. Obesity (BMI 30-39.9)    8. Anxiety            Risks and Recommendations:  The patient has the following additional risks and recommendations for perioperative complications:   - No identified additional risk factors other than previously addressed    Medication Instructions:  Patient is on no chronic medications    RECOMMENDATION:  APPROVAL GIVEN to proceed with proposed procedure, without further diagnostic evaluation.        Subjective     HPI related to upcoming procedure: Menorrhagia for 2 years    1. No - Have you ever had a heart attack or stroke?  2. No - Have you ever had surgery on your heart or blood vessels, such as a stent, coronary (heart) bypass, or surgery on an artery in the head, neck, heart, or legs?  3. No - Do you have chest pain when you are physically active?  4. No - Do you have a history of heart failure?  5. No - Do you currently have a cold, bronchitis, or  symptoms of other respiratory (head and chest) infections?  6. No - Do you have a cough, shortness of breath, or wheezing?  7. No - Do you or anyone in your family have a history of blood clots?  8. No - Do you or anyone in your family have a serious bleeding problem, such as long-lasting bleeding after surgeries or cuts?  9. No - Have you ever had anemia or been told to take iron pills?  10.  YES - Have you had any abnormal blood loss such as black, tarry or bloody stools, or abnormal vaginal bleeding? 2 years heavy menses  11. No - Have you ever had a blood transfusion?  12. Yes - Are you willing to have a blood transfusion if it is medically needed before, during, or after your surgery?  13. No - Have you or anyone in your family ever had problems with anesthesia (sedation for surgery)?  14. No - Do you have sleep apnea, excessive snoring, or daytime drowsiness?   15. No - Do you have any artifical heart valves or other implanted medical devices, such as a pacemaker, defibrillator, or continuous glucose monitor?  16. No - Do you have any artifical joints?  17. No - Are you allergic to latex?  18. No - Is there any chance that you may be pregnant?    Health Care Directive:  Patient does not have a Health Care Directive or Living Will: Patient states has Advance Directive and will bring in a copy to clinic.    Preoperative Review of :   reviewed - controlled substances reflected in medication list.      Status of Chronic Conditions:  See problem list for active medical problems.  Problems all longstanding and stable, except as noted/documented.  See ROS for pertinent symptoms related to these conditions.      Review of Systems  CONSTITUTIONAL: NEGATIVE for fever, chills, change in weight  INTEGUMENTARY/SKIN: NEGATIVE for worrisome rashes, moles or lesions  EYES: NEGATIVE for vision changes or irritation  ENT/MOUTH: NEGATIVE for ear, mouth and throat problems  RESP: NEGATIVE for significant cough or SOB  CV:  NEGATIVE for chest pain, palpitations or peripheral edema  GI: NEGATIVE for nausea, abdominal pain, heartburn, or change in bowel habits  : NEGATIVE for frequency, dysuria, or hematuria  MUSCULOSKELETAL: NEGATIVE for significant arthralgias or myalgia  NEURO: NEGATIVE for weakness, dizziness or paresthesias  ENDOCRINE: NEGATIVE for temperature intolerance, skin/hair changes  HEME: as above  PSYCHIATRIC: NEGATIVE for changes in mood or affect    Patient Active Problem List    Diagnosis Date Noted     Obesity (BMI 30-39.9) 08/05/2021     Priority: Medium     Anxiety 08/05/2021     Priority: Medium     Immunosuppression (H) 06/27/2018     Priority: Medium     MCI (mild cognitive impairment) 04/25/2017     Priority: Medium     neuropsych testing with very mild changes likely related to MS and previous learning disorders-does not affect daily functioning to any significant degree by testing       MS (multiple sclerosis) (H)- Dr Bolanos 04/15/2017     Priority: Medium     Neurology [facility]       ACP (advance care planning) 05/02/2014     Priority: Medium       Advance Care Planning 2/16/2016: ACP Review of Chart / Resources Provided:  Reviewed chart for advance care plan.  Ida Gayle has no plan or code status on file. Discussed available resources and provided with information. Confirmed code status reflects current choices pending further ACP discussions.  Confirmed/documented legally designated decision makers.  Added by Emelina King               Major depressive disorder, recurrent episode, in full remission (H) 05/02/2014     Priority: Medium     Health Care Home 09/11/2013     Priority: Medium     State Tier Level:  0  Status:  n/a  Care Coordinator:      See Letters for [facility] Care Plan            Past Medical History:   Diagnosis Date     Duplicated ureter, left      Multiple sclerosis (H) 2017     Seizure (H)     As baby     Past Surgical History:   Procedure Laterality Date     C REIMPLANT  "URETER,DUPLICATED URETER  1974     CYSTECTOMY OVARIAN BENIGN  2010     Current Outpatient Medications   Medication Sig Dispense Refill     diazepam (VALIUM) 5 MG tablet TAKE 1-2 TABLETS BY MOUTH ONCE DAILY AS NEEDED FOR ANXIETY       Ocrelizumab (OCREVUS IV)        rizatriptan (MAXALT) 10 MG tablet TAKE 1 TABLET BY MOUTH AT ONSET OF MIGRAINE MAY REPEAT 1 TIME IN 24 HOURS. NOT TO EXCEED 2 IN 24 HOURS       tiZANidine (ZANAFLEX) 4 MG tablet Take 1 tablet (4 mg) by mouth At Bedtime 90 tablet        Allergies   Allergen Reactions     Azolen Tincture Hives     diflucan     Flagyl [Metronidazole] Hives     Sulfa Drugs         Social History     Tobacco Use     Smoking status: Never Smoker     Smokeless tobacco: Never Used   Substance Use Topics     Alcohol use: Yes     Comment: once per week     Family History   Problem Relation Age of Onset     Prostate Cancer Father      Cancer Maternal Grandmother         skin cancer     Multiple Sclerosis Brother      Multiple Sclerosis Cousin      Diabetes No family hx of      C.A.D. No family hx of      Breast Cancer No family hx of      Cancer - colorectal No family hx of      History   Drug Use No         Objective     /80 (BP Location: Right arm, Patient Position: Sitting, Cuff Size: Adult Large)   Pulse 75   Temp 97.5  F (36.4  C)   Ht 1.778 m (5' 10\")   Wt 120.4 kg (265 lb 6.4 oz)   LMP 08/02/2021 (Approximate)   SpO2 98%   BMI 38.08 kg/m      Physical Exam    GENERAL APPEARANCE: healthy, alert and no distress     EYES: EOMI, PERRL     HENT: ear canals and TM's normal and nose and mouth without ulcers or lesions     NECK: no adenopathy, no asymmetry, masses, or scars and thyroid normal to palpation     RESP: lungs clear to auscultation - no rales, rhonchi or wheezes     CV: regular rates and rhythm, normal S1 S2, no S3 or S4 and no murmur, click or rub     ABDOMEN:  soft, nontender, no HSM or masses and bowel sounds normal     MS: extremities normal- no gross " deformities noted, no evidence of inflammation in joints, FROM in all extremities.     SKIN: no suspicious lesions or rashes     NEURO: Normal strength and tone, sensory exam grossly normal, mentation intact and speech normal     PSYCH: mentation appears normal. and affect normal/bright     LYMPHATICS: No cervical adenopathy    Recent Labs   Lab Test 12/05/19  1007 12/03/19  1316 11/25/19  0000   HGB  --  13.5 13.9   PLT  --  229 283     --   --    POTASSIUM 4.8  --   --    CR 0.86  --   --         Diagnostics:  Labs Resulted Today:   Results for orders placed or performed in visit on 08/05/21   Hemogram Platelet (BFP)     Status: None   Result Value Ref Range    WBC 8.8 4.0 - 11 10*9/L    RBC Count 4.36 3.8 - 5.2 10*12/L    Hemoglobin 13.4 11.7 - 15.7 g/dL    Hematocrit 41.6 35.0 - 47.0 %    MCV 95.4 78 - 100 fL    MCH 30.7 26 - 33 pg    MCHC 32.2 31 - 36 g/dL    RDW 12.7 %    Platelet Count 291 150 - 375 10^9/L   Urine Pregnancy Test (BFP)     Status: None   Result Value Ref Range    Pregnancy Test NEG            No EKG required, no history of coronary heart disease, significant arrhythmia, peripheral arterial disease or other structural heart disease.    Revised Cardiac Risk Index (RCRI):  The patient has the following serious cardiovascular risks for perioperative complications:   - No serious cardiac risks = 0 points     RCRI Interpretation: 0 points: Class I (very low risk - 0.4% complication rate)           Signed Electronically by: TRISTIAN Rowley  Copy of this evaluation report is provided to requesting physician.

## 2021-08-06 ASSESSMENT — ANXIETY QUESTIONNAIRES: GAD7 TOTAL SCORE: 0

## 2021-08-20 ENCOUNTER — LAB REQUISITION (OUTPATIENT)
Dept: LAB | Facility: CLINIC | Age: 51
End: 2021-08-20
Payer: COMMERCIAL

## 2021-08-20 PROCEDURE — 88307 TISSUE EXAM BY PATHOLOGIST: CPT | Performed by: PATHOLOGY

## 2021-08-23 LAB
PATH REPORT.COMMENTS IMP SPEC: NORMAL
PATH REPORT.COMMENTS IMP SPEC: NORMAL
PATH REPORT.FINAL DX SPEC: NORMAL
PATH REPORT.GROSS SPEC: NORMAL
PATH REPORT.MICROSCOPIC SPEC OTHER STN: NORMAL
PATH REPORT.RELEVANT HX SPEC: NORMAL
PHOTO IMAGE: NORMAL

## 2021-09-08 ENCOUNTER — TRANSFERRED RECORDS (OUTPATIENT)
Dept: FAMILY MEDICINE | Facility: CLINIC | Age: 51
End: 2021-09-08

## 2021-09-18 ENCOUNTER — HEALTH MAINTENANCE LETTER (OUTPATIENT)
Age: 51
End: 2021-09-18

## 2021-11-03 ENCOUNTER — OFFICE VISIT (OUTPATIENT)
Dept: FAMILY MEDICINE | Facility: CLINIC | Age: 51
End: 2021-11-03

## 2021-11-03 VITALS — DIASTOLIC BLOOD PRESSURE: 84 MMHG | SYSTOLIC BLOOD PRESSURE: 124 MMHG

## 2021-11-03 DIAGNOSIS — J01.90 ACUTE SINUSITIS WITH SYMPTOMS > 10 DAYS: ICD-10-CM

## 2021-11-03 DIAGNOSIS — R51.9 ACUTE NONINTRACTABLE HEADACHE, UNSPECIFIED HEADACHE TYPE: Primary | ICD-10-CM

## 2021-11-03 DIAGNOSIS — E66.9 OBESITY (BMI 30-39.9): ICD-10-CM

## 2021-11-03 DIAGNOSIS — J34.89 RHINORRHEA: ICD-10-CM

## 2021-11-03 LAB — COVID-19: NEGATIVE

## 2021-11-03 PROCEDURE — 99213 OFFICE O/P EST LOW 20 MIN: CPT | Performed by: PHYSICIAN ASSISTANT

## 2021-11-03 PROCEDURE — 87635 SARS-COV-2 COVID-19 AMP PRB: CPT | Performed by: PHYSICIAN ASSISTANT

## 2021-11-03 PROCEDURE — G2023 SPECIMEN COLLECT COVID-19: HCPCS | Performed by: PHYSICIAN ASSISTANT

## 2021-11-03 RX ORDER — FLUCONAZOLE 150 MG/1
150 TABLET ORAL ONCE
Qty: 1 TABLET | Refills: 0 | Status: SHIPPED | OUTPATIENT
Start: 2021-11-03 | End: 2021-11-03

## 2021-11-03 NOTE — NURSING NOTE
Chief Complaint   Patient presents with     Sinus Problem     headaches, pressure, ears are plugged.     Pre-visit Screening:  Immunizations:  not up to date - declined flu at this time  Colonoscopy:  NA  Mammogram: is due and to be scheduled by patient for later completion  Asthma Action Test/Plan:  NA  PHQ9:  No concerns  GAD7:  No concerns  Questioned patient about current smoking habits Pt. has never smoked.  Ok to leave detailed message on voice mail for today's visit only YES, phone # [phone]

## 2021-11-03 NOTE — PROGRESS NOTES
Assessment & Plan     Acute nonintractable headache, unspecified headache type    - COVID-19 (B[facility])  - [place] SPECIMEN COLLECT COVID-19    Rhinorrhea    - COVID-19 ([facility])  - [place] SPECIMEN COLLECT COVID-19    Obesity (BMI 30-39.9)    - Adult Endocrinology Referral; Future    Acute sinusitis with symptoms > 10 days    - amoxicillin-clavulanate (AUGMENTIN) 875-125 MG tablet; Take 1 tablet by mouth 2 times daily  - fluconazole (DIFLUCAN) 150 MG tablet; Take 1 tablet (150 mg) by mouth once for 1 dose      Augmentin prescribed.  Diflucan in case she developed yeast infection.  RTC if not improving as expected    TRISTIAN Rowley  [facility]YSIC[facility]ANS    Subjective     Nursing Notes:   So Frost  11/3/2021  1:00 PM  Addendum  Chief Complaint   Patient presents with     Sinus Problem     headaches, pressure, ears are plugged.     Pre-visit Screening:  Immunizations:  not up to date - declined flu at this time  Colonoscopy:  NA  Mammogram: is due and to be scheduled by patient for later completion  Asthma Action Test/Plan:  NA  PHQ9:  No concerns  GAD7:  No concerns  Questioned patient about current smoking habits Pt. has never smoked.  Ok to leave detailed message on voice mail for today's visit only YES, phone # [phone]          In clinic today, I had a physician assistant student with me.  The student participated in the office visit with the permission of the patient including the history taking, physical examination, and documentation.       Ida Gayle is a 51 year old female who presents to clinic today for the following health issues:     HPI     Ida is here with URI sx.  Her  was in a conference with gentleman on 10/18 and that co-worker was dg. With COVID 10/19.  Ida and her  both have had 3 COVID vaccines.  Had neg COVID test on 10/24.  Pt states for the last 6 days has had sneezing, headache, sore throat, rhinorrhea. Pt has MS. She gets sinus infections  commonly this time of year.  She also notes decreased hearing in right ear.              Objective    /84 (BP Location: Left arm, Patient Position: Sitting, Cuff Size: Adult Large)   There is no height or weight on file to calculate BMI.  Physical Exam   GENERAL: healthy, alert and no distress  EYES: Eyes grossly normal to inspection, PERRL and conjunctivae and sclerae normal  HENT: ear canals and TM's normal, nose and mouth without ulcers or lesions  NECK: no adenopathy, no asymmetry, masses, or scars and thyroid normal to palpation  RESP: lungs clear to auscultation - no rales, rhonchi or wheezes  CV: regular rate and rhythm, normal S1 S2, no S3 or S4, no murmur, click or rub, no peripheral edema and peripheral pulses strong  MS: no gross musculoskeletal defects noted, no edema    COVID neg

## 2021-12-07 ENCOUNTER — OFFICE VISIT (OUTPATIENT)
Dept: FAMILY MEDICINE | Facility: CLINIC | Age: 51
End: 2021-12-07

## 2021-12-07 VITALS
TEMPERATURE: 98.9 F | BODY MASS INDEX: 38.63 KG/M2 | OXYGEN SATURATION: 97 % | HEIGHT: 70 IN | HEART RATE: 100 BPM | SYSTOLIC BLOOD PRESSURE: 122 MMHG | DIASTOLIC BLOOD PRESSURE: 84 MMHG

## 2021-12-07 DIAGNOSIS — D84.9 IMMUNOCOMPROMISED (H): ICD-10-CM

## 2021-12-07 DIAGNOSIS — G35 MS (MULTIPLE SCLEROSIS) (H): ICD-10-CM

## 2021-12-07 DIAGNOSIS — J01.01 ACUTE RECURRENT MAXILLARY SINUSITIS: Primary | ICD-10-CM

## 2021-12-07 PROCEDURE — 99213 OFFICE O/P EST LOW 20 MIN: CPT | Performed by: PHYSICIAN ASSISTANT

## 2021-12-07 NOTE — NURSING NOTE
Chief Complaint   Patient presents with     Sinus Problem     sinus congestion that is very painful for 3 days, was seen for a sinus infection a month ago, neg covid test this morning         Pre-visit Screening:  Immunizations:  up to date  Colonoscopy:  NA  Mammogram: NA  Asthma Action Test/Plan:  NA  PHQ9:  NA  GAD7:  NA  Questioned patient about current smoking habits Pt. has never smoked.  Ok to leave detailed message on voice mail for today's visit only Yes, phone # [phone]

## 2021-12-07 NOTE — PROGRESS NOTES
"CC: Sinus symptoms    History:  Ida was here 11/3/2021 with 6 days of sinus symptoms. Had negative covid-19 test, and was started on Augmentin twice daily for 10 days. She stopped at day 9 due to significant diarrhea. By the time she finished it, was feeling back to normal.     Then about 1 week ago, started to feel headache and facial pressure over cheeks. Gets drainage built up in the morning, and some during the day as well. Then today pain in face is more severe. No fever, sweats, chills, cough, SOB, sore throat, teeth pain.  Has been treated with Netipot, and [facility] [place] Cold. Had negative Covid-19 test this morning through  Labs.     Of note is that Ida is immunocompromised with MS. Does Ocrevus infusions, and had one last week.     PMH, MEDICATIONS, ALLERGIES, SOCIAL AND FAMILY HISTORY in [facility] and reviewed by me personally.    ROS negative other than the symptoms noted above in the HPI.      Examination   /84 (BP Location: Right arm, Patient Position: Sitting, Cuff Size: Adult Large)   Pulse 100   Temp 98.9  F (37.2  C) (Oral)   Ht 1.765 m (5' 9.5\")   SpO2 97%   BMI 38.63 kg/m       Constitutional: Sitting comfortably, in no acute distress. Vital signs noted  Eyes: pupils equal round reactive to light and accomodation, extra ocular movements intact  Ears: external canals and TMs free of abnormalities  Nose: patent, without mucosal abnormalities  Mouth and throat: without erythema or lesions of the mucosa  Neck:  no adenopathy, trachea midline and normal to palpation, thyroid normal to palpation  Cardiovascular:  regular rate and rhythm, no murmurs, clicks, or gallops  Respiratory:  normal respiratory rate and rhythm, lungs clear to auscultation  SKIN: No jaundice/pallor/rash.   Psychiatric: mentation appears normal and affect normal/bright    A/P    ICD-10-CM    1. Acute recurrent maxillary sinusitis  J01.01 amoxicillin-clavulanate (AUGMENTIN) 875-125 MG tablet   2. Immunocompromised " (H)  D84.9    3. MS (multiple sclerosis) (H)- Dr Bolanos  G35        DISCUSSION:  Unfortunately, consistent with another sinus infection. Consider abx alternatives like Levaquin given diarrhea with Augmentin, but she strongly prefers to avoid any new antibiotics, and feels that the diarrhea she had on Augmentin was predictable and tolerable. Take 1 tablet twice daily with food for 10 days.  She does have a history of recurrent sinus infections, but had been relatively well controlled until recently. Had been referred to ENT in 2018, but never ended up having this appt. If symptoms recur again soon, would refer to ENT.    Contact me in 1 week if not significantly better, or sooner with worsening.       follow up visit: As needed    Dulce Petit PA-C  [facility]

## 2021-12-17 ENCOUNTER — OFFICE VISIT (OUTPATIENT)
Dept: FAMILY MEDICINE | Facility: CLINIC | Age: 51
End: 2021-12-17

## 2021-12-17 VITALS
RESPIRATION RATE: 20 BRPM | SYSTOLIC BLOOD PRESSURE: 118 MMHG | TEMPERATURE: 98 F | OXYGEN SATURATION: 96 % | HEART RATE: 86 BPM | DIASTOLIC BLOOD PRESSURE: 72 MMHG

## 2021-12-17 DIAGNOSIS — U07.1 INFECTION DUE TO 2019 NOVEL CORONAVIRUS: Primary | ICD-10-CM

## 2021-12-17 LAB
% GRANULOCYTES: 63.5 %
COVID-19: POSITIVE
FLUAV AG UPPER RESP QL IA.RAPID: NORMAL
FLUBV AG UPPER RESP QL IA.RAPID: NORMAL
HCT VFR BLD AUTO: 47.1 % (ref 35–47)
HEMOGLOBIN: 15.7 G/DL (ref 11.7–15.7)
LYMPHOCYTES NFR BLD AUTO: 27.2 %
MCH RBC QN AUTO: 31.2 PG (ref 26–33)
MCHC RBC AUTO-ENTMCNC: 33.3 G/DL (ref 31–36)
MCV RBC AUTO: 93.5 FL (ref 78–100)
MONOCYTES NFR BLD AUTO: 9.3 %
PLATELET COUNT - QUEST: 138 10^9/L (ref 150–375)
RBC # BLD AUTO: 5.04 10*12/L (ref 3.8–5.2)
WBC # BLD AUTO: 4.9 10*9/L (ref 4–11)

## 2021-12-17 PROCEDURE — 87804 INFLUENZA ASSAY W/OPTIC: CPT | Performed by: PHYSICIAN ASSISTANT

## 2021-12-17 PROCEDURE — 99213 OFFICE O/P EST LOW 20 MIN: CPT | Performed by: PHYSICIAN ASSISTANT

## 2021-12-17 PROCEDURE — 85025 COMPLETE CBC W/AUTO DIFF WBC: CPT | Performed by: PHYSICIAN ASSISTANT

## 2021-12-17 PROCEDURE — G2023 SPECIMEN COLLECT COVID-19: HCPCS | Performed by: PHYSICIAN ASSISTANT

## 2021-12-17 PROCEDURE — 36415 COLL VENOUS BLD VENIPUNCTURE: CPT | Performed by: PHYSICIAN ASSISTANT

## 2021-12-17 PROCEDURE — 87635 SARS-COV-2 COVID-19 AMP PRB: CPT | Performed by: PHYSICIAN ASSISTANT

## 2021-12-17 NOTE — PROGRESS NOTES
Assessment & Plan     Infection due to 2019 novel coronavirus  Pt left clinic because she was so tired to go home before labs completed  Pt has COVID  Called pt at home  Based on timeline isn't eligible for Monoclonal abs.  Vitals stable  Push fluids  APAP/ibuprofen  Pt to call her neurologist to tell them she has COVID due to Immunocompromised status  Our lab will call [facility] to see if additional testing is needed with COVID after 3 Moderna vaccines    Go to ER with any worsening sx for CXR/further eval      TRISTIAN Rowley  [facility] PHYSIC[facility]ANS    Subjective     Nursing Notes:   Jigna Montanez, [facility]  12/17/2021  2:09 PM  Addendum  Ida Gayle is here for a cough for the past few days. She was on an antibiotic for sinus. Wants to make sure that she is not getting more sick.    Questioned patient about current smoking habits.  Pt. has never smoked.  PULSE regular  My Chart: active  CLASSIFICATION OF OVERWEIGHT AND OBESITY BY BMI                        Obesity Class           BMI(kg/m2)  Underweight                                    < 18.5  Normal                                         18.5-24.9  Overweight                                     25.0-29.9  OBESITY                     I                  30.0-34.9                             II                 35.0-39.9  EXTREME OBESITY             III                >40                            Patient's  BMI There is no height or weight on file to calculate BMI.  http://hin.nhlbi.nih.gov/menuplanner/menu.cgi  Pre-visit planning  Immunizations - up to date  Colonoscopy -   Mammogram -   Asthma -   PHQ9 -    XANDER-7 -    Hearing Test -       ElizawiJigna john, [facility]  12/17/2021  2:10 PM  Signed  The patient has verbalized that it is ok to leave a detailed voice message on the patient's cell phone with results/recommendations from this visit.             Ida Gayle is a 51 year old female who presents to clinic today for the  following health issues:     HPI   Pt here for URI starting 10 days ago  Was seen by me on 11/3 - treated for sinusitis with Augmentin  Seen again by lorne on 12/7 - sx returned and treated again with Augmentin  States in the last few days productive cough and sinus pressure has returned    Some body aches  Slight fevers    OTC: APAP    Sx began 10 days ago    Ocrevus 2 weeks ago    COVID test Monday 9/14/21 -full 3rd Moderna vaccine due to Ocrevus use            Objective    /72 (BP Location: Right arm, Patient Position: Chair, Cuff Size: Adult Large)   Pulse 86   Temp 98  F (36.7  C)   Resp 20   SpO2 96%   There is no height or weight on file to calculate BMI.  Physical Exam   GENERAL: healthy, alert and no distress  EYES: Eyes grossly normal to inspection, PERRL and conjunctivae and sclerae normal  HENT: ear canals and TM's normal, nose and mouth without ulcers or lesions  NECK: no adenopathy, no asymmetry, masses, or scars and thyroid normal to palpation  RESP: lungs clear to auscultation - no rales, rhonchi or wheezes  CV: regular rate and rhythm, normal S1 S2, no S3 or S4, no murmur, click or rub, no peripheral edema and peripheral pulses strong  MS: no gross musculoskeletal defects noted, no edema    Results for orders placed or performed in visit on 12/17/21 (from the past 24 hour(s))   HEMOGRAM PLATELET DIFF (BFP)   Result Value Ref Range    WBC 4.9 4.0 - 11 10*9/L    RBC Count 5.04 3.8 - 5.2 10*12/L    Hemoglobin 15.7 11.7 - 15.7 g/dL    Hematocrit 47.1 (A) 35.0 - 47.0 %    MCV 93.5 78 - 100 fL    MCH 31.2 26 - 33 pg    MCHC 33.3 31 - 36 g/dL    Platelet Count 138 (A) 150 - 375 10^9/L    % Granulocytes 63.5 %    % Lymphocytes 27.2 %    % Monocytes 9.3 %   Influenza A and B (BFP)   Result Value Ref Range    Influenza A neg neg    Influenza B neg neg   COVID-19 (BFP)   Result Value Ref Range    COVID-19 Positive (A)

## 2021-12-17 NOTE — NURSING NOTE
Ida Gayle is here for a cough for the past few days. She was on an antibiotic for sinus. Wants to make sure that she is not getting more sick.    Questioned patient about current smoking habits.  Pt. has never smoked.  PULSE regular  My Chart: active  CLASSIFICATION OF OVERWEIGHT AND OBESITY BY BMI                        Obesity Class           BMI(kg/m2)  Underweight                                    < 18.5  Normal                                         18.5-24.9  Overweight                                     25.0-29.9  OBESITY                     I                  30.0-34.9                             II                 35.0-39.9  EXTREME OBESITY             III                >40                            Patient's  BMI There is no height or weight on file to calculate BMI.  http://hin.nhlbi.nih.gov/menuplanner/menu.cgi  Pre-visit planning  Immunizations - up to date  Colonoscopy -   Mammogram -   Asthma -   PHQ9 -    XANDER-7 -    Hearing Test -

## 2022-03-05 ENCOUNTER — HEALTH MAINTENANCE LETTER (OUTPATIENT)
Age: 52
End: 2022-03-05

## 2022-04-30 ENCOUNTER — HEALTH MAINTENANCE LETTER (OUTPATIENT)
Age: 52
End: 2022-04-30

## 2022-07-12 ENCOUNTER — OFFICE VISIT (OUTPATIENT)
Dept: FAMILY MEDICINE | Facility: CLINIC | Age: 52
End: 2022-07-12

## 2022-07-12 VITALS
BODY MASS INDEX: 40.09 KG/M2 | WEIGHT: 280 LBS | HEIGHT: 70 IN | DIASTOLIC BLOOD PRESSURE: 76 MMHG | TEMPERATURE: 98.1 F | HEART RATE: 90 BPM | SYSTOLIC BLOOD PRESSURE: 124 MMHG | OXYGEN SATURATION: 99 %

## 2022-07-12 DIAGNOSIS — E66.9 OBESITY (BMI 30-39.9): ICD-10-CM

## 2022-07-12 DIAGNOSIS — F41.9 ANXIETY: ICD-10-CM

## 2022-07-12 DIAGNOSIS — I49.3 PVC'S (PREMATURE VENTRICULAR CONTRACTIONS): ICD-10-CM

## 2022-07-12 DIAGNOSIS — E66.01 MORBID OBESITY (H): ICD-10-CM

## 2022-07-12 DIAGNOSIS — Z80.0 FAMILY HISTORY OF PANCREATIC CANCER: ICD-10-CM

## 2022-07-12 DIAGNOSIS — N95.1 MENOPAUSAL SYNDROME (HOT FLASHES): ICD-10-CM

## 2022-07-12 DIAGNOSIS — E78.00 PURE HYPERCHOLESTEROLEMIA: ICD-10-CM

## 2022-07-12 DIAGNOSIS — Z12.31 ENCOUNTER FOR SCREENING MAMMOGRAM FOR BREAST CANCER: ICD-10-CM

## 2022-07-12 DIAGNOSIS — R07.89 OTHER CHEST PAIN: Primary | ICD-10-CM

## 2022-07-12 DIAGNOSIS — Z12.11 SPECIAL SCREENING FOR MALIGNANT NEOPLASMS, COLON: ICD-10-CM

## 2022-07-12 PROBLEM — E55.9 VITAMIN D INSUFFICIENCY: Status: ACTIVE | Noted: 2022-07-12

## 2022-07-12 LAB
ALBUMIN SERPL-MCNC: 4.4 G/DL (ref 3.6–5.1)
ALBUMIN/GLOB SERPL: 1.8 {RATIO} (ref 1–2.5)
ALP SERPL-CCNC: 59 U/L (ref 33–130)
ALT 1742-6: 8 U/L (ref 0–32)
AST 1920-8: 10 U/L (ref 0–35)
BILIRUB SERPL-MCNC: 0.8 MG/DL (ref 0.2–1.2)
BUN SERPL-MCNC: 16 MG/DL (ref 7–25)
BUN/CREATININE RATIO: 20 (ref 6–22)
CALCIUM SERPL-MCNC: 9.7 MG/DL (ref 8.6–10.3)
CHLORIDE SERPLBLD-SCNC: 101.9 MMOL/L (ref 98–110)
CHOLEST SERPL-MCNC: 192 MG/DL (ref 0–199)
CHOLEST/HDLC SERPL: 2 {RATIO} (ref 0–5)
CO2 SERPL-SCNC: 27.9 MMOL/L (ref 20–32)
CREAT SERPL-MCNC: 0.8 MG/DL (ref 0.6–1.3)
ERYTHROCYTE [DISTWIDTH] IN BLOOD BY AUTOMATED COUNT: 13.9 %
GLOBULIN, CALCULATED - QUEST: 2.5 (ref 1.9–3.7)
GLUCOSE SERPL-MCNC: 101 MG/DL (ref 60–99)
HCT VFR BLD AUTO: 40.6 % (ref 35–47)
HDLC SERPL-MCNC: 77 MG/DL (ref 40–150)
HEMOGLOBIN: 13.4 G/DL (ref 11.7–15.7)
LDLC SERPL CALC-MCNC: 97 MG/DL (ref 0–130)
MCH RBC QN AUTO: 31.5 PG (ref 26–33)
MCHC RBC AUTO-ENTMCNC: 33 G/DL (ref 31–36)
MCV RBC AUTO: 95.5 FL (ref 78–100)
PLATELET COUNT - QUEST: 271 10^9/L (ref 150–375)
POTASSIUM SERPL-SCNC: 5.08 MMOL/L (ref 3.5–5.3)
PROT SERPL-MCNC: 6.9 G/DL (ref 6.1–8.1)
RBC # BLD AUTO: 4.25 10*12/L (ref 3.8–5.2)
SODIUM SERPL-SCNC: 136.2 MMOL/L (ref 135–146)
TRIGL SERPL-MCNC: 91 MG/DL (ref 0–149)
WBC # BLD AUTO: 7.2 10*9/L (ref 4–11)

## 2022-07-12 PROCEDURE — 84439 ASSAY OF FREE THYROXINE: CPT | Mod: 90 | Performed by: PHYSICIAN ASSISTANT

## 2022-07-12 PROCEDURE — 90714 TD VACC NO PRESV 7 YRS+ IM: CPT | Performed by: PHYSICIAN ASSISTANT

## 2022-07-12 PROCEDURE — 90471 IMMUNIZATION ADMIN: CPT | Performed by: PHYSICIAN ASSISTANT

## 2022-07-12 PROCEDURE — 36415 COLL VENOUS BLD VENIPUNCTURE: CPT | Performed by: PHYSICIAN ASSISTANT

## 2022-07-12 PROCEDURE — 93000 ELECTROCARDIOGRAM COMPLETE: CPT | Performed by: PHYSICIAN ASSISTANT

## 2022-07-12 PROCEDURE — 80053 COMPREHEN METABOLIC PANEL: CPT | Performed by: PHYSICIAN ASSISTANT

## 2022-07-12 PROCEDURE — 84443 ASSAY THYROID STIM HORMONE: CPT | Mod: 90 | Performed by: PHYSICIAN ASSISTANT

## 2022-07-12 PROCEDURE — 99214 OFFICE O/P EST MOD 30 MIN: CPT | Mod: 25 | Performed by: PHYSICIAN ASSISTANT

## 2022-07-12 PROCEDURE — 80061 LIPID PANEL: CPT | Performed by: PHYSICIAN ASSISTANT

## 2022-07-12 PROCEDURE — 85027 COMPLETE CBC AUTOMATED: CPT | Performed by: PHYSICIAN ASSISTANT

## 2022-07-12 NOTE — PROGRESS NOTES
Assessment & Plan     Other chest pain  New -stress echo and consult ordered  911/ER with any severe CP  - T4 FREE ([facility])  - TSH ([facility])  - Comprehensive Metobolic Panel ([facility])  - Adult Cardiology Eval  Referral  - Echocardiogram Exercise Stress  - EKG 12-lead complete w/read - Clinics    PVC's (premature ventricular contractions)    - Hemogram with Platelets ([facility])  - T4 FREE ([facility])  - TSH ([facility])  - Comprehensive Metobolic Panel ([facility])  - Adult Cardiology Eval  Referral  - Echocardiogram Exercise Stress  - EKG 12-lead complete w/read - Clinics    Obesity (BMI 30-39.9)    - VENOUS COLLECTION  - Lipid Panel ([facility])  - Comprehensive Metobolic Panel ([facility])    Anxiety      Pure hypercholesterolemia    - VENOUS COLLECTION  - Lipid Panel ([facility])    Encounter for screening mammogram for breast cancer    - Mammo Screening digital (bilat)  - Radiology Referral    Special screening for malignant neoplasms, colon    - Colonscopy Screening  Referral    Morbid obesity (H)      Family history of pancreatic cancer    - Other Specialty Referral    Menopausal syndrome (hot flashes)  Will see OB        TRISTIAN Rowley  [facility]    Subjective     Nursing Notes:   Liss Riley CMA  7/12/2022  9:00 AM  Signed  Chief Complaint   Patient presents with     Physical     Fasting today      Palpitations     Heart racing at times up to 200 BPM     Menopausal Sx     Discuss menopause     Pre-visit Screening:  Immunizations:  not up to date - TD today, shingrix at pharmacy  Colonoscopy:  is due and ordered today  Mammogram: is due and ordered today  Asthma Action Test/Plan:  NA  PHQ9:  NA  GAD7:  NA  Questioned patient about current smoking habits Pt. has never smoked.  Ok to leave detailed message on voice mail for today's visit only Yes, phone # [phone]              Ida Gayle is a 52 year old female who presents to clinic today for the following health issues:     HPI  "    Was scheduled for CPE    1. Pancreatic cancer and liver cancer in father - just passed away    PGF - Pancreatic cancer and triple bypass  PAunt - Pancreatic cancer  Father- Pancreatic cancer  PAunt - pacemaker and defibrillator    Ashkenazi Mor[employer] -father's family    PGM - lung cancer    Dad had atrial fibrillation  Father also had AAA    No genetic testing has been done.     2. 2019 workup for palpitations  In  The last year has started to get very rapid heart rate (180-200) 2-3 min  Yesterday walking - 2 miles - HR to 150s  Happening once a week.     3. Menopause concerns - sees OBGYN            Objective    /76 (BP Location: Right arm, Patient Position: Sitting, Cuff Size: Adult Large)   Pulse 90   Temp 98.1  F (36.7  C) (Temporal)   Ht 1.765 m (5' 9.5\")   Wt 127 kg (280 lb)   LMP 08/02/2021 (Approximate)   SpO2 99%   BMI 40.76 kg/m    Body mass index is 40.76 kg/m .  Physical Exam   GENERAL: healthy, alert and no distress  EYES: Eyes grossly normal to inspection, PERRL and conjunctivae and sclerae normal  HENT: ear canals and TM's normal, nose and mouth without ulcers or lesions  NECK: no adenopathy, no asymmetry, masses, or scars and thyroid normal to palpation  RESP: lungs clear to auscultation - no rales, rhonchi or wheezes  CV: regular rate and rhythm, normal S1 S2, no S3 or S4, no murmur, click or rub, no peripheral edema and peripheral pulses strong  Abdomen: Normal bowel sounds. Soft, no masses appreciated, no organomegaly. Non-tender. No guarding, no rebound tenderness.   MS: no gross musculoskeletal defects noted, no edema            "

## 2022-07-13 LAB
T4, FREE, NON-DIALYSIS - QUEST: 1.1 NG/DL (ref 0.8–1.8)
TSH SERPL-ACNC: 1.92 MIU/L

## 2022-07-21 ENCOUNTER — TRANSFERRED RECORDS (OUTPATIENT)
Dept: FAMILY MEDICINE | Facility: CLINIC | Age: 52
End: 2022-07-21

## 2022-08-01 ENCOUNTER — TRANSFERRED RECORDS (OUTPATIENT)
Dept: FAMILY MEDICINE | Facility: CLINIC | Age: 52
End: 2022-08-01

## 2022-08-31 ENCOUNTER — OFFICE VISIT (OUTPATIENT)
Dept: FAMILY MEDICINE | Facility: CLINIC | Age: 52
End: 2022-08-31

## 2022-08-31 VITALS
TEMPERATURE: 98.2 F | DIASTOLIC BLOOD PRESSURE: 80 MMHG | OXYGEN SATURATION: 99 % | HEART RATE: 74 BPM | SYSTOLIC BLOOD PRESSURE: 124 MMHG | WEIGHT: 285 LBS | BODY MASS INDEX: 41.48 KG/M2

## 2022-08-31 DIAGNOSIS — Z01.818 PRE-OP EXAM: ICD-10-CM

## 2022-08-31 DIAGNOSIS — N81.2 CERVICAL PROLAPSE: Primary | ICD-10-CM

## 2022-08-31 LAB
% GRANULOCYTES: 52.6 %
HCT VFR BLD AUTO: 42.6 % (ref 35–47)
HEMOGLOBIN: 13.9 G/DL (ref 11.7–15.7)
LYMPHOCYTES NFR BLD AUTO: 37.4 %
MCH RBC QN AUTO: 30.9 PG (ref 26–33)
MCHC RBC AUTO-ENTMCNC: 32.6 G/DL (ref 31–36)
MCV RBC AUTO: 94.7 FL (ref 78–100)
MONOCYTES NFR BLD AUTO: 10 %
PLATELET COUNT - QUEST: 182 10^9/L (ref 150–375)
RBC # BLD AUTO: 4.5 10*12/L (ref 3.8–5.2)
WBC # BLD AUTO: 7.8 10*9/L (ref 4–11)

## 2022-08-31 PROCEDURE — 85025 COMPLETE CBC W/AUTO DIFF WBC: CPT | Performed by: PHYSICIAN ASSISTANT

## 2022-08-31 PROCEDURE — 99214 OFFICE O/P EST MOD 30 MIN: CPT | Performed by: PHYSICIAN ASSISTANT

## 2022-08-31 PROCEDURE — 36415 COLL VENOUS BLD VENIPUNCTURE: CPT | Performed by: PHYSICIAN ASSISTANT

## 2022-08-31 NOTE — PROGRESS NOTES
[facility]  [address]  [address]  [facility] [address]  Phone: [phone]  Fax: [phone]  Primary Provider: Karey King  Pre-op Performing Provider: CUBA MAYS      PREOPERATIVE EVALUATION:  Today's date: 8/31/2022    Ida Gayle is a 52 year old female who presents for a preoperative evaluation.    Surgical Information:  Surgery/Procedure: Removal of cervix due to prolapse  Surgery Location: [facility]  Surgeon: Dr. Riojas  Surgery Date: 9/9/22  Time of Surgery: 0845  Where patient plans to recover: At home with family  Fax number for surgical facility:     Type of Anesthesia Anticipated: to be determined    Assessment & Plan     The proposed surgical procedure is considered INTERMEDIATE risk.    Cervical prolapse  Proceed with surgery at surgeon's discretion.    - VENOUS COLLECTION  - HEMOGRAM PLATELET DIFF ([facility])    Pre-op exam  - VENOUS COLLECTION  - HEMOGRAM PLATELET DIFF ([facility])          Risks and Recommendations:  The patient has the following additional risks and recommendations for perioperative complications:   - Morbid obesity (BMI >40)    Take all medications after surgery    RECOMMENDATION:  APPROVAL GIVEN to proceed with proposed procedure, without further diagnostic evaluation.        Subjective     HPI related to upcoming procedure: Prolapsed cervix after hysterectomy    1. No - Have you ever had a heart attack or stroke?  2. No - Have you ever had surgery on your heart or blood vessels, such as a stent, coronary (heart) bypass, or surgery on an artery in the head, neck, heart, or legs?  3. No - Do you have chest pain when you are physically active?  4. No - Do you have a history of heart failure?  5. No - Do you currently have a cold, bronchitis, or symptoms of other respiratory (head and chest) infections?  6. No - Do you have a cough, shortness of breath, or wheezing?  7. No - Do you or anyone in your family have a history  of blood clots?  8. No - Do you or anyone in your family have a serious bleeding problem, such as long-lasting bleeding after surgeries or cuts?  9. No - Have you ever had anemia or been told to take iron pills?  10. No - Have you had any abnormal blood loss such as black, tarry or bloody stools, or abnormal vaginal bleeding?  11. No - Have you ever had a blood transfusion?  12. Yes - Are you willing to have a blood transfusion if it is medically needed before, during, or after your surgery?  13. No - Have you or anyone in your family ever had problems with anesthesia (sedation for surgery)?  14. No - Do you have sleep apnea, excessive snoring, or daytime drowsiness?   15. No - Do you have any artifical heart valves or other implanted medical devices, such as a pacemaker, defibrillator, or continuous glucose monitor?  16. No - Do you have any artifical joints?  17. No - Are you allergic to latex?      Health Care Directive:  Patient does not have a Health Care Directive or Living Will: Discussed advance care planning with patient; however, patient declined at this time.    Preoperative Review of :   reviewed - controlled substances prescribed by other outside provider(s).      Status of Chronic Conditions:  See problem list for active medical problems.  Problems all longstanding and stable, except as noted/documented.  See ROS for pertinent symptoms related to these conditions.      Review of Systems  CONSTITUTIONAL: NEGATIVE for fever, chills, change in weight  INTEGUMENTARY/SKIN: NEGATIVE for worrisome rashes, moles or lesions  EYES: NEGATIVE for vision changes or irritation  ENT/MOUTH: NEGATIVE for ear, mouth and throat problems  RESP: NEGATIVE for significant cough or SOB  CV: NEGATIVE for chest pain, palpitations or peripheral edema  GI: NEGATIVE for nausea, abdominal pain, heartburn, or change in bowel habits  MUSCULOSKELETAL: NEGATIVE for significant arthralgias or myalgia  NEURO: NEGATIVE for weakness,  dizziness or paresthesias  ENDOCRINE: NEGATIVE for temperature intolerance, skin/hair changes  HEME: NEGATIVE for bleeding problems  PSYCHIATRIC: NEGATIVE for changes in mood or affect    Patient Active Problem List    Diagnosis Date Noted     Vitamin D insufficiency 07/12/2022     Priority: Medium     Pure hypercholesterolemia 07/12/2022     Priority: Medium     PVC's (premature ventricular contractions) 07/12/2022     Priority: Medium     Morbid obesity (H) 07/12/2022     Priority: Medium     Obesity (BMI 30-39.9) 08/05/2021     Priority: Medium     Anxiety 08/05/2021     Priority: Medium     Immunocompromised (H) 06/27/2018     Priority: Medium     MCI (mild cognitive impairment) 04/25/2017     Priority: Medium     neuropsych testing with very mild changes likely related to MS and previous learning disorders-does not affect daily functioning to any significant degree by testing       MS (multiple sclerosis) (H)- Dr Bolanos 04/15/2017     Priority: Medium     Neurology [facility]       ACP (advance care planning) 05/02/2014     Priority: Medium       Advance Care Planning 2/16/2016: ACP Review of Chart / Resources Provided:  Reviewed chart for advance care plan.  Ida Gayle has no plan or code status on file. Discussed available resources and provided with information. Confirmed code status reflects current choices pending further ACP discussions.  Confirmed/documented legally designated decision makers.  Added by Emelina King               Major depressive disorder, recurrent episode, in full remission (H) 05/02/2014     Priority: Medium     Health Care Home 09/11/2013     Priority: Medium     State Tier Level:  0  Status:  n/a  Care Coordinator:      See Letters for H Care Plan            Past Medical History:   Diagnosis Date     Duplicated ureter, left      Multiple sclerosis (H) 2017     Seizure (H)     As baby     Past Surgical History:   Procedure Laterality Date     CYSTECTOMY OVARIAN BENIGN  2010      ZZC REIMPLANT URETER,DUPLICATED URETER  1974     Current Outpatient Medications   Medication Sig Dispense Refill     diazepam (VALIUM) 5 MG tablet TAKE 1-2 TABLETS BY MOUTH ONCE DAILY AS NEEDED FOR ANXIETY       Ocrelizumab (OCREVUS IV)        rizatriptan (MAXALT) 10 MG tablet TAKE 1 TABLET BY MOUTH AT ONSET OF MIGRAINE MAY REPEAT 1 TIME IN 24 HOURS. NOT TO EXCEED 2 IN 24 HOURS       tiZANidine (ZANAFLEX) 4 MG tablet Take 1 tablet (4 mg) by mouth At Bedtime 90 tablet        Allergies   Allergen Reactions     Azolen Tincture Hives     diflucan     Flagyl [Metronidazole] Hives     Sulfa Drugs      Clavulanic Acid Diarrhea        Social History     Tobacco Use     Smoking status: Never Smoker     Smokeless tobacco: Never Used   Substance Use Topics     Alcohol use: Yes     Comment: 1x a month     Family History   Problem Relation Age of Onset     Prostate Cancer Father      Cancer Maternal Grandmother         skin cancer     Multiple Sclerosis Brother      Multiple Sclerosis Cousin      Diabetes No family hx of      C.A.D. No family hx of      Breast Cancer No family hx of      Cancer - colorectal No family hx of      History   Drug Use No         Objective     /80 (BP Location: Right arm, Patient Position: Sitting, Cuff Size: Adult Large)   Pulse 74   Temp 98.2  F (36.8  C) (Oral)   Wt 129.3 kg (285 lb)   LMP 08/02/2021 (Approximate)   SpO2 99%   BMI 41.48 kg/m      Physical Exam    GENERAL APPEARANCE: healthy, alert and no distress     EYES: EOMI, PERRL     HENT: ear canals and TM's normal and nose and mouth without ulcers or lesions     NECK: no adenopathy, no asymmetry, masses, or scars and thyroid normal to palpation     RESP: lungs clear to auscultation - no rales, rhonchi or wheezes     CV: regular rates and rhythm, normal S1 S2, no S3 or S4 and no murmur, click or rub     ABDOMEN:  soft, nontender, no HSM or masses and bowel sounds normal     MS: extremities normal- no gross deformities noted,  no evidence of inflammation in joints, FROM in all extremities.     SKIN: no suspicious lesions or rashes     NEURO: Normal strength and tone, sensory exam grossly normal, mentation intact and speech normal     PSYCH: mentation appears normal. and affect normal/bright     LYMPHATICS: No cervical adenopathy    Recent Labs   Lab Test 07/12/22  1145 07/12/22  0000 12/17/21  0000   HGB  --  13.4 15.7   PLT  --  271 138*   .2  --   --    POTASSIUM 5.08  --   --    CR 0.80  --   --         Diagnostics:  Recent Results (from the past 24 hour(s))   HEMOGRAM PLATELET DIFF ([facility])    Collection Time: 08/31/22 12:00 AM   Result Value Ref Range    WBC 7.8 4.0 - 11 10*9/L    RBC Count 4.50 3.8 - 5.2 10*12/L    Hemoglobin 13.9 11.7 - 15.7 g/dL    Hematocrit 42.6 35.0 - 47.0 %    MCV 94.7 78 - 100 fL    MCH 30.9 26 - 33 pg    MCHC 32.6 31 - 36 g/dL    Platelet Count 182 150 - 375 10^9/L    % Granulocytes 52.6 %    % Lymphocytes 37.4 %    % Monocytes 10.0 %      No EKG required, no history of coronary heart disease, significant arrhythmia, peripheral arterial disease or other structural heart disease.    Revised Cardiac Risk Index (RCRI):  The patient has the following serious cardiovascular risks for perioperative complications:   - No serious cardiac risks = 0 points     RCRI Interpretation: 0 points: Class I (very low risk - 0.4% complication rate)           Signed Electronically by: Octavio Muller PA-C  Copy of this evaluation report is provided to requesting physician.

## 2022-09-09 ENCOUNTER — LAB REQUISITION (OUTPATIENT)
Dept: LAB | Facility: CLINIC | Age: 52
End: 2022-09-09
Payer: COMMERCIAL

## 2022-09-09 PROCEDURE — 88305 TISSUE EXAM BY PATHOLOGIST: CPT | Mod: 26 | Performed by: PATHOLOGY

## 2022-09-09 PROCEDURE — 88307 TISSUE EXAM BY PATHOLOGIST: CPT | Mod: TC,ORL | Performed by: OBSTETRICS & GYNECOLOGY

## 2022-11-20 ENCOUNTER — HEALTH MAINTENANCE LETTER (OUTPATIENT)
Age: 52
End: 2022-11-20

## 2022-12-06 ENCOUNTER — OFFICE VISIT (OUTPATIENT)
Dept: FAMILY MEDICINE | Facility: CLINIC | Age: 52
End: 2022-12-06

## 2022-12-06 VITALS
DIASTOLIC BLOOD PRESSURE: 84 MMHG | HEART RATE: 72 BPM | OXYGEN SATURATION: 95 % | SYSTOLIC BLOOD PRESSURE: 126 MMHG | TEMPERATURE: 97.9 F

## 2022-12-06 DIAGNOSIS — R05.1 ACUTE COUGH: ICD-10-CM

## 2022-12-06 DIAGNOSIS — G35 MS (MULTIPLE SCLEROSIS) (H): ICD-10-CM

## 2022-12-06 DIAGNOSIS — J01.90 ACUTE SINUSITIS WITH SYMPTOMS > 10 DAYS: Primary | ICD-10-CM

## 2022-12-06 DIAGNOSIS — B37.31 YEAST INFECTION OF THE VAGINA: ICD-10-CM

## 2022-12-06 DIAGNOSIS — D84.9 IMMUNOSUPPRESSION (H): ICD-10-CM

## 2022-12-06 LAB
% GRANULOCYTES: 64.6 %
COVID-19: POSITIVE
FLUAV AG UPPER RESP QL IA.RAPID: NORMAL
FLUBV AG UPPER RESP QL IA.RAPID: NORMAL
HCT VFR BLD AUTO: 44.9 % (ref 35–47)
HEMOGLOBIN: 14.9 G/DL (ref 11.7–15.7)
LYMPHOCYTES NFR BLD AUTO: 22.5 %
MCH RBC QN AUTO: 31.1 PG (ref 26–33)
MCHC RBC AUTO-ENTMCNC: 32.5 G/DL (ref 31–36)
MCV RBC AUTO: 95.7 FL (ref 78–100)
MONOCYTES NFR BLD AUTO: 12.9 %
PLATELET COUNT - QUEST: 178 10^9/L (ref 150–375)
RBC # BLD AUTO: 4.69 10*12/L (ref 3.8–5.2)
WBC # BLD AUTO: 6.7 10*9/L (ref 4–11)

## 2022-12-06 PROCEDURE — 87635 SARS-COV-2 COVID-19 AMP PRB: CPT | Performed by: PHYSICIAN ASSISTANT

## 2022-12-06 PROCEDURE — 36415 COLL VENOUS BLD VENIPUNCTURE: CPT | Performed by: PHYSICIAN ASSISTANT

## 2022-12-06 PROCEDURE — 85025 COMPLETE CBC W/AUTO DIFF WBC: CPT | Performed by: PHYSICIAN ASSISTANT

## 2022-12-06 PROCEDURE — 87804 INFLUENZA ASSAY W/OPTIC: CPT | Performed by: PHYSICIAN ASSISTANT

## 2022-12-06 PROCEDURE — G2023 SPECIMEN COLLECT COVID-19: HCPCS | Performed by: PHYSICIAN ASSISTANT

## 2022-12-06 PROCEDURE — 99213 OFFICE O/P EST LOW 20 MIN: CPT | Performed by: PHYSICIAN ASSISTANT

## 2022-12-06 RX ORDER — FLUCONAZOLE 150 MG/1
150 TABLET ORAL ONCE
Qty: 1 TABLET | Refills: 0 | Status: SHIPPED | OUTPATIENT
Start: 2022-12-06 | End: 2022-12-06

## 2022-12-06 NOTE — NURSING NOTE
Chief Complaint   Patient presents with     URI     Symptoms started 7 days ago , had fever and URI symptoms, now having chest tightness and SOB, productive cough, sinus congestion, right ear pain, neg at home covid test     Pre-visit Screening:  Immunizations:  up to date  Colonoscopy:  is up to date  Mammogram: is up to date  Asthma Action Test/Plan:  NA  PHQ9:  NA  GAD7:  NA  Questioned patient about current smoking habits Pt. has never smoked.  Ok to leave detailed message on voice mail for today's visit only Yes, phone # [phone]

## 2022-12-06 NOTE — PROGRESS NOTES
Assessment & Plan     Acute sinusitis with symptoms > 10 days  Start Augmentin  Probiotic encouraged   - amoxicillin-clavulanate (AUGMENTIN) 875-125 MG tablet  Dispense: 20 tablet; Refill: 0    Immunosuppression (H)    - HEMOGRAM PLATELET DIFF ([facility])  - VENOUS COLLECTION  - Influenza A and B ([facility])  - COVID-19 ([facility])  - VT SPECIMEN COLLECT COVID-19    MS (multiple sclerosis) (H)- Dr Bolanos      Acute cough    - HEMOGRAM PLATELET DIFF ([facility])  - VENOUS COLLECTION  - Influenza A and B ([facility])  - COVID-19 ([facility])  - VT SPECIMEN COLLECT COVID-19    Yeast infection of the vagina  Gets yeast inf. With abx  Take Diflucan prn  - fluconazole (DIFLUCAN) 150 MG tablet  Dispense: 1 tablet; Refill: 0    Follow-up if not improving with treatment    I have advised to this patient that I will be leaving [facility] end of the year. I have suggested  he/she est. Care with one of our providers in follow up.         TRISTIAN Rowley  [facility]    Subjective     Nursing Notes:   Liss Riley CMA  12/6/2022  9:22 AM  Signed  Chief Complaint   Patient presents with     URI     Symptoms started 7 days ago , had fever and URI symptoms, now having chest tightness and SOB, productive cough, sinus congestion, right ear pain, neg at home covid test     Pre-visit Screening:  Immunizations:  up to date  Colonoscopy:  is up to date  Mammogram: is up to date  Asthma Action Test/Plan:  NA  PHQ9:  NA  GAD7:  NA  Questioned patient about current smoking habits Pt. has never smoked.  Ok to leave detailed message on voice mail for today's visit only Yes, phone # [phone]              Ida Gayle is a 52 year old female who presents to clinic today for the following health issues:     HPI       Headache, congestion, fever started last Wednesday  No diarrhea, vomiting.    Now coughing and chest congestion, right ear pain.     AlkaSeltzer, NyQuil    Pt immunosuppressed due to MS treatment               Objective    /84 (BP Location: Left arm, Patient Position: Sitting, Cuff Size: Adult Large)   Pulse 72   Temp 97.9  F (36.6  C) (Temporal)   LMP 08/02/2021 (Approximate)   SpO2 95%   There is no height or weight on file to calculate BMI.  Physical Exam   GENERAL: healthy, alert and no distress  EYES: Eyes grossly normal to inspection, PERRL and conjunctivae and sclerae normal  HENT: ear canals and TM's normal, nose and mouth without ulcers or lesions  NECK: no adenopathy, no asymmetry, masses, or scars and thyroid normal to palpation  RESP: lungs clear to auscultation - no rales, rhonchi or wheezes  CV: regular rate and rhythm, normal S1 S2, no S3 or S4, no murmur, click or rub, no peripheral edema and peripheral pulses strong  MS: no gross musculoskeletal defects noted, no edema

## 2023-05-01 ENCOUNTER — OFFICE VISIT (OUTPATIENT)
Dept: FAMILY MEDICINE | Facility: CLINIC | Age: 53
End: 2023-05-01

## 2023-05-01 VITALS
WEIGHT: 293 LBS | TEMPERATURE: 98.3 F | OXYGEN SATURATION: 99 % | HEIGHT: 70 IN | DIASTOLIC BLOOD PRESSURE: 82 MMHG | HEART RATE: 79 BPM | BODY MASS INDEX: 41.95 KG/M2 | SYSTOLIC BLOOD PRESSURE: 126 MMHG

## 2023-05-01 DIAGNOSIS — G56.01 CARPAL TUNNEL SYNDROME OF RIGHT WRIST: ICD-10-CM

## 2023-05-01 DIAGNOSIS — Z01.818 PRE-OP EXAM: Primary | ICD-10-CM

## 2023-05-01 PROCEDURE — 99214 OFFICE O/P EST MOD 30 MIN: CPT | Performed by: PHYSICIAN ASSISTANT

## 2023-05-01 NOTE — PROGRESS NOTES
[facility] PHYSICIANS  [address]  [address]  [facility] [address]  Phone: [phone]  Fax: [phone]  Primary Provider: Cesar Bland  Pre-op Performing Provider: CUBA MAYS      PREOPERATIVE EVALUATION:  Today's date: 2023    Ida Gayle is a 52 year old female who presents for a preoperative evaluation. ( 72)    Surgical Information:  Surgery/Procedure: Right wrist carpal tunnel release   Surgery Location: [facility]   Surgeon: Dr. Jernigan  Surgery Date: 23  Time of Surgery: AM  Where patient plans to recover: At home with family  Fax number for surgical facility: [phone]    Assessment & Plan     The proposed surgical procedure is considered INTERMEDIATE risk.    Pre-op exam  Proceed with surgery at surgeon's discretion.      Carpal tunnel syndrome of right wrist         - No identified additional risk factors other than previously addressed    Antiplatelet or Anticoagulation Medication Instructions:  Not on any antiplatelet/anticoagulant    Additional Medication Instructions:  hold all meds until after surgery    RECOMMENDATION:  APPROVAL GIVEN to proceed with proposed procedure, without further diagnostic evaluation.        Subjective     HPI related to upcoming procedure: R carpal tunnel    1. No - Have you ever had a heart attack or stroke?  2. No - Have you ever had surgery on your heart or blood vessels, such as a stent, coronary (heart) bypass, or surgery on an artery in the head, neck, heart, or legs?  3. No - Do you have chest pain when you are physically active?  4. No - Do you have a history of heart failure?  5. No - Do you currently have a cold, bronchitis, or symptoms of other respiratory (head and chest) infections?  6. No - Do you have a cough, shortness of breath, or wheezing?  7. No - Do you or anyone in your family have a history of blood clots?  8. No - Do you or anyone in your family have a serious bleeding  problem, such as long-lasting bleeding after surgeries or cuts?  9. No - Have you ever had anemia or been told to take iron pills?  10. No - Have you had any abnormal blood loss such as black, tarry or bloody stools, or abnormal vaginal bleeding?  11. No - Have you ever had a blood transfusion?  12. Yes - Are you willing to have a blood transfusion if it is medically needed before, during, or after your surgery?  13. No - Have you or anyone in your family ever had problems with anesthesia (sedation for surgery)?  14. No - Do you have sleep apnea, excessive snoring, or daytime drowsiness?   15. No - Do you have any artifical heart valves or other implanted medical devices, such as a pacemaker, defibrillator, or continuous glucose monitor?  16. No - Do you have any artifical joints?  17. No - Are you allergic to latex?      Health Care Directive:  Patient does not have a Health Care Directive or Living Will: Discussed advance care planning with patient; information given to patient to review.    Preoperative Review of :   reviewed - controlled substances reflected in medication list.      Status of Chronic Conditions:  See problem list for active medical problems.  Problems all longstanding and stable, except as noted/documented.  See ROS for pertinent symptoms related to these conditions.      Review of Systems  CONSTITUTIONAL: NEGATIVE for fever, chills, change in weight  INTEGUMENTARY/SKIN: NEGATIVE for worrisome rashes, moles or lesions  EYES: NEGATIVE for vision changes or irritation  ENT/MOUTH: NEGATIVE for ear, mouth and throat problems  RESP: NEGATIVE for significant cough or SOB  CV: NEGATIVE for chest pain, palpitations or peripheral edema  GI: NEGATIVE for nausea, abdominal pain, heartburn, or change in bowel habits  : NEGATIVE for frequency, dysuria, or hematuria  ENDOCRINE: NEGATIVE for temperature intolerance, skin/hair changes  HEME: NEGATIVE for bleeding problems  PSYCHIATRIC: NEGATIVE for  changes in mood or affect    Patient Active Problem List    Diagnosis Date Noted     Vitamin D insufficiency 07/12/2022     Priority: Medium     Pure hypercholesterolemia 07/12/2022     Priority: Medium     PVC's (premature ventricular contractions) 07/12/2022     Priority: Medium     Morbid obesity (H) 07/12/2022     Priority: Medium     Obesity (BMI 30-39.9) 08/05/2021     Priority: Medium     Anxiety 08/05/2021     Priority: Medium     Immunocompromised (H) 06/27/2018     Priority: Medium     MCI (mild cognitive impairment) 04/25/2017     Priority: Medium     neuropsych testing with very mild changes likely related to MS and previous learning disorders-does not affect daily functioning to any significant degree by testing       MS (multiple sclerosis) (H)- Dr Bolanos 04/15/2017     Priority: Medium     Neurology [facility]       ACP (advance care planning) 05/02/2014     Priority: Medium       Advance Care Planning 2/16/2016: ACP Review of Chart / Resources Provided:  Reviewed chart for advance care plan.  Ida KAELA Gayle has no plan or code status on file. Discussed available resources and provided with information. Confirmed code status reflects current choices pending further ACP discussions.  Confirmed/documented legally designated decision makers.  Added by Emelina King               Major depressive disorder, recurrent episode, in full remission (H) 05/02/2014     Priority: Medium     Health Care Home 09/11/2013     Priority: Medium     State Tier Level:  0  Status:  n/a  Care Coordinator:      See Letters for H Care Plan            Past Medical History:   Diagnosis Date     Duplicated ureter, left      Multiple sclerosis (H) 2017     Seizure (H)     As baby     Past Surgical History:   Procedure Laterality Date     CYSTECTOMY OVARIAN BENIGN  2010     [facility] REIMPLANT URETER,DUPLICATED URETER  1974     Current Outpatient Medications   Medication Sig Dispense Refill     diazepam (VALIUM) 5 MG tablet TAKE 1-2  "TABLETS BY MOUTH ONCE DAILY AS NEEDED FOR ANXIETY       Ocrelizumab (OCREVUS IV)        rizatriptan (MAXALT) 10 MG tablet TAKE 1 TABLET BY MOUTH AT ONSET OF MIGRAINE MAY REPEAT 1 TIME IN 24 HOURS. NOT TO EXCEED 2 IN 24 HOURS       tiZANidine (ZANAFLEX) 4 MG tablet Take 1 tablet (4 mg) by mouth At Bedtime 90 tablet        Allergies   Allergen Reactions     Flagyl [Metronidazole] Hives     Miconazole Nitrate Hives     diflucan     Sulfa Antibiotics      Clavulanic Acid Diarrhea        Social History     Tobacco Use     Smoking status: Never     Passive exposure: Never     Smokeless tobacco: Never   Vaping Use     Vaping status: Not on file   Substance Use Topics     Alcohol use: Yes     Comment: 1x a month       History   Drug Use No         Objective     /82 (BP Location: Left arm, Patient Position: Sitting, Cuff Size: Adult Large)   Pulse 79   Temp 98.3  F (36.8  C) (Temporal)   Ht 1.765 m (5' 9.5\")   Wt 133.8 kg (295 lb)   LMP 08/02/2021 (Approximate)   SpO2 99%   BMI 42.94 kg/m      Physical Exam    GENERAL APPEARANCE: healthy, alert and no distress     EYES: EOMI, PERRL     HENT: ear canals and TM's normal and nose and mouth without ulcers or lesions     NECK: no adenopathy, no asymmetry, masses, or scars and thyroid normal to palpation     RESP: lungs clear to auscultation - no rales, rhonchi or wheezes     CV: regular rates and rhythm, normal S1 S2, no S3 or S4 and no murmur, click or rub     ABDOMEN:  soft, nontender, no HSM or masses and bowel sounds normal     MS: extremities normal- no gross deformities noted, no evidence of inflammation in joints, FROM in all extremities.     SKIN: no suspicious lesions or rashes     NEURO: Normal strength and tone, sensory exam grossly normal, mentation intact and speech normal     PSYCH: mentation appears normal. and affect normal/bright     LYMPHATICS: No cervical adenopathy    Recent Labs   Lab Test 12/06/22  1000 08/31/22  0000 07/12/22  1145   HGB 14.9 " 13.9  --     182  --    NA  --   --  136.2   POTASSIUM  --   --  5.08   CR  --   --  0.80        Diagnostics:  No labs were ordered during this visit.   No EKG required, no history of coronary heart disease, significant arrhythmia, peripheral arterial disease or other structural heart disease.    Revised Cardiac Risk Index (RCRI):  The patient has the following serious cardiovascular risks for perioperative complications:   - No serious cardiac risks = 0 points     RCRI Interpretation: 0 points: Class I (very low risk - 0.4% complication rate)           Signed Electronically by: Octavio Muller PA-C  Copy of this evaluation report is provided to requesting physician.

## 2023-05-01 NOTE — NURSING NOTE
Chief Complaint   Patient presents with     Pre-Op Exam     Surgery/Procedure: Right wrist carpal tunnel release   Surgery Location: [facility]   Surgeon: Dr. Jernigan  Surgery Date: 05/04/23  Time of Surgery: AM

## 2023-05-18 ENCOUNTER — TRANSFERRED RECORDS (OUTPATIENT)
Dept: HEALTH INFORMATION MANAGEMENT | Facility: CLINIC | Age: 53
End: 2023-05-18
Payer: COMMERCIAL

## 2023-05-23 ENCOUNTER — TRANSFERRED RECORDS (OUTPATIENT)
Dept: FAMILY MEDICINE | Facility: CLINIC | Age: 53
End: 2023-05-23

## 2023-06-11 ENCOUNTER — HOSPITAL ENCOUNTER (EMERGENCY)
Facility: CLINIC | Age: 53
Discharge: HOME | End: 2023-06-11
Attending: EMERGENCY MEDICINE | Admitting: EMERGENCY MEDICINE
Payer: COMMERCIAL

## 2023-06-11 VITALS
DIASTOLIC BLOOD PRESSURE: 92 MMHG | OXYGEN SATURATION: 98 % | SYSTOLIC BLOOD PRESSURE: 142 MMHG | HEART RATE: 91 BPM | TEMPERATURE: 98.4 F | RESPIRATION RATE: 20 BRPM

## 2023-06-11 DIAGNOSIS — I47.10 SVT (SUPRAVENTRICULAR TACHYCARDIA) (H): ICD-10-CM

## 2023-06-11 LAB
ANION GAP SERPL CALCULATED.3IONS-SCNC: 11 MMOL/L (ref 7–15)
ATRIAL RATE - MUSE: 91 BPM
BUN SERPL-MCNC: 16.8 MG/DL (ref 6–20)
CALCIUM SERPL-MCNC: 9.1 MG/DL (ref 8.6–10)
CHLORIDE SERPL-SCNC: 107 MMOL/L (ref 98–107)
CREAT SERPL-MCNC: 0.75 MG/DL (ref 0.51–0.95)
DEPRECATED HCO3 PLAS-SCNC: 21 MMOL/L (ref 22–29)
DIASTOLIC BLOOD PRESSURE - MUSE: NORMAL MMHG
GFR SERPL CREATININE-BSD FRML MDRD: >90 ML/MIN/1.73M2
GLUCOSE SERPL-MCNC: 99 MG/DL (ref 70–99)
INTERPRETATION ECG - MUSE: NORMAL
MAGNESIUM SERPL-MCNC: 1.9 MG/DL (ref 1.7–2.3)
P AXIS - MUSE: 32 DEGREES
POTASSIUM SERPL-SCNC: 4.1 MMOL/L (ref 3.4–5.3)
PR INTERVAL - MUSE: 138 MS
QRS DURATION - MUSE: 72 MS
QT - MUSE: 348 MS
QTC - MUSE: 428 MS
R AXIS - MUSE: -7 DEGREES
SODIUM SERPL-SCNC: 139 MMOL/L (ref 136–145)
SYSTOLIC BLOOD PRESSURE - MUSE: NORMAL MMHG
T AXIS - MUSE: -14 DEGREES
VENTRICULAR RATE- MUSE: 91 BPM

## 2023-06-11 PROCEDURE — 36415 COLL VENOUS BLD VENIPUNCTURE: CPT | Performed by: EMERGENCY MEDICINE

## 2023-06-11 PROCEDURE — 83735 ASSAY OF MAGNESIUM: CPT | Performed by: EMERGENCY MEDICINE

## 2023-06-11 PROCEDURE — 93005 ELECTROCARDIOGRAM TRACING: CPT

## 2023-06-11 PROCEDURE — 99284 EMERGENCY DEPT VISIT MOD MDM: CPT

## 2023-06-11 PROCEDURE — 80048 BASIC METABOLIC PNL TOTAL CA: CPT | Performed by: EMERGENCY MEDICINE

## 2023-06-11 RX ORDER — METOPROLOL SUCCINATE 25 MG/1
12.5 TABLET, EXTENDED RELEASE ORAL DAILY
Qty: 15 TABLET | Refills: 0 | Status: SHIPPED | OUTPATIENT
Start: 2023-06-11 | End: 2023-06-22

## 2023-06-11 ASSESSMENT — ACTIVITIES OF DAILY LIVING (ADL): ADLS_ACUITY_SCORE: 35

## 2023-06-11 NOTE — ED TRIAGE NOTES
Brought in by EMS, c/o heart palpations and right sided chest pain. EMS gave adensonine.      Triage Assessment     Row Name 06/11/23 1036       Triage Assessment (Adult)    Airway WDL WDL       Respiratory WDL    Respiratory WDL WDL       Skin Circulation/Temperature WDL    Skin Circulation/Temperature WDL WDL       Cardiac WDL    Cardiac WDL WDL       Peripheral/Neurovascular WDL    Peripheral Neurovascular WDL WDL       Cognitive/Neuro/Behavioral WDL    Cognitive/Neuro/Behavioral WDL WDL

## 2023-06-11 NOTE — ED NOTES
Bed: ED01  Expected date:   Expected time:   Means of arrival:   Comments:  Vanda 2 53 F dizzy diaphoretic SVT  12 adenosine given now hr 92 114/80 eta 1025

## 2023-06-11 NOTE — ED PROVIDER NOTES
"  History     Chief Complaint:  Chest Pain       HPI   Ida Gayle is a 53 year old female with a history of multiple sclerosis, who presents today with an episode of palpitations.  The patient has had intermittent palpitations on and off for several years, and she has seen a cardiologist for this in the past.  She has never received a diagnosis.  She did have a stress test done 2/5/2020, which was negative.  Today, she had a cute onset of palpitations.  This caused symptoms including shortness of breath, lightheadedness, \"wooziness\" and \"wobbliness.\"  The 911 service recommended that patient take 324 of oral aspirin, which she did.  Typically, the palpitations resolve on their own, but these persisted, therefore she called 911.  Paramedics report that her heart rates were in the 210s.  Rhythm strip demonstrated SVT.  The patient was given 12 mg of IV adenosine, which cardioverted her into sinus rhythm.  The patient is feeling much better, though still has slight tightness.    Independent Historian:   EMS - They report the above symptoms.  Patient daughter corroborates history.    Review of External Notes:   Patient had normal stress test 2/5/2020.  Normal echocardiogram 7/12/2022.      Medications:    diazepam (VALIUM) 5 MG tablet  Ocrelizumab (OCREVUS IV)  rizatriptan (MAXALT) 10 MG tablet  tiZANidine (ZANAFLEX) 4 MG tablet        Past Medical History:    Past Medical History:   Diagnosis Date     Duplicated ureter, left      Multiple sclerosis (H) 2017     Seizure (H)        Past Surgical History:    Past Surgical History:   Procedure Laterality Date     CYSTECTOMY OVARIAN BENIGN  2010     [facility] REIMPLANT URETER,DUPLICATED URETER  1974        Physical Exam     Patient Vitals for the past 24 hrs:   BP Temp Temp src Pulse Resp SpO2   06/11/23 1035 (!) 142/92 98.4  F (36.9  C) Oral 91 20 98 %        Physical Exam  General: alert, lying comfortably on gurney  HENT: mucous membranes moist  CV: regular rate, " regular rhythm, no murmurs rubs or gallops.  Resp: normal effort, clear throughout, no crackles or wheezing  GI: abdomen soft and nontender, no guarding  MSK: no bony tenderness  Skin: appropriately warm and dry  Extremities: no edema, calves non-tender  Neuro: alert, clear speech, oriented  Psych: normal mood and affect      Emergency Department Course   ECG  ECG results from 06/11/23   EKG 12 lead     Value    Systolic Blood Pressure     Diastolic Blood Pressure     Ventricular Rate 91    Atrial Rate 91    VT Interval 138    QRS Duration 72        QTc 428    P Axis 32    R AXIS -7    T Axis -14    Interpretation ECG      Sinus rhythm  Possible Left atrial enlargement  Borderline ECG  When compared with ECG of 22-JUL-2011 02:25,  Questionable change in QRS axis  Nonspecific T wave abnormality now evident in Anterolateral leads       Laboratory:  Labs Ordered and Resulted from Time of ED Arrival to Time of ED Departure   BASIC METABOLIC PANEL - Abnormal       Result Value    Sodium 139      Potassium 4.1      Chloride 107      Carbon Dioxide (CO2) 21 (*)     Anion Gap 11      Urea Nitrogen 16.8      Creatinine 0.75      Calcium 9.1      Glucose 99      GFR Estimate >90     MAGNESIUM - Normal    Magnesium 1.9          Emergency Department Course & Assessments:    Interventions:  Medications - No data to display     Assessments:  11:28 AM    Independent Interpretation (X-rays, CTs, rhythm strip):  I reviewed the EMS rhythm strip and 12 lead.  Consistent with SVT (below), status post cardioversion.            Consultations/Discussion of Management or Tests:  None        Social Determinants of Health affecting care:   None    Disposition:  The patient was discharged to home.     Impression & Plan        Medical Decision Making:  Ida Gayle is a 53 year old female with history of multiple sclerosis who presents for evaluation of palpitations.  Upon arrival, the patient was back in a normal sinus rhythm.  I  reviewed the rhythm strip from paramedics, which is consistent with SVT.  She was administered 12 mg of IV adenosine, which I returned her rhythm to sinus.  She has never had a previous diagnosis of SVT in the past, though often has episodes of palpitations that resolved.  At this point, I would not start any medications.  Initial ECG shows normal sinus rhythm, possible delta waves..  A broad differential diagnosis was considered including SVT, Atrial fibrillation, ventricular arrhythmia, thyroid disease, acute electrolyte abnormality, anemia, heart disease, PE.no evidence for withdrawal syndrome, or toxidrome.  The workup and exam here in ED would indicate that supportive outpatient management is indicated.  I doubt PE as patient has had intermittent palpitations in the past, and does not have chest pain, shortness of breath, or leg swelling.  Doubt acute coronary syndrome given symptoms and exam.  I recommended outpatient follow-up with cardiology, and have placed a referral.  Return to the ED for palpitations that fail to resolve with vagal maneuvers, chest pain, syncope, increased shortness of breath, or other concerns.      Diagnosis:    ICD-10-CM    1. SVT (supraventricular tachycardia) (H)  I47.1 Adult Cardiology [facility] Referral       6/11/2023   Zuleyma Rand MD Pepper, Tracy Lynn, MD  06/13/23 1316

## 2023-06-26 ENCOUNTER — TELEPHONE (OUTPATIENT)
Dept: CARDIOLOGY | Facility: CLINIC | Age: 53
End: 2023-06-26
Payer: COMMERCIAL

## 2023-06-26 NOTE — TELEPHONE ENCOUNTER
"Received [employer] message from Ida.  She sent in a tracing from her I watch.  She was in ED on 6\11\23. She had SVT episode, with rates in the 150 range.  ED tracing was HR of 91.      Called and spoke with Ida.  She reports that the episodes of \"SVT\" have been more frequent of late, and \"they have not really been captured until now\" when she went to ED on 6\11.  These last for a short time, several minutes.  The longest and fastest was this past episode on 6\11.  States this runs in her family, siblings and father all have \"SVT\".       Ida expressed she is afraid to do activity, go out for a walk, she is frequently fearful now that she will have sudden heart rate of 175 or greater.  She said that within a couple minutes of it being that high, she became light-headed and felt like she was going to faint.   She did call 911 at that time.    We discussed what some possible triggers might be (dehydration, caffeine, alcohol).  Ida says she will definitely increase her hydration by 3 glasses of water per day.  She continues to take the Metoprolol Succinate 25mg tablet, 1\2 tab every evening.   Writer asked her if she has BP cuff at home, and she does.  Instructed that if MD decides to increase the dose to 25mg, that we would want her to check BP.  She thinks her average BP is around 120s over 70s.      She has apmnt with Dr. Macias on 7\25, and asks apmnt wait list for cancellations, but there is not one.   Writer said that our clinic will make sure she does not run out of Metoprolol before her appointment.  Instructed her to continue to take the dose regularly.  Instructed her that if she is symptomatic she will just need to call 911, until she can get seen by Dr. Macias.  Also recommended that she keep her PCP in the loop, and work with him.    Writer will collaborate with EP nurse team if they have any other ideas, then call back to the patient.    Aracelis Marmolejo RN on 6/26/2023 at 3:04 " PM

## 2023-06-27 NOTE — TELEPHONE ENCOUNTER
Writer spoke with EP nurse Aysha.  She said that they have not yet met the patient, and they are not able to make any further changes in medications until patient is seen in clinic.      Patient is taking her Metoprolol Succinate 12.5mg daily.  She is still very anxious, we discussed that while the fast heart rate is not harmful for a short period of time, she definitely will need to call 911 if it goes up over 175 and stays there for more than several minutes, as that is when she becomes symptomatic.    Writer invited Ida to call us for non-urgent questions or concerns, but again stressed to call 911 if she feels extremely anxious or like she cannot tolerate the fast rhythm, any symptoms that are scarey to her or making her feel like she is going to faint.   She verbalized understanding.    She knows she can call scheduling to check for any cancellations for Dr. Macias as well.    Aracelis Marmolejo RN on 6/27/2023 at 10:17 AM

## 2023-07-11 DIAGNOSIS — I47.10 PAROXYSMAL SUPRAVENTRICULAR TACHYCARDIA (H): ICD-10-CM

## 2023-07-11 RX ORDER — METOPROLOL SUCCINATE 25 MG/1
12.5 TABLET, EXTENDED RELEASE ORAL DAILY
Qty: 45 TABLET | Refills: 3 | Status: SHIPPED | OUTPATIENT
Start: 2023-07-11 | End: 2023-08-09

## 2023-08-02 ENCOUNTER — OFFICE VISIT (OUTPATIENT)
Dept: CARDIOLOGY | Facility: CLINIC | Age: 53
End: 2023-08-02
Attending: EMERGENCY MEDICINE
Payer: COMMERCIAL

## 2023-08-02 VITALS
HEIGHT: 70 IN | WEIGHT: 293 LBS | HEART RATE: 85 BPM | BODY MASS INDEX: 41.95 KG/M2 | SYSTOLIC BLOOD PRESSURE: 124 MMHG | DIASTOLIC BLOOD PRESSURE: 83 MMHG

## 2023-08-02 DIAGNOSIS — I47.10 SVT (SUPRAVENTRICULAR TACHYCARDIA) (H): Primary | ICD-10-CM

## 2023-08-02 PROCEDURE — 99204 OFFICE O/P NEW MOD 45 MIN: CPT | Performed by: INTERNAL MEDICINE

## 2023-08-02 NOTE — PROGRESS NOTES
PHYSICIAN NOTE:  This visit was completed in person at the [facility].      I had the pleasure of seeing Ms. Ida Gayle for evaluation of SVT.  She has been referred by Dr. Cesar Bland.    Ida is an extremely pleasant 53-year-old female with history of multiple sclerosis and intermittent palpitation for several years, dating back to her mid 20s.  She saw Dr. Chrales in 2019.  He advised her to purchase an iWatch in order to document the arrhythmia.    Since then the patient has recorded episodes of regular narrow QRS tachycardia with rates between 180 and 210 bpm.  In the recent past these typically lasted up to 1 minute.  While visiting [place] in the spring she had a longer episode, lasting 5-6 minutes.    On 6/11/2023 she developed tachycardia episode did not stop.  She felt really unwell, lightheaded, like she was about to faint.  911 was called.  Paramedics recorded a rapid SVT with rate of approximately 210 bpm.  Adenosine 12 mg terminated tachycardia.    She was started on Toprol-XL 12.5 mg daily.  She recently had recurrence of SVT despite Toprol-XL.    The patient is on every 6 month infusions of IV agent for management of MS.  She does not smoke and drinks alcohol on occasion.  She drinks very modest amounts of caffeine.    No chest pain with exertion but has had slight chest pressure ever since the incident on 6/11/2023.    Family history significant for her father undergoing ablation twice, probably for AF.  A paternal aunt has also had catheter ablation.      PHYSICAL EXAMINATION:  Vital signs: 128/83, 85, 135.6 kg, BMI 43.5  General: Very pleasant woman accompanied by her , in no distress  ENT/Mouth:  no nasal discharge.  Eyes:  normal conjunctivae.   Neck:  no thyromegaly or lymphadenopathy.  Chest/Lungs:  patient is not dyspneic.  Lungs CTA, without rales or wheezing  Cardiovascular:  Nl JVP, rhythm is regular.  No gallop, murmur or rub.    Abdomen:  no abdominal  distention.  Soft, negative HJR.    Extremities:  no edema  Skin:  no xanthelasma.    Neurologic:  alert & oriented x 3.  No tremor.    Vascular:  2+ carotids without bruits.  2+ radials.        DIAGNOSTIC STUDIES:  Laboratory studies: Sodium 139, potassium 4.9, creatinine 0.75, hematocrit 44.9%, WBC 6.7  ECG (6/11/2023): Sinus rhythm, WNL, no ventricular preexcitation.  Stress echocardiogram (07/2022): 6 minutes on Basilio protocol.  Normal test.      IMPRESSION:  Adenosine-responsive SVT.  Ida experiences frequent SVT though most events are brief.  She has had symptomatic SVT recurrence on low-dose Toprol-XL.  She feels rather unwell during these events and wishes to have no further SVT, if possible.  We had a good discussion about the pathophysiology of SVT and treatment options.  Treatment options include daily pharmacologic therapy to prevent SVT and EP study with catheter ablation.  With regard to the EP procedure I quoted the patient and greater than 95% likelihood of SVT cure after a single procedure.  The risk of serious complication is 1% to 1.5%.  Potential complication include but not limited to vascular injury, DVT, cardiac perforation, damage to the conduction system requiring pacemaker implantation, TIA/CVA and other unforeseen complications.  The patient has expressed her strong preference to her catheter ablation.    RECOMMENDATIONS:  Schedule SVT ablation.  Stop Toprol-XL 3 days before the procedure.    It was my pleasure seeing this delightful patient.  Please feel free to call with any questions.     David Macias MD, [facility]        Encounter Diagnosis   Name Primary?    SVT (supraventricular tachycardia) (H)        CURRENT MEDICATIONS:  Current Outpatient Medications   Medication Sig Dispense Refill    diazepam (VALIUM) 5 MG tablet TAKE 1-2 TABLETS BY MOUTH ONCE DAILY AS NEEDED FOR ANXIETY      metoprolol succinate ER (TOPROL XL) 25 MG 24 hr tablet Take 0.5 tablets (12.5 mg) by mouth daily  45 tablet 3    Ocrelizumab (OCREVUS IV)       rizatriptan (MAXALT) 10 MG tablet TAKE 1 TABLET BY MOUTH AT ONSET OF MIGRAINE MAY REPEAT 1 TIME IN 24 HOURS. NOT TO EXCEED 2 IN 24 HOURS      tiZANidine (ZANAFLEX) 4 MG tablet Take 1 tablet (4 mg) by mouth At Bedtime 90 tablet        ALLERGIES     Allergies   Allergen Reactions    Flagyl [Metronidazole] Hives    Miconazole Nitrate Hives     diflucan    Sulfa Antibiotics     Clavulanic Acid Diarrhea       PAST MEDICAL HISTORY:  Past Medical History:   Diagnosis Date    Duplicated ureter, left     Multiple sclerosis (H) 2017    Seizure (H)     As baby       PAST SURGICAL HISTORY:  Past Surgical History:   Procedure Laterality Date    CYSTECTOMY OVARIAN BENIGN  2010    [facility] REIMPLANT URETER,DUPLICATED URETER  1974       FAMILY HISTORY:  Family History   Problem Relation Age of Onset    Prostate Cancer Father     Cancer Maternal Grandmother         skin cancer    Multiple Sclerosis Brother     Multiple Sclerosis Cousin     Diabetes No family hx of     C.A.D. 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weddings     Employer: SELF   Tobacco Use    Smoking status: Never     Passive exposure: Never    Smokeless tobacco: Never   Substance and Sexual Activity    Alcohol use: Yes     Comment: 1x a month    Drug use: No    Sexual activity: Not Currently     Partners: Male   Other Topics Concern     Service No    Blood Transfusions No    Caffeine Concern No    Occupational Exposure No    Hobby Hazards No    Sleep Concern No    Stress Concern Yes     Comment: second marriage with children issues    Weight Concern Yes    Special Diet No    Exercise Yes     Comment: 2 times per week    Seat Belt Yes       Review of Systems:  Skin:          Eyes:         ENT:        "  Respiratory:          Cardiovascular:         Gastroenterology:        Genitourinary:         Musculoskeletal:         Neurologic:         Psychiatric:         Heme/Lymph/Imm:         Endocrine:           Physical Exam:  Vitals: Ht 1.765 m (5' 9.5\")   LMP 08/02/2021 (Approximate)   BMI 42.94 kg/m      Constitutional:           Skin:             Head:           Eyes:           Lymph:      ENT:           Neck:           Respiratory:            Cardiac:                                                           GI:           Extremities and Muscular Skeletal:                 Neurological:           Psych:           CC  Zuleyma Rand MD  [facility]  [address]  [place] [address]                "

## 2023-08-02 NOTE — LETTER
8/2/2023    MONICA DUNN MD  [address]  [facility] [address]    RE: Ida Gayle       Dear Colleague,     I had the pleasure of seeing Ida Gayle in the [facility].  PHYSICIAN NOTE:  This visit was completed in person at the [facility].      I had the pleasure of seeing Ms. Ida Gayle for evaluation of SVT.  She has been referred by Dr. Monica Dunn.    Ida is an extremely pleasant 53-year-old female with history of multiple sclerosis and intermittent palpitation for several years, dating back to her mid 20s.  She saw Dr. Charles in 2019.  He advised her to purchase an iWatch in order to document the arrhythmia.    Since then the patient has recorded episodes of regular narrow QRS tachycardia with rates between 180 and 210 bpm.  In the recent past these typically lasted up to 1 minute.  While visiting [place] in the spring she had a longer episode, lasting 5-6 minutes.    On 6/11/2023 she developed tachycardia episode did not stop.  She felt really unwell, lightheaded, like she was about to faint.  911 was called.  Paramedics recorded a rapid SVT with rate of approximately 210 bpm.  Adenosine 12 mg terminated tachycardia.    She was started on Toprol-XL 12.5 mg daily.  She recently had recurrence of SVT despite Toprol-XL.    The patient is on every 6 month infusions of IV agent for management of MS.  She does not smoke and drinks alcohol on occasion.  She drinks very modest amounts of caffeine.    No chest pain with exertion but has had slight chest pressure ever since the incident on 6/11/2023.    Family history significant for her father undergoing ablation twice, probably for AF.  A paternal aunt has also had catheter ablation.      PHYSICAL EXAMINATION:  Vital signs: 128/83, 85, 135.6 kg, BMI 43.5  General: Very pleasant woman accompanied by her , in no distress  ENT/Mouth:  no nasal discharge.  Eyes:  normal conjunctivae.    Neck:  no thyromegaly or lymphadenopathy.  Chest/Lungs:  patient is not dyspneic.  Lungs CTA, without rales or wheezing  Cardiovascular:  Nl JVP, rhythm is regular.  No gallop, murmur or rub.    Abdomen:  no abdominal distention.  Soft, negative HJR.    Extremities:  no edema  Skin:  no xanthelasma.    Neurologic:  alert & oriented x 3.  No tremor.    Vascular:  2+ carotids without bruits.  2+ radials.        DIAGNOSTIC STUDIES:  Laboratory studies: Sodium 139, potassium 4.9, creatinine 0.75, hematocrit 44.9%, WBC 6.7  ECG (6/11/2023): Sinus rhythm, WNL, no ventricular preexcitation.  Stress echocardiogram (07/2022): 6 minutes on Basilio protocol.  Normal test.      IMPRESSION:  Adenosine-responsive SVT.  Ida experiences frequent SVT though most events are brief.  She has had symptomatic SVT recurrence on low-dose Toprol-XL.  She feels rather unwell during these events and wishes to have no further SVT, if possible.  We had a good discussion about the pathophysiology of SVT and treatment options.  Treatment options include daily pharmacologic therapy to prevent SVT and EP study with catheter ablation.  With regard to the EP procedure I quoted the patient and greater than 95% likelihood of SVT cure after a single procedure.  The risk of serious complication is 1% to 1.5%.  Potential complication include but not limited to vascular injury, DVT, cardiac perforation, damage to the conduction system requiring pacemaker implantation, TIA/CVA and other unforeseen complications.  The patient has expressed her strong preference to her catheter ablation.    RECOMMENDATIONS:  Schedule SVT ablation.  Stop Toprol-XL 3 days before the procedure.    It was my pleasure seeing this delightful patient.  Please feel free to call with any questions.     David Macias MD, [facility]        Encounter Diagnosis   Name Primary?    SVT (supraventricular tachycardia) (H)        CURRENT MEDICATIONS:  Current Outpatient Medications    Medication Sig Dispense Refill    diazepam (VALIUM) 5 MG tablet TAKE 1-2 TABLETS BY MOUTH ONCE DAILY AS NEEDED FOR ANXIETY      metoprolol succinate ER (TOPROL XL) 25 MG 24 hr tablet Take 0.5 tablets (12.5 mg) by mouth daily 45 tablet 3    Ocrelizumab (OCREVUS IV)       rizatriptan (MAXALT) 10 MG tablet TAKE 1 TABLET BY MOUTH AT ONSET OF MIGRAINE MAY REPEAT 1 TIME IN 24 HOURS. NOT TO EXCEED 2 IN 24 HOURS      tiZANidine (ZANAFLEX) 4 MG tablet Take 1 tablet (4 mg) by mouth At Bedtime 90 tablet        ALLERGIES     Allergies   Allergen Reactions    Flagyl [Metronidazole] Hives    Miconazole Nitrate Hives     diflucan    Sulfa Antibiotics     Clavulanic Acid Diarrhea       PAST MEDICAL HISTORY:  Past Medical History:   Diagnosis Date    Duplicated ureter, left     Multiple sclerosis (H) 2017    Seizure (H)     As baby       PAST SURGICAL HISTORY:  Past Surgical History:   Procedure Laterality Date    CYSTECTOMY OVARIAN BENIGN  2010    [facility] REIMPLANT URETER,DUPLICATED URETER  1974       FAMILY HISTORY:  Family History   Problem Relation Age of Onset    Prostate Cancer Father     Cancer Maternal Grandmother         skin cancer    Multiple Sclerosis Brother     Multiple Sclerosis Cousin     Diabetes No family hx of     C.A.D. 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weddings     Employer: SELF   Tobacco Use    Smoking status: Never     Passive exposure: Never    Smokeless tobacco: Never   Substance and Sexual Activity    Alcohol use: Yes     Comment: 1x a month    Drug use: No    Sexual activity: Not Currently     Partners: Male   Other Topics Concern     Service No    Blood Transfusions No    Caffeine Concern No    Occupational Exposure No    Hobby Hazards No    Sleep Concern No    Stress Concern  "Yes     Comment: second marriage with children issues    Weight Concern Yes    Special Diet No    Exercise Yes     Comment: 2 times per week    Seat Belt Yes       Review of Systems:  Skin:          Eyes:         ENT:         Respiratory:          Cardiovascular:         Gastroenterology:        Genitourinary:         Musculoskeletal:         Neurologic:         Psychiatric:         Heme/Lymph/Imm:         Endocrine:           Physical Exam:  Vitals: Ht 1.765 m (5' 9.5\")   LMP 08/02/2021 (Approximate)   BMI 42.94 kg/m      Constitutional:           Skin:             Head:           Eyes:           Lymph:      ENT:           Neck:           Respiratory:            Cardiac:                                                           GI:           Extremities and Muscular Skeletal:                 Neurological:           Psych:           CC  Zuleyma Rand MD  [facility]  [address]  [place] [address]                  Thank you for allowing me to participate in the care of your patient.      Sincerely,     David Macias MD     [facility]  "

## 2023-08-09 ENCOUNTER — MYC MEDICAL ADVICE (OUTPATIENT)
Dept: CARDIOLOGY | Facility: CLINIC | Age: 53
End: 2023-08-09
Payer: COMMERCIAL

## 2023-08-09 DIAGNOSIS — I47.10 PAROXYSMAL SUPRAVENTRICULAR TACHYCARDIA (H): ICD-10-CM

## 2023-08-09 RX ORDER — METOPROLOL SUCCINATE 25 MG/1
12.5 TABLET, EXTENDED RELEASE ORAL DAILY
Qty: 45 TABLET | Refills: 3 | Status: ON HOLD | OUTPATIENT
Start: 2023-08-09 | End: 2023-08-18

## 2023-08-09 NOTE — TELEPHONE ENCOUNTER
23 Pt sent [employer] message stating she got notice from [facility], requesting 90 day supply for Toprol 25 mg tabs ( pt takes 12.5 mg daily).  [facility] Refill Guide Reviewed     Received refill request for: Metoprolol 25 mg ( 12.5 mg daily)   Last OV was: 23  Labs/EK2023  F/U scheduled : 23 Ablation scheduled  Rx sent to:  [facility]   Pt updated via [employer]   [facility] 423 am

## 2023-08-16 ENCOUNTER — TELEPHONE (OUTPATIENT)
Dept: CARDIOLOGY | Facility: CLINIC | Age: 53
End: 2023-08-16
Payer: COMMERCIAL

## 2023-08-16 DIAGNOSIS — I47.10 PAROXYSMAL SUPRAVENTRICULAR TACHYCARDIA (H): ICD-10-CM

## 2023-08-16 DIAGNOSIS — I47.10 SVT (SUPRAVENTRICULAR TACHYCARDIA) (H): Primary | ICD-10-CM

## 2023-08-16 RX ORDER — SODIUM CHLORIDE, SODIUM LACTATE, POTASSIUM CHLORIDE, CALCIUM CHLORIDE 600; 310; 30; 20 MG/100ML; MG/100ML; MG/100ML; MG/100ML
INJECTION, SOLUTION INTRAVENOUS CONTINUOUS
Status: CANCELLED | OUTPATIENT
Start: 2023-08-16

## 2023-08-16 RX ORDER — LIDOCAINE 40 MG/G
CREAM TOPICAL
Status: CANCELLED | OUTPATIENT
Start: 2023-08-16

## 2023-08-16 NOTE — TELEPHONE ENCOUNTER
Spoke to patient to review instructions for SVT ablation scheduled for 8/17.  Patient aware that she is to be NPO after 3:30am and may take sips of water with am medications.  Patient confirmed she has been holding her metoprolol since 8/13.  She may have clear liquids until 7:30am.  Patient to arrive at 9;30am.  Patient aware that she will NOT be staying overnight after procedure unless there are complications.  Patient has arranged for ride and someone to be with him/her for the first 24 hours.  Patient to check temp the morning of procedure and call if temp >100.  Patient to call [facility] at [phone] to alert them so procedure can be cancelled.  Patient provided verbal understanding regarding above.   NOAH Zavaleta

## 2023-08-17 ENCOUNTER — HOSPITAL ENCOUNTER (OUTPATIENT)
Facility: CLINIC | Age: 53
Discharge: HOME | End: 2023-08-18
Admitting: INTERNAL MEDICINE
Payer: COMMERCIAL

## 2023-08-17 DIAGNOSIS — I47.10 PAROXYSMAL SUPRAVENTRICULAR TACHYCARDIA (H): ICD-10-CM

## 2023-08-17 DIAGNOSIS — I47.10 SVT (SUPRAVENTRICULAR TACHYCARDIA) (H): Primary | ICD-10-CM

## 2023-08-17 LAB
ACT BLD: 184 SECONDS (ref 74–150)
ACT BLD: 326 SECONDS (ref 74–150)
ACT BLD: 413 SECONDS (ref 74–150)
ANION GAP SERPL CALCULATED.3IONS-SCNC: 12 MMOL/L (ref 7–15)
BUN SERPL-MCNC: 13.1 MG/DL (ref 6–20)
CALCIUM SERPL-MCNC: 9.4 MG/DL (ref 8.6–10)
CHLORIDE SERPL-SCNC: 103 MMOL/L (ref 98–107)
CREAT SERPL-MCNC: 0.76 MG/DL (ref 0.51–0.95)
DEPRECATED HCO3 PLAS-SCNC: 23 MMOL/L (ref 22–29)
ERYTHROCYTE [DISTWIDTH] IN BLOOD BY AUTOMATED COUNT: 14 % (ref 10–15)
GFR SERPL CREATININE-BSD FRML MDRD: >90 ML/MIN/1.73M2
GLUCOSE SERPL-MCNC: 105 MG/DL (ref 70–99)
HCT VFR BLD AUTO: 41.3 % (ref 35–47)
HGB BLD-MCNC: 13.9 G/DL (ref 11.7–15.7)
MCH RBC QN AUTO: 30.5 PG (ref 26.5–33)
MCHC RBC AUTO-ENTMCNC: 33.7 G/DL (ref 31.5–36.5)
MCV RBC AUTO: 91 FL (ref 78–100)
PLATELET # BLD AUTO: 276 10E3/UL (ref 150–450)
POTASSIUM SERPL-SCNC: 4.4 MMOL/L (ref 3.4–5.3)
RBC # BLD AUTO: 4.56 10E6/UL (ref 3.8–5.2)
SODIUM SERPL-SCNC: 138 MMOL/L (ref 136–145)
WBC # BLD AUTO: 7 10E3/UL (ref 4–11)

## 2023-08-17 PROCEDURE — C1730 CATH, EP, 19 OR FEW ELECT: HCPCS | Performed by: INTERNAL MEDICINE

## 2023-08-17 PROCEDURE — 250N000009 HC RX 250: Performed by: INTERNAL MEDICINE

## 2023-08-17 PROCEDURE — 250N000013 HC RX MED GY IP 250 OP 250 PS 637: Performed by: INTERNAL MEDICINE

## 2023-08-17 PROCEDURE — 96365 THER/PROPH/DIAG IV INF INIT: CPT | Mod: 59 | Performed by: INTERNAL MEDICINE

## 2023-08-17 PROCEDURE — 36415 COLL VENOUS BLD VENIPUNCTURE: CPT | Performed by: INTERNAL MEDICINE

## 2023-08-17 PROCEDURE — 99152 MOD SED SAME PHYS/QHP 5/>YRS: CPT | Performed by: INTERNAL MEDICINE

## 2023-08-17 PROCEDURE — 80048 BASIC METABOLIC PNL TOTAL CA: CPT | Performed by: INTERNAL MEDICINE

## 2023-08-17 PROCEDURE — 250N000011 HC RX IP 250 OP 636: Performed by: INTERNAL MEDICINE

## 2023-08-17 PROCEDURE — C1733 CATH, EP, OTHR THAN COOL-TIP: HCPCS | Performed by: INTERNAL MEDICINE

## 2023-08-17 PROCEDURE — 93653 COMPRE EP EVAL TX SVT: CPT | Performed by: INTERNAL MEDICINE

## 2023-08-17 PROCEDURE — 93005 ELECTROCARDIOGRAM TRACING: CPT

## 2023-08-17 PROCEDURE — 272N000001 HC OR GENERAL SUPPLY STERILE: Performed by: INTERNAL MEDICINE

## 2023-08-17 PROCEDURE — 258N000003 HC RX IP 258 OP 636: Performed by: INTERNAL MEDICINE

## 2023-08-17 PROCEDURE — 999N000054 HC STATISTIC EKG NON-CHARGEABLE

## 2023-08-17 PROCEDURE — 93662 INTRACARDIAC ECG (ICE): CPT | Mod: 26 | Performed by: INTERNAL MEDICINE

## 2023-08-17 PROCEDURE — 85027 COMPLETE CBC AUTOMATED: CPT | Performed by: INTERNAL MEDICINE

## 2023-08-17 PROCEDURE — C1769 GUIDE WIRE: HCPCS | Performed by: INTERNAL MEDICINE

## 2023-08-17 PROCEDURE — 99153 MOD SED SAME PHYS/QHP EA: CPT | Performed by: INTERNAL MEDICINE

## 2023-08-17 PROCEDURE — 85347 COAGULATION TIME ACTIVATED: CPT | Mod: 91

## 2023-08-17 PROCEDURE — 93010 ELECTROCARDIOGRAM REPORT: CPT | Mod: XU | Performed by: INTERNAL MEDICINE

## 2023-08-17 PROCEDURE — C1759 CATH, INTRA ECHOCARDIOGRAPHY: HCPCS | Performed by: INTERNAL MEDICINE

## 2023-08-17 PROCEDURE — 93799 UNLISTED CV SVC/PROCEDURE: CPT | Performed by: INTERNAL MEDICINE

## 2023-08-17 PROCEDURE — C1894 INTRO/SHEATH, NON-LASER: HCPCS | Performed by: INTERNAL MEDICINE

## 2023-08-17 RX ORDER — ADENOSINE 3 MG/ML
INJECTION, SOLUTION INTRAVENOUS
Status: DISCONTINUED | OUTPATIENT
Start: 2023-08-17 | End: 2023-08-17 | Stop reason: HOSPADM

## 2023-08-17 RX ORDER — PROTAMINE SULFATE 10 MG/ML
INJECTION, SOLUTION INTRAVENOUS
Status: DISCONTINUED | OUTPATIENT
Start: 2023-08-17 | End: 2023-08-17 | Stop reason: HOSPADM

## 2023-08-17 RX ORDER — MIDAZOLAM HCL IN 0.9 % NACL/PF 1 MG/ML
.5-6 PLASTIC BAG, INJECTION (ML) INTRAVENOUS CONTINUOUS PRN
Status: DISCONTINUED | OUTPATIENT
Start: 2023-08-17 | End: 2023-08-17 | Stop reason: HOSPADM

## 2023-08-17 RX ORDER — NALOXONE HYDROCHLORIDE 0.4 MG/ML
0.2 INJECTION, SOLUTION INTRAMUSCULAR; INTRAVENOUS; SUBCUTANEOUS
Status: DISCONTINUED | OUTPATIENT
Start: 2023-08-17 | End: 2023-08-18 | Stop reason: HOSPADM

## 2023-08-17 RX ORDER — HEPARIN SODIUM 200 [USP'U]/100ML
100-600 INJECTION, SOLUTION INTRAVENOUS CONTINUOUS PRN
Status: DISCONTINUED | OUTPATIENT
Start: 2023-08-17 | End: 2023-08-17 | Stop reason: HOSPADM

## 2023-08-17 RX ORDER — KETOROLAC TROMETHAMINE 30 MG/ML
INJECTION, SOLUTION INTRAMUSCULAR; INTRAVENOUS
Status: DISCONTINUED | OUTPATIENT
Start: 2023-08-17 | End: 2023-08-17 | Stop reason: HOSPADM

## 2023-08-17 RX ORDER — DIPHENHYDRAMINE HYDROCHLORIDE 50 MG/ML
INJECTION INTRAMUSCULAR; INTRAVENOUS
Status: DISCONTINUED | OUTPATIENT
Start: 2023-08-17 | End: 2023-08-17 | Stop reason: HOSPADM

## 2023-08-17 RX ORDER — OXYCODONE AND ACETAMINOPHEN 5; 325 MG/1; MG/1
1 TABLET ORAL EVERY 4 HOURS PRN
Status: DISCONTINUED | OUTPATIENT
Start: 2023-08-17 | End: 2023-08-18 | Stop reason: HOSPADM

## 2023-08-17 RX ORDER — HEPARIN SODIUM 200 [USP'U]/100ML
100-500 INJECTION, SOLUTION INTRAVENOUS CONTINUOUS PRN
Status: DISCONTINUED | OUTPATIENT
Start: 2023-08-17 | End: 2023-08-17 | Stop reason: HOSPADM

## 2023-08-17 RX ORDER — ACETAMINOPHEN 325 MG/1
650 TABLET ORAL EVERY 4 HOURS PRN
Status: DISCONTINUED | OUTPATIENT
Start: 2023-08-17 | End: 2023-08-18 | Stop reason: HOSPADM

## 2023-08-17 RX ORDER — DIAZEPAM 5 MG
5 TABLET ORAL EVERY 8 HOURS PRN
Status: DISCONTINUED | OUTPATIENT
Start: 2023-08-17 | End: 2023-08-18 | Stop reason: HOSPADM

## 2023-08-17 RX ORDER — NALOXONE HYDROCHLORIDE 0.4 MG/ML
0.4 INJECTION, SOLUTION INTRAMUSCULAR; INTRAVENOUS; SUBCUTANEOUS
Status: DISCONTINUED | OUTPATIENT
Start: 2023-08-17 | End: 2023-08-18 | Stop reason: HOSPADM

## 2023-08-17 RX ORDER — SODIUM CHLORIDE, SODIUM LACTATE, POTASSIUM CHLORIDE, CALCIUM CHLORIDE 600; 310; 30; 20 MG/100ML; MG/100ML; MG/100ML; MG/100ML
INJECTION, SOLUTION INTRAVENOUS CONTINUOUS
Status: DISCONTINUED | OUTPATIENT
Start: 2023-08-17 | End: 2023-08-17 | Stop reason: HOSPADM

## 2023-08-17 RX ORDER — LIDOCAINE 40 MG/G
CREAM TOPICAL
Status: DISCONTINUED | OUTPATIENT
Start: 2023-08-17 | End: 2023-08-17 | Stop reason: HOSPADM

## 2023-08-17 RX ORDER — HEPARIN SODIUM 1000 [USP'U]/ML
INJECTION, SOLUTION INTRAVENOUS; SUBCUTANEOUS
Status: DISCONTINUED | OUTPATIENT
Start: 2023-08-17 | End: 2023-08-17 | Stop reason: HOSPADM

## 2023-08-17 RX ORDER — FENTANYL CITRATE 50 UG/ML
INJECTION, SOLUTION INTRAMUSCULAR; INTRAVENOUS
Status: DISCONTINUED | OUTPATIENT
Start: 2023-08-17 | End: 2023-08-17 | Stop reason: HOSPADM

## 2023-08-17 RX ADMIN — APIXABAN 5 MG: 5 TABLET, FILM COATED ORAL at 21:12

## 2023-08-17 RX ADMIN — TIZANIDINE 4 MG: 4 TABLET ORAL at 21:28

## 2023-08-17 RX ADMIN — DIAZEPAM 5 MG: 5 TABLET ORAL at 21:28

## 2023-08-17 RX ADMIN — SODIUM CHLORIDE, POTASSIUM CHLORIDE, SODIUM LACTATE AND CALCIUM CHLORIDE: 600; 310; 30; 20 INJECTION, SOLUTION INTRAVENOUS at 09:51

## 2023-08-17 RX ADMIN — ISOPROTERENOL HYDROCHLORIDE 1 MCG/MIN: 0.2 INJECTION, SOLUTION INTRAMUSCULAR; INTRAVENOUS at 14:00

## 2023-08-17 RX ADMIN — ACETAMINOPHEN 650 MG: 325 TABLET, FILM COATED ORAL at 21:28

## 2023-08-17 ASSESSMENT — ACTIVITIES OF DAILY LIVING (ADL)
ADLS_ACUITY_SCORE: 33
ADLS_ACUITY_SCORE: 35
ADLS_ACUITY_SCORE: 33
ADLS_ACUITY_SCORE: 35

## 2023-08-17 NOTE — Clinical Note
Potential access sites were evaluated for patency using ultrasound.   The right femoral vein was selected. Access was obtained x3 under with Sonosite guidance using a micropuncture 21 gauge needle with direct visualization of needle entry.

## 2023-08-17 NOTE — PLAN OF CARE
Goal Outcome Evaluation:      Plan of Care Reviewed With: patient, spouse, child        Pt arrived to unit at 1700, drowsy from sedation. A&O x4. VSS, O2 stable on RA. Denies pain. R groin site WDL, CMS intact, stopcock loosened at 1845 per orders. Site remains unchanged. Tolerating current diet, fluids encouraged. Bedrest until 2045.

## 2023-08-17 NOTE — PRE-PROCEDURE
GENERAL PRE-PROCEDURE:   Procedure:  EPS, possible ablation  Date/Time:  8/17/2023 1:03 PM    Written consent obtained?: Yes    Risks and benefits: Risks, benefits and alternatives were discussed    Consent given by:  Patient  Patient states understanding of procedure being performed: Yes    Patient's understanding of procedure matches consent: Yes    Procedure consent matches procedure scheduled: Yes    Expected level of sedation:  Moderate  Appropriately NPO:  Yes  ASA Class:  2  Mallampati  :  Grade 2- soft palate, base of uvula, tonsillar pillars, and portion of posterior pharyngeal wall visible  Lungs:  Lungs clear with good breath sounds bilaterally  Heart:  Normal heart sounds and rate  History & Physical reviewed:  History and physical reviewed and no updates needed  Statement of review:  I have reviewed the lab findings, diagnostic data, medications, and the plan for sedation

## 2023-08-17 NOTE — PROGRESS NOTES
[facility] Admission Nursing Note    Patient Information  Name: Ida Gayle  Age: 53 year old  Reason for admission: SVT ablation  [facility] arrival time: 0923    Visitor Information  Name: Fabricio     Patient Admission/Assessment   Pre-procedure assessment complete: Yes  If abnormal assessment/labs, provider notified: N/A  NPO: Yes  Medications held per instructions/orders: Yes  Consent: deferred  If applicable, pregnancy test status: deferred  Patient oriented to room: Yes  Education/questions answered: Yes  Plan/other: Proceed as scheduled    Discharge Planning  Discharge name/phone number: Fabricio [phone]  Overnight post sedation caregiver: Fabricio  Discharge location: [place]    Bisi Vanessa RN

## 2023-08-17 NOTE — PROGRESS NOTES
Dictated.    Easily inducible atrial tachycardia.  Lengthy procedure, challenging mapping & ablation, tachycardia mechanism appears to have been a small a reentrant circuit in the anterior LA, near the mitral valve (around 11 o' clock).    The procedure was ultimately successful.  Given the length of the procedure and the fact that the patient received very large amounts of sedation (14 mg of Versed and 400 mcg of fentanyl), we will keep the patient overnight for observation.      Plan:  -Bedrest for 4 hours  -Monitor overnight  -Eliquis 5 mg twice daily for 4 weeks, first dose tonight

## 2023-08-17 NOTE — DISCHARGE INSTRUCTIONS
SVT Ablation Discharge Instructions      After you go home:    Have an adult stay with you until tomorrow.  You may resume your normal diet.       For 24 hours - due to the sedation you received:  Relax and take it easy.  Do NOT make any important or legal decisions.  Do NOT drive or operate machines at home or at work.  Do NOT drink alcohol.    Care of Puncture Sites:    For the first 24 hrs - check the puncture site every 1-2 hours while awake.  For 2 days, when you cough, sneeze, laugh or move your bowels, hold your hand over the puncture site and press firmly.  Remove the dressing(s) after 24 hours. If there is minor oozing, apply a bandaid and remove it after 12 hours.  It is normal to have a small bruise, pea sized lump or soreness at the site.  You may shower tomorrow. Do NOT take a bath, or use a hot tub or pool for at least 3 days. Do NOT scrub the site. Do not use lotion or powder near the puncture site.      Activity - For 3 days:    No stooping or squatting  Do NOT do any heavy activity such as exercise, lifting, or straining.   Do NOT do housework, yard work or any activity that make you sweat  Do NOT lift more than 10 pounds      Bleeding:     If you start bleeding from the site in your groin/chest, lie down flat and press firmly on the site for 10 minutes.   Once bleeding stops, lay flat for 2 hours.  Call [facility] as soon as you can.       Call 911 right away if you have heavy bleeding or bleeding that does not stop.      Medicines:    Take your medications, including blood thinners, unless your provider tells you not to.  If you have stopped any medicines, check with your provider about when to restart them.  If you have pain or shortness of breath, you may take Advil (ibuprofen) or Tylenol (acetaminophen).      Follow Up Appointments:    9/19/2023 with CON Altamirano at 1:30pm in [place]  You will receive a phone call Friday, August 18th by NOAH Olmstead to see how you are  doing  Call the clinic if:    You have increased pain or a large or growing hard lump around the site.  The site is red, swollen, hot or tender.  Blood or fluid is draining from the site.  You have chills or a fever greater than 101 F (38 C).  Your leg feels numb, cool or changes color.  Increased pain in the chest and/or groin.  Increased shortness of breath  Chest pain not relieved by Tylenol or Advil  New pain in the back or belly that you cannot control with Tylenol.  Recurrent irregular or fast heart rate lasting over 2 hours.  Any questions or concerns.    Heart rhythms:  You may have some irregular heartbeats. These feel very strong. They may make you feel that the fast heart rhythm is going to start again.  Give it time. The irregular beats should occur less often.       [facility]:    [phone] ( 8am-5pm M-F)  Lara Adams    [phone] option 2 (7 days a week)  on call Cardiologist

## 2023-08-18 VITALS
OXYGEN SATURATION: 97 % | BODY MASS INDEX: 41.95 KG/M2 | WEIGHT: 293 LBS | SYSTOLIC BLOOD PRESSURE: 127 MMHG | RESPIRATION RATE: 18 BRPM | HEIGHT: 70 IN | TEMPERATURE: 98.5 F | DIASTOLIC BLOOD PRESSURE: 83 MMHG | HEART RATE: 73 BPM

## 2023-08-18 PROCEDURE — 99214 OFFICE O/P EST MOD 30 MIN: CPT | Performed by: NURSE PRACTITIONER

## 2023-08-18 PROCEDURE — 250N000013 HC RX MED GY IP 250 OP 250 PS 637: Performed by: INTERNAL MEDICINE

## 2023-08-18 PROCEDURE — 999N000184 HC STATISTIC TELEMETRY

## 2023-08-18 PROCEDURE — 36591 DRAW BLOOD OFF VENOUS DEVICE: CPT

## 2023-08-18 PROCEDURE — 999N000071 HC STATISTIC HEART CATH LAB OR EP LAB

## 2023-08-18 RX ADMIN — ACETAMINOPHEN 650 MG: 325 TABLET, FILM COATED ORAL at 06:14

## 2023-08-18 RX ADMIN — APIXABAN 5 MG: 5 TABLET, FILM COATED ORAL at 09:00

## 2023-08-18 ASSESSMENT — ACTIVITIES OF DAILY LIVING (ADL)
ADLS_ACUITY_SCORE: 36
ADLS_ACUITY_SCORE: 35
ADLS_ACUITY_SCORE: 36

## 2023-08-18 NOTE — PROGRESS NOTES
"EP Progress Note          Assessment and Plan:   Ida Gayle is a 52 yo female with a history of symptomatic and recurrent SVT.  Her tachycardia rates range from 180 to 210 bpm.  She elected to pursue EP study and ablation therapy.  This took place on 8/17/2023 with .    1. symptomatic SVT with rates up to 180 to 210 bpm  Challenging, but successful catheter ablation of a left atrial micro reentrant tachycardia.  No inducible arrhythmia was noted after ablation therapy.    Due to the length of the case and sedation used patient was monitored overnight.  Given the number of RF lesions in the left atrium  recommended starting Eliquis for 4 weeks.  I have sent the Eliquis prescription to her [facility] pharmacy per her request.    Restrictions reviewed with the patient and her .  Also noted on her after visit summary.    Should she have any chest pressure okay to take ibuprofen with food for a few days.   Patient is already scheduled to see  on 9/19.  Should she have any questions or concerns in the interim she will contact us.    Okay to discharge home.    Shanthi Phillips NP, APRN CNP    30 minutes was completed.  This included discussion/education with the patient and her , documentation, and discharge.  Beeper [phone]              Interval History:   Doing well.  Has mild discomfort with deep inspiration.  Denies chest pain, shortness of breath, lightheadedness, dizziness, palpitations.  Does get some right leg tingling from her MS which is chronic.  VSSA.  Telemetry sinus rhythm.         Medications:      apixaban ANTICOAGULANT  5 mg Oral BID    tiZANidine  4 mg Oral At Bedtime             Review of Systems: If done, described below  The Review of Systems is negative other than noted in the HPI             Physical Exam:   Blood pressure 128/76, pulse 62, temperature 98.7  F (37.1  C), temperature source Oral, resp. rate 18, height 1.778 m (5' 10\"), weight 134.8 kg (297 " lb 1.6 oz), last menstrual period 2021, SpO2 99 %, not currently breastfeeding.      Vital Sign Ranges  Temperature Temp  Av.7  F (37.1  C)  Min: 98.7  F (37.1  C)  Max: 98.7  F (37.1  C)   Blood pressure Systolic (24hrs), Av , Min:128 , Max:153        Diastolic (24hrs), Av, Min:76, Max:105      Pulse Pulse  Av.2  Min: 62  Max: 106   Respirations Resp  Av.9  Min: 0  Max: 27   Pulse oximetry SpO2  Av %  Min: 98 %  Max: 100 %         Intake/Output Summary (Last 24 hours) at 2023 0733  Last data filed at 2023 2100  Gross per 24 hour   Intake 350 ml   Output 700 ml   Net -350 ml       Constitutional:   in no apparent distress     Lungs:   symmetric, clear to auscultation     Cardiovascular:   regular rate and rhythm, normal S1 and S2, and no murmur noted     Extremities and Back:   No edema, right femoral access site without hematoma or bruit.              Data:     Lab Results   Component Value Date    WBC 7.0 2023    HGB 13.9 2023    HCT 41.3 2023     2023     2023    POTASSIUM 4.4 2023    CHLORIDE 103 2023    CO2 23 2023    BUN 13.1 2023    CR 0.76 2023     (H) 2023    DD <0.2 2008    TROPI 0.016 2008    AST 10 2017    ALT 10 2017    ALKPHOS 59 2022    BILITOTAL 0.8 2022    INR 0.99 2014

## 2023-08-18 NOTE — PLAN OF CARE
VS:  Stable  Assist by: SB assist once off bedrest at 2100h     Cardiac: RRR; CP free overnight   Neuro: A&Ox4  CMS: Numbness present to Legs (MS baseline)  Respi: clear to auscultation; on RA    : Continent; voiding freely   GI: Abdo obese, soft; non-tender; BS audible;     Strip/Tele: STACH    Pressure areas/Skin:    - PA's intact    -  R groin access -> WDL       LDA's:     PIVC to L  arm SL       Plans:    > EP:   - Bedrest for 4 hours    - Monitor overnight    - Eliquis 5 mg twice daily for 4 weeks, first dose tonight            Problem: Plan of Care - These are the overarching goals to be used throughout the patient stay.    Goal: Absence of Hospital-Acquired Illness or Injury  Intervention: Identify and Manage Fall Risk  Recent Flowsheet Documentation  Taken 8/17/2023 1930 by Liliane Moore, RN  Safety Promotion/Fall Prevention:   activity supervised   clutter free environment maintained   increase visualization of patient   lighting adjusted   mobility aid in reach   nonskid shoes/slippers when out of bed   room near nurse's station   safety round/check completed  Intervention: Prevent Skin Injury  Recent Flowsheet Documentation  Taken 8/17/2023 2100 by Liliane Moore, RN  Body Position:   position changed independently   sitting up in bed  Taken 8/17/2023 1930 by Liliane Moore, RN  Body Position: supine

## 2023-08-18 NOTE — PROGRESS NOTES
S/p SVT ablation yesterday. A&O x4. R groin site C/D/I. VSS on RA. AVS and Rx reviewed with patient and spouse. Tele: NSR. Reports tolerable mild pain. Discharge to home.

## 2023-08-20 LAB
ATRIAL RATE - MUSE: 82 BPM
DIASTOLIC BLOOD PRESSURE - MUSE: NORMAL MMHG
INTERPRETATION ECG - MUSE: NORMAL
P AXIS - MUSE: 39 DEGREES
PR INTERVAL - MUSE: 142 MS
QRS DURATION - MUSE: 70 MS
QT - MUSE: 364 MS
QTC - MUSE: 425 MS
R AXIS - MUSE: 10 DEGREES
SYSTOLIC BLOOD PRESSURE - MUSE: NORMAL MMHG
T AXIS - MUSE: -36 DEGREES
VENTRICULAR RATE- MUSE: 82 BPM

## 2023-08-23 ENCOUNTER — TELEPHONE (OUTPATIENT)
Dept: CARDIOLOGY | Facility: CLINIC | Age: 53
End: 2023-08-23
Payer: COMMERCIAL

## 2023-08-23 NOTE — TELEPHONE ENCOUNTER
Spoke to [facility] team to let them know that patient only on AC for 30 days and will not have to hold medication as her 30 days will be up prior to procedure.  NOAH Zavaleta

## 2023-08-23 NOTE — TELEPHONE ENCOUNTER
Health Call Center    Phone Message    May a detailed message be left on voicemail: yes     Reason for Call: Order(s): Other:   Reason for requested: Hold and bridge orders for colonoscopy scheduled for 9/21st  Asking for a 2 day hold of Eliquis and if the provider does not want a 2 day hold then please send last creatinine   Date needed: 9/14  Provider name: Dr Macias    Action Taken: Other: Cardiology    Travel Screening: Not Applicable    Thank you!  [facility]

## 2023-09-07 ENCOUNTER — MYC MEDICAL ADVICE (OUTPATIENT)
Dept: CARDIOLOGY | Facility: CLINIC | Age: 53
End: 2023-09-07
Payer: COMMERCIAL

## 2023-09-07 DIAGNOSIS — R00.2 PALPITATIONS: Primary | ICD-10-CM

## 2023-09-10 ENCOUNTER — HEALTH MAINTENANCE LETTER (OUTPATIENT)
Age: 53
End: 2023-09-10

## 2023-09-13 ENCOUNTER — HOSPITAL ENCOUNTER (OUTPATIENT)
Dept: CARDIOLOGY | Facility: CLINIC | Age: 53
Discharge: HOME | End: 2023-09-13
Attending: INTERNAL MEDICINE | Admitting: INTERNAL MEDICINE
Payer: COMMERCIAL

## 2023-09-13 DIAGNOSIS — R00.2 PALPITATIONS: ICD-10-CM

## 2023-09-13 PROCEDURE — 93225 XTRNL ECG REC<48 HRS REC: CPT

## 2023-09-13 PROCEDURE — 93227 XTRNL ECG REC<48 HR R&I: CPT | Performed by: INTERNAL MEDICINE

## 2023-09-19 ENCOUNTER — OFFICE VISIT (OUTPATIENT)
Dept: CARDIOLOGY | Facility: CLINIC | Age: 53
End: 2023-09-19
Payer: COMMERCIAL

## 2023-09-19 VITALS
HEART RATE: 97 BPM | HEIGHT: 70 IN | OXYGEN SATURATION: 98 % | SYSTOLIC BLOOD PRESSURE: 114 MMHG | DIASTOLIC BLOOD PRESSURE: 79 MMHG | BODY MASS INDEX: 42.94 KG/M2

## 2023-09-19 DIAGNOSIS — I47.10 SVT (SUPRAVENTRICULAR TACHYCARDIA) (H): Primary | ICD-10-CM

## 2023-09-19 PROCEDURE — 99215 OFFICE O/P EST HI 40 MIN: CPT | Performed by: NURSE PRACTITIONER

## 2023-09-19 PROCEDURE — 93000 ELECTROCARDIOGRAM COMPLETE: CPT | Performed by: NURSE PRACTITIONER

## 2023-09-19 RX ORDER — DILTIAZEM HYDROCHLORIDE 120 MG/1
120 CAPSULE, EXTENDED RELEASE ORAL DAILY
Qty: 30 CAPSULE | Refills: 11 | Status: SHIPPED | OUTPATIENT
Start: 2023-09-19 | End: 2023-09-25

## 2023-09-19 NOTE — LETTER
9/19/2023    [facility]  [address]  [facility] [address]    RE: Ida Gayle       Dear Colleague,     I had the pleasure of seeing Ida Gayle in the [facility].    Electrophysiology Clinic Progress Note  Ida Gayle MRN# [id number]   YOB: 1970 Age: 53 year old     Primary cardiologist: Dr. Charles (general) and Dr. Macias (EP)    Reason for visit: SVT    History of presenting illness:    Ida Gayle is a pleasant 53 year old patient with past medical history significant for:    Adenosine responsive SVT  Rate premature ventricular contractions (PVCs)  Multiple sclerosis    The patient was evaluated by Dr. Macias on 8/2/2023 due to episodes of narrow complex tachycardia recorded on her iWatch with rates between 180-210 bpm.  In June 2023 she developed an episode of tachycardia that did not terminate.  She felt unwell, lightheaded and presyncopal.  EMS was activated and recorded rapid SVT at approximately 210 bpm that was terminated with 12 mg of adenosine.  It was elected to proceed an EP study and SVT ablation.     The procedure was completed on 8/17/2023.  She was found to have inducible left atrial micro reentrant tachycardia and underwent a successful catheter ablation using a transseptal technique.  The procedure was complicated and took an extended amount of time to map and ablate.  Given the amount of RF lesions in the CARMEN was recommended that she start Eliquis and continue x 4 weeks. She was also kept overnight for observation.     The patient noted that she had more fluttering after the ablation and was recommended that she obtain a Holter monitor.  The monitor did not reveal any recurrent SVT but did show PVCs that are associated with her symptoms with a PVC burden of 1%.    Today she presents for a follow-up company by her .  She describes a fluttering as happening multiple times a day mostly at rest or  after taking a long walk.  We discussed the details of the monitor noting that she did have rare PVCs at 1% that did not always correlate with her symptoms.  Interestingly, after stopping Eliquis the PVCs have improved.    Diagnotic studies:  Holter monitor (9/14/2023): Predominant rhythm was sinus rhythm with average heart rate of 86 bpm, minimum 65 and maximum 141.  There were 180 events marked during fluttering symptoms and it was noted to be sinus rhythm with PVCs.  PVC burden was 1%.          Assessment and Plan:     ASSESSMENT:    Symptomatic SVT and rate PVCs  Adenosine responsive with -210 bpm  S/p challenging but successful catheter ablation of  a left atrial micro reentrant tachycardia. No inducible arrhythmia was noted after ablation therapy.    Place on Eliquis x 4 weeks post procedure due to number of RF ablations  Follow up monitor did not reveal SVT, but showed PVCs with 1% burden    PLAN:     Options discussed with the patient or continuing monitoring of symptoms or starting a daily dose of diltiazem 120 mg daily  At this time I provided the prescription and the patient was able to choose one of the 2 options  She will alert us if there are more concerning symptoms  Otherwise, follow-up in 6 months or sooner if needed       Orders this Visit:  No orders of the defined types were placed in this encounter.    No orders of the defined types were placed in this encounter.    There are no discontinued medications.    Today's clinic visit entailed:  Review of the result(s) of each unique test - EKG, Holter, EPS and ablation   Assessment requiring an independent historian(s) - significant other -   Prescription drug management  40 minutes spent by me on the date of the encounter doing chart review, history and exam, documentation and further activities per the note  Provider  Link to [facility] Help Grid     The level of medical decision making during this visit was of moderate complexity.            Review of Systems:     Review of Systems:  Skin:        Eyes:       ENT:       Respiratory:       Cardiovascular:       Gastroenterology:      Genitourinary:       Musculoskeletal:       Neurologic:       Psychiatric:       Heme/Lymph/Imm:       Endocrine:                 Physical Exam:     Vitals: LMP 08/02/2021 (Approximate)   Constitutional: Well nourished and in no apparent distress.  Eyes: Pupils equal, round. Sclerae anicteric.   HEENT: Normocephalic, atraumatic.   Neck: Supple. JVD   Respiratory: Breathing non-labored. Lungs clear to auscultation bilaterally. No crackles, wheezes, rhonchi, or rales.  Cardiovascular:  Regular rate and rhythm, normal S1 and S2. No murmur, rub, or gallop.  Skin: Warm, dry. No rashes, cyanosis, or xanthelasma.  Extremities: No edema. RFV site CDI  Neurologic: No gross motor deficits. Alert, awake, and oriented to person, place and time.  Psychiatric: Affect appropriate.        CURRENT MEDICATIONS:  Current Outpatient Medications   Medication Sig Dispense Refill    apixaban ANTICOAGULANT (ELIQUIS) 5 MG tablet Take 1 tablet (5 mg) by mouth 2 times daily 60 tablet 0    diazepam (VALIUM) 5 MG tablet TAKE 1-2 TABLETS BY MOUTH ONCE DAILY AS NEEDED FOR ANXIETY      Ocrelizumab (OCREVUS IV)       rizatriptan (MAXALT) 10 MG tablet TAKE 1 TABLET BY MOUTH AT ONSET OF MIGRAINE MAY REPEAT 1 TIME IN 24 HOURS. NOT TO EXCEED 2 IN 24 HOURS      tiZANidine (ZANAFLEX) 4 MG tablet Take 1 tablet (4 mg) by mouth At Bedtime 90 tablet        ALLERGIES  Allergies   Allergen Reactions    Flagyl [Metronidazole] Hives    Miconazole Nitrate Hives     diflucan    Sulfa Antibiotics     Clavulanic Acid Diarrhea         PAST MEDICAL HISTORY:  Past Medical History:   Diagnosis Date    Duplicated ureter, left     Multiple sclerosis (H) 2017    Seizure (H)     As baby       PAST SURGICAL HISTORY:  Past Surgical History:   Procedure Laterality Date    CYSTECTOMY OVARIAN BENIGN  2010    EP ABLATION SVT N/A 8/17/2023     Procedure: Ablation Supraventricular Tachycardia;  Surgeon: David Macias MD;  Location:  HEART CARDIAC CATH LAB    ZZC REIMPLANT URETER,DUPLICATED URETER  1974       FAMILY HISTORY:  Family History   Problem Relation Age of Onset    Prostate Cancer Father     Cancer Maternal Grandmother         skin cancer    Multiple Sclerosis Brother     Multiple Sclerosis Cousin     Diabetes No family hx of     C.A.D. 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[employer]     Employer: SELF   Tobacco Use    Smoking status: Never     Passive exposure: Never    Smokeless tobacco: Never   Substance and Sexual Activity    Alcohol use: Yes     Comment: 1x a month    Drug use: No    Sexual activity: Not Currently     Partners: Male   Other Topics Concern     Service No    Blood Transfusions No    Caffeine Concern No    Occupational Exposure No    Hobby Hazards No    Sleep Concern No    Stress Concern Yes     Comment: second marriage with children issues    Weight Concern Yes    Special Diet No    Exercise Yes     Comment: 2 times per week    Seat Belt Yes               Thank you for allowing me to participate in the care of your patient.      Sincerely,     YANA Hughes [facility]  cc:   No referring provider defined for this encounter.

## 2023-09-19 NOTE — PATIENT INSTRUCTIONS
Today's Recommendations    Consider diltiazem 120 mg daily  Please follow up with Diana in 6 months.    Please send [employer] Scheduling message or call [phone] to the RN team with questions or concerns.     Scheduling and after hours number [phone]    YANA Altamirano, CNP

## 2023-09-19 NOTE — PROGRESS NOTES
Electrophysiology Clinic Progress Note  Ida Gayle MRN# [id number]   YOB: 1970 Age: 53 year old     Primary cardiologist: Dr. Charles (general) and Dr. Macias (EP)    Reason for visit: SVT    History of presenting illness:    Ida Gayle is a pleasant 53 year old patient with past medical history significant for:    Adenosine responsive SVT  Rate premature ventricular contractions (PVCs)  Multiple sclerosis    The patient was evaluated by Dr. Macias on 8/2/2023 due to episodes of narrow complex tachycardia recorded on her iWatch with rates between 180-210 bpm.  In June 2023 she developed an episode of tachycardia that did not terminate.  She felt unwell, lightheaded and presyncopal.  EMS was activated and recorded rapid SVT at approximately 210 bpm that was terminated with 12 mg of adenosine.  It was elected to proceed an EP study and SVT ablation.     The procedure was completed on 8/17/2023.  She was found to have inducible left atrial micro reentrant tachycardia and underwent a successful catheter ablation using a transseptal technique.  The procedure was complicated and took an extended amount of time to map and ablate.  Given the amount of RF lesions in the CARMEN was recommended that she start Eliquis and continue x 4 weeks. She was also kept overnight for observation.     The patient noted that she had more fluttering after the ablation and was recommended that she obtain a Holter monitor.  The monitor did not reveal any recurrent SVT but did show PVCs that are associated with her symptoms with a PVC burden of 1%.    Today she presents for a follow-up company by her .  She describes a fluttering as happening multiple times a day mostly at rest or after taking a long walk.  We discussed the details of the monitor noting that she did have rare PVCs at 1% that did not always correlate with her symptoms.  Interestingly, after stopping Eliquis the PVCs have improved.    Diagnotic  studies:  Holter monitor (9/14/2023): Predominant rhythm was sinus rhythm with average heart rate of 86 bpm, minimum 65 and maximum 141.  There were 180 events marked during fluttering symptoms and it was noted to be sinus rhythm with PVCs.  PVC burden was 1%.          Assessment and Plan:     ASSESSMENT:    Symptomatic SVT and rate PVCs  Adenosine responsive with -210 bpm  S/p challenging but successful catheter ablation of  a left atrial micro reentrant tachycardia. No inducible arrhythmia was noted after ablation therapy.    Place on Eliquis x 4 weeks post procedure due to number of RF ablations  Follow up monitor did not reveal SVT, but showed PVCs with 1% burden    PLAN:     Options discussed with the patient or continuing monitoring of symptoms or starting a daily dose of diltiazem 120 mg daily  At this time I provided the prescription and the patient was able to choose one of the 2 options  She will alert us if there are more concerning symptoms  Otherwise, follow-up in 6 months or sooner if needed       Orders this Visit:  No orders of the defined types were placed in this encounter.    No orders of the defined types were placed in this encounter.    There are no discontinued medications.    Today's clinic visit entailed:  Review of the result(s) of each unique test - EKG, Holter, EPS and ablation   Assessment requiring an independent historian(s) - significant other -   Prescription drug management  40 minutes spent by me on the date of the encounter doing chart review, history and exam, documentation and further activities per the note  Provider  Link to [facility] Help Grid     The level of medical decision making during this visit was of moderate complexity.           Review of Systems:     Review of Systems:  Skin:        Eyes:       ENT:       Respiratory:       Cardiovascular:       Gastroenterology:      Genitourinary:       Musculoskeletal:       Neurologic:       Psychiatric:        Heme/Lymph/Imm:       Endocrine:                 Physical Exam:     Vitals: LMP 08/02/2021 (Approximate)   Constitutional: Well nourished and in no apparent distress.  Eyes: Pupils equal, round. Sclerae anicteric.   HEENT: Normocephalic, atraumatic.   Neck: Supple. JVD   Respiratory: Breathing non-labored. Lungs clear to auscultation bilaterally. No crackles, wheezes, rhonchi, or rales.  Cardiovascular:  Regular rate and rhythm, normal S1 and S2. No murmur, rub, or gallop.  Skin: Warm, dry. No rashes, cyanosis, or xanthelasma.  Extremities: No edema. RFV site CDI  Neurologic: No gross motor deficits. Alert, awake, and oriented to person, place and time.  Psychiatric: Affect appropriate.        CURRENT MEDICATIONS:  Current Outpatient Medications   Medication Sig Dispense Refill    apixaban ANTICOAGULANT (ELIQUIS) 5 MG tablet Take 1 tablet (5 mg) by mouth 2 times daily 60 tablet 0    diazepam (VALIUM) 5 MG tablet TAKE 1-2 TABLETS BY MOUTH ONCE DAILY AS NEEDED FOR ANXIETY      Ocrelizumab (OCREVUS IV)       rizatriptan (MAXALT) 10 MG tablet TAKE 1 TABLET BY MOUTH AT ONSET OF MIGRAINE MAY REPEAT 1 TIME IN 24 HOURS. NOT TO EXCEED 2 IN 24 HOURS      tiZANidine (ZANAFLEX) 4 MG tablet Take 1 tablet (4 mg) by mouth At Bedtime 90 tablet        ALLERGIES  Allergies   Allergen Reactions    Flagyl [Metronidazole] Hives    Miconazole Nitrate Hives     diflucan    Sulfa Antibiotics     Clavulanic Acid Diarrhea         PAST MEDICAL HISTORY:  Past Medical History:   Diagnosis Date    Duplicated ureter, left     Multiple sclerosis (H) 2017    Seizure (H)     As baby       PAST SURGICAL HISTORY:  Past Surgical History:   Procedure Laterality Date    CYSTECTOMY OVARIAN BENIGN  2010    EP ABLATION SVT N/A 8/17/2023    Procedure: Ablation Supraventricular Tachycardia;  Surgeon: David Macias MD;  Location:  HEART CARDIAC CATH LAB    [facility] REIMPLANT URETER,DUPLICATED URETER  1974       FAMILY HISTORY:  Family History    Problem Relation Age of Onset    Prostate Cancer Father     Cancer Maternal Grandmother         skin cancer    Multiple Sclerosis Brother     Multiple Sclerosis Cousin     Diabetes No family hx of     C.A.D. 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weddings     Employer: SELF   Tobacco Use    Smoking status: Never     Passive exposure: Never    Smokeless tobacco: Never   Substance and Sexual Activity    Alcohol use: Yes     Comment: 1x a month    Drug use: No    Sexual activity: Not Currently     Partners: Male   Other Topics Concern     Service No    Blood Transfusions No    Caffeine Concern No    Occupational Exposure No    Hobby Hazards No    Sleep Concern No    Stress Concern Yes     Comment: second marriage with children issues    Weight Concern Yes    Special Diet No    Exercise Yes     Comment: 2 times per week    Seat Belt Yes

## 2023-09-25 ENCOUNTER — TELEPHONE (OUTPATIENT)
Dept: CARDIOLOGY | Facility: CLINIC | Age: 53
End: 2023-09-25
Payer: COMMERCIAL

## 2023-09-25 DIAGNOSIS — I47.10 SVT (SUPRAVENTRICULAR TACHYCARDIA) (H): ICD-10-CM

## 2023-09-25 RX ORDER — DILTIAZEM HYDROCHLORIDE 120 MG/1
120 CAPSULE, EXTENDED RELEASE ORAL DAILY
Qty: 90 CAPSULE | Refills: 3 | Status: SHIPPED | OUTPATIENT
Start: 2023-09-25 | End: 2024-01-04

## 2023-09-25 ASSESSMENT — SLEEP AND FATIGUE QUESTIONNAIRES
HOW LIKELY ARE YOU TO NOD OFF OR FALL ASLEEP WHILE SITTING QUIETLY AFTER LUNCH WITHOUT ALCOHOL: WOULD NEVER DOZE
HOW LIKELY ARE YOU TO NOD OFF OR FALL ASLEEP WHILE LYING DOWN TO REST IN THE AFTERNOON WHEN CIRCUMSTANCES PERMIT: SLIGHT CHANCE OF DOZING
HOW LIKELY ARE YOU TO NOD OFF OR FALL ASLEEP WHILE SITTING INACTIVE IN A PUBLIC PLACE: WOULD NEVER DOZE
HOW LIKELY ARE YOU TO NOD OFF OR FALL ASLEEP WHILE WATCHING TV: WOULD NEVER DOZE
HOW LIKELY ARE YOU TO NOD OFF OR FALL ASLEEP IN A CAR, WHILE STOPPED FOR A FEW MINUTES IN TRAFFIC: WOULD NEVER DOZE
HOW LIKELY ARE YOU TO NOD OFF OR FALL ASLEEP WHEN YOU ARE A PASSENGER IN A CAR FOR AN HOUR WITHOUT A BREAK: WOULD NEVER DOZE
HOW LIKELY ARE YOU TO NOD OFF OR FALL ASLEEP WHILE SITTING AND READING: WOULD NEVER DOZE
HOW LIKELY ARE YOU TO NOD OFF OR FALL ASLEEP WHILE SITTING AND TALKING TO SOMEONE: WOULD NEVER DOZE

## 2023-09-25 NOTE — TELEPHONE ENCOUNTER
[facility] Call Center    Phone Message    May a detailed message be left on voicemail: yes     Reason for Call: Other: Patient called wanting to speak with someone on the care team in regards to some conflicting information they received. Patient was told they can do some walking and mini exercises after their ablation they had from cardiologist, then was told no and to rest their heart for 3-6 months by CON they've seen. Patient is scheduled for first available with Dr. Macias in Feb as they will like to follow up after their ablation to make sure everything is ok. Wanting to know if they can be seen sooner and not with CON. Please call patient back to further discuss and coordinate.     Action Taken: Other: Cardiology    Travel Screening: Not Applicable    Thank you!  [facility]

## 2023-09-25 NOTE — TELEPHONE ENCOUNTER
Spoke to Diana who said that she had said it will take the heart up to 3 months to heal and that she wanted her to ease back into exercise. Pt will also follow up in 6 month. Spoke then to pt and discussed that she was able to go back to walking on the treadmill daily, we just do not like pts to great neelam right away, so that the groin can heal.  Pt states that she is back to walking on treadmill and she notices that she is not having any palpitation while exercising, but when she is done she feels that extra beats and feels that since her ablation she is having more. Pt has had PAC's in the past on a heart monitor on 2019.  Pt aware that they are benign and that that she may have more because of her heart healing, but they still make her a bit nervous.  Pt then commented on her EKG and the Atrial enlargement. This to is not new and let pt know that this was also recorded on an echo in 2019.  Pt then mentioned her weight is the highest it has been and it was discussed that diet and exercise is always good for the heart.  Pt asked about a clinic at [facility].  After a search found the [facility] and this information was give to pt so that she can call.  Did let pt know that if a referral is needed to please let us know. Also asked in a [employer] message if pt wants to keep both the visits in February, because she really only needs one.  Juan Luis

## 2023-09-26 ENCOUNTER — OFFICE VISIT (OUTPATIENT)
Dept: SURGERY | Facility: CLINIC | Age: 53
End: 2023-09-26
Payer: COMMERCIAL

## 2023-09-26 VITALS
OXYGEN SATURATION: 98 % | HEART RATE: 86 BPM | DIASTOLIC BLOOD PRESSURE: 76 MMHG | SYSTOLIC BLOOD PRESSURE: 116 MMHG | WEIGHT: 293 LBS | BODY MASS INDEX: 41.95 KG/M2 | HEIGHT: 70 IN

## 2023-09-26 DIAGNOSIS — E78.00 PURE HYPERCHOLESTEROLEMIA: ICD-10-CM

## 2023-09-26 DIAGNOSIS — E66.01 MORBID OBESITY (H): Primary | ICD-10-CM

## 2023-09-26 PROCEDURE — 99205 OFFICE O/P NEW HI 60 MIN: CPT | Performed by: PHYSICIAN ASSISTANT

## 2023-09-26 RX ORDER — SEMAGLUTIDE 1 MG/.5ML
1 INJECTION, SOLUTION SUBCUTANEOUS
Qty: 2 ML | Refills: 1 | Status: SHIPPED | OUTPATIENT
Start: 2023-09-26 | End: 2024-01-04

## 2023-09-26 RX ORDER — SEMAGLUTIDE 0.5 MG/.5ML
0.5 INJECTION, SOLUTION SUBCUTANEOUS
Qty: 2 ML | Refills: 1 | Status: SHIPPED | OUTPATIENT
Start: 2023-09-26 | End: 2024-01-04

## 2023-09-26 RX ORDER — SEMAGLUTIDE 0.25 MG/.5ML
0.25 INJECTION, SOLUTION SUBCUTANEOUS
Qty: 2 ML | Refills: 0 | Status: SHIPPED | OUTPATIENT
Start: 2023-09-26 | End: 2024-01-04

## 2023-09-26 NOTE — PATIENT INSTRUCTIONS
"Nice to talk with you today! Thank you for allowing me the privilege of caring for you.   We hope we provided you with the excellent service you deserve.     To ensure the quality of our services you may receive a patient satisfaction survey from an independent monitoring company.  The greatest compliment you can give is \"Likely to Recommend\"      Below is our plan we discussed.-  HAL Mix      Increase water to 64 oz water daily  See dietitian  Start Wegovy      Please schedule a follow up with Dominguez Red PA-C in 3 months.  If you need to reach me sooner you can do so by calling [phone].    Have a great day!       WEGOVY (semaglutide)      Wegovy (semaglutide) injection 2.4 mg is an injectable prescription medicine used for adults with obesity (BMI ?30) or overweight (excess weight) (BMI ?27) who also have weight-related medical problems to help them lose weight and keep the weight off.  It is a GLP-1 agonist medication. GLP1 agonists stimulate the hormone GLP-1 in your body o allow you to feel full quickly and stay full longer.    Due to the shortage, You may need to be persistent and patient to get these initial dosages due to the shortage.  Once you are able to obtain the 0.25 and 0.5 mg and 1 mg dose \"12 weeks of therapy\" you can begin treatment.     Directions:  Start Wegovy (semaglutide) 0.25 mg once weekly for 4 weeks, then if tolerating increase to 0.5 mg weekly for 4 weeks, then if tolerating increase to 1 mg weekly for 4 weeks, then if tolerating increase to 1.7 mg weekly for 4 weeks, then if tolerating increase to 2.4 mg weekly thereafter.      -Each Wegovy pen is a once weekly single-dose prefilled pen with a pen injector already built within the pen. Discard the Wegovy pen after use in sharps container.     Common Side Effects:    nausea, headache, diarrhea, stomach upset.  If these become unmanageable call or mychart.    Serious Side effects:   Pancreatitis (inflammation of the pancreas) " has been associated with this type of medication, but is very rare.Symptoms of pancreatitis include: Pain in your upper stomach area which may travel to your back and be worse after eating.     Storage:   Store the prescription in the refrigerator. Once it is time to use the Wegovy pen, you can keep the pen at room temperature and it is good for up to 28 days at room temperature.     How to inject:  For a video on how to use the pen please go to:  https://[email or web address]/about-wegovy/how-to-use-the-wegovy-pen.html#itemTwo       For any questions or concerns please send a [employer] message to our team or call our weight management call center at [phone] during regular business hours. For questions during evenings or weekends your messages will be addressed during the next business day.  For emergencies please call 911 or seek immediate medical care.

## 2023-09-26 NOTE — PROGRESS NOTES
"New Bariatric Surgery/Medical Weight Management Consultation Note        2023      RE: Ida Gayle  MR#: [id number]  : 1970      Referring provider:       9/15/2021     9:23 AM   --   Who referred you Karey King PA-C [facility]       Chief Complaint/Reason for visit: weight concerns    Dear Physicians, [facility] ([facility]),    I had the pleasure of seeing your patient, Ida Gayle, to evaluate her obesity. As you know, Ida Gayle is 53 year old.  She has a height of 5' 9.75\", a weight of 298 lbs 0 oz, and calculated Body mass index is 43.07 kg/m . Here today with .    Diagnosed with MS in 2016. Then started medication that has helped with MS but weight slowly increasing.   Had successful weight loss 2018 through program   Used 3 supplemental bars along with making dinner.    Would like to hold off on bariatric surgery and would like to discuss weight loss medications.         HISTORY OF PRESENT ILLNESS:      2023    12:07 PM   Weight Loss History Reviewed with Patient   How long have you been overweight? From Middle age and beyond   What is the most that you have ever weighed 297   What is the most weight you have lost? 40   I have tried the following methods to lose weight Watching portions or calories    Exercise    Weight Watchers    Atkins type diet (low carb/high protein)    Sonam Layton    Pre packaged meals ex: Nutrisystem    Slimfast   I have tried the following weight loss medications? (Check all that apply) None       CO-MORBIDITIES OF OBESITY INCLUDE:      2023    12:07 PM   --   I have the following health issues associated with obesity None of the above       PAST MEDICAL HISTORY:  Past Medical History:   Diagnosis Date    Duplicated ureter, left     Multiple sclerosis (H) 2017    Seizure (H)     As baby       PAST SURGICAL HISTORY:  Past Surgical History:   Procedure Laterality Date    CYSTECTOMY OVARIAN BENIGN  " 2010    EP ABLATION SVT N/A 8/17/2023    Procedure: Ablation Supraventricular Tachycardia;  Surgeon: David Macias MD;  Location:  HEART CARDIAC CATH LAB    [facility] REIMPLANT URETER,DUPLICATED URETER  1974       FAMILY HISTORY:   Family History   Problem Relation Age of Onset    Prostate Cancer Father     Cancer Maternal Grandmother         skin cancer    Multiple Sclerosis Brother     Multiple Sclerosis Cousin     Diabetes No family hx of     C.A.D. 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 Yes   Who can you count on for support throughout your weight loss surgery journey?  / children     D:  Up at 7 am  B: 7:30-8:00 had 2 pieces of low carb toast with low fat cottage cheese and black coffee. 2 cups  L: 11:30 Progresso Healthy soup - half can. Half [place] with one slice of ham and half of american cheese slice. Had a diet ginger ale  Snacks: 2 rice cakes with cottage cheese  D:Around 4:30 pm Last night ate out. Had 2 pieces of chobata bread with parmesan shrimp - more like appetizer than meal   After diinner snack had a few slices of cheese and crackers  Fluids: 16 oz water, 2 cups of coffee in AM, starbucks tall brown sugar Expresso, 1-2 cans of ginger ale. Not a lot of alcohol     HABITS:      9/25/2023    12:07 PM   --   How often do you drink alcohol? Monthly or less   If you do drink alcohol, how many drinks might you have in a day? (one drink = 5 oz. wine, 1 can/bottle of beer, 1 shot liquor) 1 or 2   Do you currently use any of the  following Nicotine products? No   Have you ever used any of the following nicotine products? No   Have you or are you currently using street drugs or prescription strength medication for which you do not have a prescription for? No   Do you have a history of chemical dependency (alcohol or drug abuse)? No       PSYCHOLOGICAL HISTORY:       9/25/2023    12:07 PM   Psychological History Reviewed With Patient   Have you ever attempted suicide? Never.   Have you had thoughts of suicide in the past year? No   Have you ever been hospitalized for mental illness or a suicide attempt? Never.   Do you have a history of chronic pain? No   Have you ever been diagnosed with fibromyalgia? No   Are you currently being treated for any of the following? (select all that apply) Anxiety   Are you currently seeing a therapist or counselor? Yes   Are you currently seeing a psychiatrist? Yes       ROS:      9/25/2023    12:07 PM   --   Skin Varicose veins    None of the above   HEENT None of these   Musculoskeletal Joint Pain   Cardiovascular Heart murmurs    None of the above   Pulmonary None of the above   Gastrointestinal None of the above   Genitourinary None of the above   Hematological None of the above   Neurological Migraine headaches   Female only None of the above     ROS  General  Fatigue: sometimes from MS  Sleep Quality: OK  Birth Control: hysterectomy  HEENT  Hx of glaucoma: No  Vision changes: No  Cardiovascular  Chest Pain with Exertion: No  Palpitations: No  Hx of heart disease: yes - Just had ablation due to SVT.    Hx of PVD No  Pulmonary  Shortness of breath at rest: No  Shortness of breath with exertion: No  Snoring: No - just had sleep study - NO ERIC  Gastrointestinal  Heartburn: No  Abdominal pain: No  History of pancreatitis: No  Severe constipation: No  Hx of bowel obstruction: No  Gallbladder disease: No  Gastrourinary  History of kidney stones: No  Psychiatric  Moods Stable: Yes  History of drug abuse: No  No  "history of eating disorder  Endocrine  Polydipsia: No  Polyuria: No  Personal or family history of thyroid cancer: No  Neurologic:  Hx of seizures: No  Hx of paresthesias: Yes due to MS      EATING BEHAVIORS:      9/25/2023    12:07 PM   --   Have you or anyone else thought that you had an eating disorder? No   Do you currently binge eat (eat a large amount of food in a short time)? No   Are you an emotional eater? No   Do you get up to eat after falling asleep? No   Can you afford 3 meals a day? Yes   Can you afford 50-60 dollars a month for vitamins? Yes       EXERCISE:      9/25/2023    12:07 PM   --   How often do you exercise? 1 to 2 times per week   What is the duration of your exercise (in minutes)? 30 Minutes   What types of exercise do you do? walking    other   What keeps you from being more active? I am as active as I can possbily be    Pain     Just had cardiac ablation so will have to take it easy for a while. Will get back to going to the gym    MEDICATIONS:  Current Outpatient Medications   Medication Sig Dispense Refill    Ocrelizumab (OCREVUS IV)       tiZANidine (ZANAFLEX) 4 MG tablet Take 1 tablet (4 mg) by mouth At Bedtime 90 tablet     diazepam (VALIUM) 5 MG tablet TAKE 1-2 TABLETS BY MOUTH ONCE DAILY AS NEEDED FOR ANXIETY (Patient not taking: Reported on 9/26/2023)      diltiazem ER (TIAZAC) 120 MG 24 hr ER beaded capsule Take 1 capsule (120 mg) by mouth daily (Patient not taking: Reported on 9/26/2023) 90 capsule 3    rizatriptan (MAXALT) 10 MG tablet TAKE 1 TABLET BY MOUTH AT ONSET OF MIGRAINE MAY REPEAT 1 TIME IN 24 HOURS. NOT TO EXCEED 2 IN 24 HOURS (Patient not taking: Reported on 9/26/2023)         ALLERGIES:  Allergies   Allergen Reactions    Flagyl [Metronidazole] Hives    Miconazole Nitrate Hives     diflucan    Sulfa Antibiotics     Clavulanic Acid Diarrhea       LABS/IMAGING/MEDICAL RECORDS REVIEW: [facility]    PHYSICAL EXAM:  /76   Pulse 86   Ht 5' 9.75\" (1.772 m)   Wt 298 lb " "(135.2 kg)   LMP 08/02/2021 (Approximate)   SpO2 98%   BMI 43.07 kg/m    GENERAL: Healthy, alert and no distress  EYES: Eyes grossly normal to inspection.  No discharge or erythema, or obvious scleral/conjunctival abnormalities.  RESP: No audible wheeze, cough, or visible cyanosis.  No visible retractions or increased work of breathing.    SKIN: Visible skin clear. No significant rash, abnormal pigmentation or lesions.  NEURO: Cranial nerves grossly intact.  Mentation and speech appropriate for age.  PSYCH: Mentation appears normal, affect normal/bright, judgement and insight intact, normal speech and appearance well-groomed.      ASSESSMENT/PLAN:  Morbid Obesity    We discussed the role of pharmacological agents in the treatment of obesity and the \"off-label\" use of medications in this practice. We discussed the risks and benefits of each. We discussed indications, contraindications, potential side effects, and estimated costs of each. Discussed that medications must always be used together with lifestyle changes such as improvements in diet choices, portion control and establishing and maintaining a regular exercise program.     Wegovy prescribed. Patient information provided. Discussed in detail. Patient aware of supply issues and is willing to wait. Hemoglobin A1C also ordered.     Patient has no history of pancreatitis. Patient has no personal or family history of medullary thyroid carcinoma or MEN2.     Not a candidate for phentermine due to heart disease.   Topiramate - on several medications that cause CNS depression    1. Get in 60 + oz water daily   2. Meet with dietitian      Sincerely,     Dominguez Red PA-C    60 minutes spent on the date of the encounter doing chart review, review of test results, patient visit and documentation     "

## 2023-09-27 DIAGNOSIS — E66.01 MORBID OBESITY (H): Primary | ICD-10-CM

## 2023-09-27 LAB — HBA1C MFR BLD: 5.2 % (ref 4–7)

## 2023-09-27 PROCEDURE — 83036 HEMOGLOBIN GLYCOSYLATED A1C: CPT | Performed by: FAMILY MEDICINE

## 2023-09-27 PROCEDURE — 36415 COLL VENOUS BLD VENIPUNCTURE: CPT | Performed by: FAMILY MEDICINE

## 2023-10-17 DIAGNOSIS — E66.01 MORBID OBESITY (H): Primary | ICD-10-CM

## 2023-10-17 RX ORDER — BUPROPION HYDROCHLORIDE 100 MG/1
TABLET, EXTENDED RELEASE ORAL
Qty: 60 TABLET | Refills: 1 | Status: SHIPPED | OUTPATIENT
Start: 2023-10-17 | End: 2024-01-04

## 2023-10-26 DIAGNOSIS — E88.819 INSULIN RESISTANCE: ICD-10-CM

## 2023-10-26 DIAGNOSIS — E66.01 MORBID OBESITY (H): Primary | ICD-10-CM

## 2023-10-26 RX ORDER — METFORMIN HCL 500 MG
TABLET, EXTENDED RELEASE 24 HR ORAL
Qty: 180 TABLET | Refills: 0 | Status: SHIPPED | OUTPATIENT
Start: 2023-10-26 | End: 2024-01-16

## 2023-11-01 ENCOUNTER — HOSPITAL ENCOUNTER (EMERGENCY)
Facility: CLINIC | Age: 53
Discharge: HOME | End: 2023-11-01
Attending: EMERGENCY MEDICINE | Admitting: EMERGENCY MEDICINE
Payer: COMMERCIAL

## 2023-11-01 VITALS
DIASTOLIC BLOOD PRESSURE: 89 MMHG | RESPIRATION RATE: 18 BRPM | SYSTOLIC BLOOD PRESSURE: 147 MMHG | TEMPERATURE: 97.3 F | HEART RATE: 100 BPM | OXYGEN SATURATION: 99 %

## 2023-11-01 DIAGNOSIS — S61.211A LACERATION OF LEFT INDEX FINGER WITHOUT FOREIGN BODY WITHOUT DAMAGE TO NAIL, INITIAL ENCOUNTER: ICD-10-CM

## 2023-11-01 DIAGNOSIS — S61.213A LACERATION OF LEFT MIDDLE FINGER WITHOUT FOREIGN BODY WITHOUT DAMAGE TO NAIL, INITIAL ENCOUNTER: ICD-10-CM

## 2023-11-01 PROCEDURE — 12002 RPR S/N/AX/GEN/TRNK2.6-7.5CM: CPT

## 2023-11-01 PROCEDURE — 99283 EMERGENCY DEPT VISIT LOW MDM: CPT

## 2023-11-01 ASSESSMENT — ACTIVITIES OF DAILY LIVING (ADL): ADLS_ACUITY_SCORE: 33

## 2023-11-01 NOTE — DISCHARGE INSTRUCTIONS
Please follow-up with your primary care provider in 10 to 14 days for suture removal.  If you develop signs or symptoms of infection including redness, swelling, drainage, increased pain, please return to ER.    Discharge Instructions  Laceration (Cut)    You were seen today for a laceration (cut).  Your provider examined your laceration for any problems such a buried foreign body (like glass, a splinter, or gravel), or injury to blood vessels, tendons, and nerves.  Your provider may have also rinsed and/or scrubbed your laceration to help prevent an infection. It may not be possible to find all problems with your laceration on the first visit; occasionally foreign bodies or a tendon injury can go undetected.    Your laceration may have been closed in one of several ways:  No closure: many wounds will heal just fine without closure.  Stitches: regular stitches that require removal.  Staples: skin staples are often used in the scalp/head.  Wound adhesive (glue): skin glue can be used for certain lacerations and doesn t require removal.  Wound strips (aka Butterfly bandages or steri-strips): these are bandages that help to close a wound.  Absorbable stitches:  dissolving  stitches that go away on their own and usually don t require removal.    A small percentage of wounds will develop an infection regardless of how well the wound is cared for. Antibiotics are generally not indicated to prevent an infection so are only given for a small number of high-risk wounds. Some lacerations are too high risk to close, and are left open to heal because closure can increase the likelihood that an infection will develop.    Remember that all lacerations, no matter how expertly repaired, will cause scarring. We consider many factors, techniques, and materials, in our efforts to provide the best possible cosmetic outcome.    Generally, every Emergency Department visit should have a follow-up clinic visit with either a primary or a  specialty clinic/provider. Please follow-up as instructed by your emergency provider today.     Return to the Emergency Department right away if:  You have more redness, swelling, pain, drainage (pus), a bad smell, or red streaking from your laceration as these symptoms could indicate an infection.  You have a fever of 100.4 F or more.  You have bleeding that you cannot stop at home. If your cut starts to bleed, hold pressure on the bleeding area with a clean cloth or put pressure over the bandage.  If the bleeding does not stop after using constant pressure for 30 minutes, you should return to the Emergency Department for further treatment.  An area past the laceration is cool, pale, or blue compared with the other side, or has a slower return of color when squeezed.  Your dressing seems too tight or starts to get uncomfortable or painful. For children, signs of a problem might be irritability or restlessness.  You have loss of normal function or use of an area, such as being unable to straighten or bend a finger normally.  You have a numb area past the laceration.    Return to the Emergency Department or see your regular provider if:  The laceration starts to come open.   You have something coming out of the cut or a feeling that there is something in the laceration.  Your wound will not heal, or keeps breaking open. There can always be glass, wood, dirt or other things in any wound.  They will not always show up, even on x-rays.  If a wound does not heal, this may be why, and it is important to follow-up with your regular provider.    Home Care:  Take your dressing off in 12-24 hours, or as instructed by your provider, to check your laceration. Remove the dressing sooner if it seems too tight or painful, or if it is getting numb, tingly, or pale past the dressing.  Gently wash your laceration 1-2 times daily with clean water and mild soap. It is okay to shower or run clean water over the laceration, but do not  let the laceration soak in water (no swimming).  If your laceration was closed with wound adhesive or strips: pat it dry and leave it open to the air. For all other repairs: after you wash your laceration, or at least 2 times a day, apply antibiotic ointment (such as Neosporin  or Bacitracin ) to the laceration, then cover it with a Band-Aid  or gauze.  Keep the laceration clean. Wear gloves or other protective clothing if you are around dirt.    Follow-up for removal:  If your wound was closed with staples or regular stitches, they need to be removed according to the instructions and timeline specified by your provider today.  If your wound was closed with absorbable ( dissolving ) sutures, they should fall out, dissolve, or not be visible in about one week. If they are still visible, then they should be removed according to the instructions and timeline specified by your provider today.    Scars:  To help minimize scarring:  Wear sunscreen over the healed laceration when out in the sun.  Massage the area regularly once healed.  You may apply Vitamin E to the healed wound.  Wait. Scars improve in appearance over months and years.    If you were given a prescription for medicine here today, be sure to read all of the information (including the package insert) that comes with your prescription.  This will include important information about the medicine, its side effects, and any warnings that you need to know about.  The pharmacist who fills the prescription can provide more information and answer questions you may have about the medicine.  If you have questions or concerns that the pharmacist cannot address, please call or return to the Emergency Department.       Remember that you can always come back to the Emergency Department if you are not able to see your regular provider in the amount of time listed above, if you get any new symptoms, or if there is anything that worries you.

## 2023-11-01 NOTE — ED PROVIDER NOTES
History     Chief Complaint:  Laceration       HPI   Ida Gayle is a 53 year old female who presents with lacerations to left hand.  Patient states that she was trying to use an electric  to trim the plantar front yard when the light slipped and cut her left hand.  Patient states that she is up-to-date on her tetanus.  Endorses significant pain to the left first and second digit with lacerations to the second digit DIP and third digit MIP.      Independent Historian:   None - Patient Only    Review of External Notes:   Reviewed [facility] - tdap 2022       Medications:    buPROPion (WELLBUTRIN SR) 100 MG 12 hr tablet  diazepam (VALIUM) 5 MG tablet  diltiazem ER (TIAZAC) 120 MG 24 hr ER beaded capsule  metFORMIN (GLUCOPHAGE XR) 500 MG 24 hr tablet  Ocrelizumab (OCREVUS IV)  rizatriptan (MAXALT) 10 MG tablet  Semaglutide-Weight Management (WEGOVY) 0.25 MG/0.5ML pen  Semaglutide-Weight Management (WEGOVY) 0.5 MG/0.5ML pen  Semaglutide-Weight Management (WEGOVY) 1 MG/0.5ML pen  tiZANidine (ZANAFLEX) 4 MG tablet        Past Medical History:    Past Medical History:   Diagnosis Date    Duplicated ureter, left     Multiple sclerosis (H) 2017    Seizure (H)        Past Surgical History:    Past Surgical History:   Procedure Laterality Date    CYSTECTOMY OVARIAN BENIGN  2010    EP ABLATION SVT N/A 8/17/2023    Procedure: Ablation Supraventricular Tachycardia;  Surgeon: David Macias MD;  Location:  HEART CARDIAC CATH LAB    [facility] REIMPLANT URETER,DUPLICATED URETER  1974        Physical Exam   Patient Vitals for the past 24 hrs:   BP Temp Temp src Pulse Resp SpO2   11/01/23 1737 (!) 147/89 97.3  F (36.3  C) Temporal 100 18 99 %        Physical Exam  General: Alert and cooperative with exam. Normal mentation.  Head:  Scalp is NC/AT  Eyes:  No scleral icterus, PERRL  ENT:  The external nose and ears are normal.  MS:  Full range of motion of left hand digits.  Able to fully flex and extend first  and second digit against resistance.  Skin:  Lacerations noted to left hand first and second digits  Neuro:  Oriented x 3. No gross motor deficits.        Emergency Department Course   Imaging:  No orders to display        Laboratory:  Labs Ordered and Resulted from Time of ED Arrival to Time of ED Departure - No data to display     Procedures     Laceration Repair      Procedure: Laceration Repair    Indication: Laceration    Consent: Verbal    Location: Left L third (middle) finger and L second (index) finger    Length: 3 cm    Preparation: Irrigation with Sterile Saline and Pressure device utilized.    Anesthesia/Sedation: Lidocaine - 1%      Treatment/Exploration: Wound explored, no foreign bodies found     Closure: The wound was closed with one layer. Skin/superficial layer was closed with 11 x 5-0 Nylon using Interrupted sutures.     Patient Status: The patient tolerated the procedure well: Yes. There were no complications.    Emergency Department Course & Assessments:      Interventions:  Medications - No data to display       Independent Interpretation (X-rays, CTs, rhythm strip):  None    Consultations/Discussion of Management or Tests:  None        Social Determinants of Health affecting care:   None    Disposition:  The patient was discharged to home.     Impression & Plan      Medical Decision Making:  Ida Gayle is a 53 year old female presents for evaluation of a laceration to left hand first and second digits as sustained as noted in the HPI. After anesthesia and copious irrigation, the wound was carefully evaluated and explored. There was no foreign body identified. CMS is intact.  There is no evidence of muscular, tendon, bone, or nerve damage with this laceration. The laceration was closed as noted in the procedure note. The patient tolerated the procedure well and there were no immediate complications.  Possible complications (infection, scarring) were reviewed with the patient.  Appropriate wound dressing was placed and daily cares were discussed. Tetanus is up to date. she will be discharged home. she was asked to follow up with primary care in 10-14 days for suture removal. Red flag symptoms, and reasons for return were discussed and understood. All questions were answered prior to discharge. The patient understands and agrees to this plan.      Diagnosis:    ICD-10-CM    1. Laceration of left index finger without foreign body without damage to nail, initial encounter  S61.211A       2. Laceration of left middle finger without foreign body without damage to nail, initial encounter  S61.213A            Discharge Medications:  New Prescriptions    No medications on file          11/1/2023   HAL Merchant Lauren R, PA-C  11/01/23 1856

## 2023-11-01 NOTE — ED TRIAGE NOTES
Pt with laceration to L hand, second and third digit with . Bleeding controlled. Not on blood thinners. ABC intact.

## 2023-11-01 NOTE — ED NOTES
Emergency Department Technician Wound Irrigation Note:    11/1/2023    6:25 PM      Wound location:  Left 1st and 2nd finger    Irrigation Fluid: Normal Saline    Estimated Irrigation Volume (60 mL fluid per cm): 1L    Yadira Ruby

## 2023-11-02 NOTE — ED PROVIDER NOTES
Emergency Department Attending Supervision Note  11/1/2023  9:14 PM      I evaluated this patient in conjunction with Bev Farrell PA-C      53 old female presents with laceration to her left hand.  She returned home from dinner.  She was going to use an electric hedge tremor a plant and a 5 yard when the guard slipped and cut her left hand.  Her pain is most significant to the second and third digits.  She denies any numbness or weakness.  Tetanus immunization was last in 2022.      On my exam,       EYES:    Conjunctiva normal.  NECK:    Supple, no meningismus.   CV:     Regular rate and rhythm     No murmurs, rubs or gallops.       2+ radial pulses bilateral.  PULM:    Clear to auscultation bilateral.       No respiratory distress.      No wheezing, rales or stridor.  ABD:    Soft, non-tender, non-distended.       No rebound or guarding.  MSK:     Left index finger: no rotational deformity.      FDS, FDP and extensor tendon intact.      No subungual hematoma or nail injury.      No focal bony tenderness      Superficial linear laceration to the palmar aspect of the finger     Left middle finger:      FDS, FDP and extensor tendon intact.      No subungual hematoma or nail injury.      No focal bony tenderness      Superficial linear laceration to the palmar aspect of the finger  LYMPH:   No cervical lymphadenopathy.  NEURO:   Left index and middle finger: sensation intact.  SKIN:    Warm, dry   PSYCH:    Mood is good and affect is appropriate.      53-year-old female presents with lacerations to her left index and middle finger.  No evidence of deep structure involvement.  Lacerations were repaired as documented per HAL note.  Tetanus up-to-date.  General wound care instructions given.  Sutures out in 5-10 days.      Diagnosis    (S61.211A) Laceration of left index finger without foreign body without damage to nail, initial encounter      (S61.213A) Laceration of left middle finger without foreign body without  damage to nail, initial encounter        MD Jeffry Gilbert Jeremiah R, MD  11/01/23 [id number]

## 2023-11-19 ENCOUNTER — HEALTH MAINTENANCE LETTER (OUTPATIENT)
Age: 53
End: 2023-11-19

## 2023-12-06 ENCOUNTER — TELEPHONE (OUTPATIENT)
Dept: SURGERY | Facility: CLINIC | Age: 53
End: 2023-12-06
Payer: COMMERCIAL

## 2023-12-06 NOTE — TELEPHONE ENCOUNTER
Reason for Call:  Medication or medication refill:    Do you use a [facility]?  Name of the pharmacy and phone number for the current request:                Pharmacy: [id number] [place], [place] - [address] AT [place] [address] & [facility] (Ph: [phone])         Name of the medication requested: Metformin    Other request: patient scheduled follow up, requesting refill    Can we leave a detailed message on this number? YES    Phone number patient can be reached at: Cell number on file:    Telephone Information:   Mobile [phone]       Best Time: any    Call taken on 12/6/2023 at 9:18 AM by Anna Rob

## 2023-12-13 DIAGNOSIS — E66.01 MORBID OBESITY (H): ICD-10-CM

## 2023-12-13 RX ORDER — BUPROPION HYDROCHLORIDE 100 MG/1
TABLET, EXTENDED RELEASE ORAL
Qty: 60 TABLET | Refills: 1 | OUTPATIENT
Start: 2023-12-13

## 2023-12-13 NOTE — TELEPHONE ENCOUNTER
Wellbutrin SR refused - pt didn't tolerate d/t SE 10/23.  This was done per clinic protocol.  Cindy Hamilton MS, RD, RN

## 2024-01-03 NOTE — PROGRESS NOTES
Assessment & Plan     Acute bacterial sinusitis-symptoms consistent with bacterial sinusitis, will treat and await improvement. Pt gets yeast infections with AB use, fluconazole given  -Rest, increase fluids, honey for cough suppression/sore throat  -Add Mucinex and Sudafed D for symptomatic relief  -Ibuprofen/Tylenol as needed for symptomatic relief  Seek emergency care for significant shortness of breath, chest pain, and/or fever >103F that cannot be controlled with antipyretics     - fluconazole (DIFLUCAN) 150 MG tablet  Dispense: 1 tablet; Refill: 0  - amoxicillin-clavulanate (AUGMENTIN) 875-125 MG tablet  Dispense: 14 tablet; Refill: 0        Follow up as needed    No follow-ups on file.    Octavio Muller, HAL  [facility]    Subjective   Ida is a 53 year old, presenting for the following health issues:  Sinus Problem (Cold sx began two weeks ago, started to feel a little better a few day later and then woke up with severe sinus pressure and headache now for over a week/Rosa pot BID, Tylenol sinus which relieves slightly but still has a headache/No cough, sore throat/1/9/23 has Ocrevus injection for MS)    HPI     Negative COVID test at home.  Had URI symptoms 2 weeks ago, got better and then got worse with sinus pressure/pain and headaches  Did well with augmentin with sinus infection last year.     Acute Illness  Acute illness concerns: Sinus pressure/pain  Onset/Duration: 14 days  Symptoms:  Fever: No  Chills/Sweats: No  Headache (location?): YES  Sinus Pressure: YES  Conjunctivitis:  No  Ear Pain: no  Rhinorrhea: YES  Congestion: YES  Sore Throat: No  Cough: no  Wheeze: No  Decreased Appetite: No  Nausea: No  Vomiting: No  Diarrhea: No  Dysuria/Freq.: No  Dysuria or Hematuria: No  Fatigue/Achiness: No  Sick/Strep Exposure: YES  Therapies tried and outcome: Zicam, Neti pot, Tylenol Sinus      Review of Systems   Constitutional, HEENT, cardiovascular, pulmonary, gi and gu systems are  "negative, except as otherwise noted.      Objective    BP (!) 134/94 (BP Location: Right arm, Patient Position: Sitting, Cuff Size: Adult Large)   Pulse 74   Temp 97.3  F (36.3  C) (Temporal)   Ht 1.772 m (5' 9.75\")   Wt 133.4 kg (294 lb)   LMP 08/02/2021 (Approximate)   SpO2 99%   BMI 42.49 kg/m    Body mass index is 42.49 kg/m .  Physical Exam   GENERAL: healthy, alert and no distress  HENT: normal cephalic/atraumatic, both ears: clear effusion, nose and mouth without ulcers or lesions, oropharynx clear, oral mucous membranes moist, and sinuses: maxillary, frontal tenderness on both sides  NECK: no adenopathy, no asymmetry, masses, or scars and thyroid normal to palpation  RESP: lungs clear to auscultation - no rales, rhonchi or wheezes  CV: regular rate and rhythm, normal S1 S2, no S3 or S4, no murmur, click or rub, no peripheral edema and peripheral pulses strong  ABDOMEN: soft, nontender, no hepatosplenomegaly, no masses and bowel sounds normal  MS: no gross musculoskeletal defects noted, no edema  NEURO: Normal strength and tone, mentation intact and speech normal  PSYCH: mentation appears normal, affect normal/bright                      "

## 2024-01-04 ENCOUNTER — OFFICE VISIT (OUTPATIENT)
Dept: FAMILY MEDICINE | Facility: CLINIC | Age: 54
End: 2024-01-04

## 2024-01-04 VITALS
BODY MASS INDEX: 41.95 KG/M2 | SYSTOLIC BLOOD PRESSURE: 134 MMHG | OXYGEN SATURATION: 99 % | WEIGHT: 293 LBS | DIASTOLIC BLOOD PRESSURE: 94 MMHG | HEIGHT: 70 IN | HEART RATE: 74 BPM | TEMPERATURE: 97.3 F

## 2024-01-04 DIAGNOSIS — B96.89 ACUTE BACTERIAL SINUSITIS: Primary | ICD-10-CM

## 2024-01-04 DIAGNOSIS — J01.90 ACUTE BACTERIAL SINUSITIS: Primary | ICD-10-CM

## 2024-01-04 PROCEDURE — 99213 OFFICE O/P EST LOW 20 MIN: CPT | Performed by: PHYSICIAN ASSISTANT

## 2024-01-04 RX ORDER — FLUCONAZOLE 150 MG/1
150 TABLET ORAL ONCE
Qty: 1 TABLET | Refills: 0 | Status: SHIPPED | OUTPATIENT
Start: 2024-01-04 | End: 2024-01-04

## 2024-01-04 RX ORDER — ERGOCALCIFEROL 1.25 MG/1
50000 CAPSULE, LIQUID FILLED ORAL WEEKLY
COMMUNITY
Start: 2023-11-26

## 2024-01-04 NOTE — NURSING NOTE
Chief Complaint   Patient presents with    Sinus Problem     Cold sx began two weeks ago, started to feel a little better a few day later and then woke up with severe sinus pressure and headache now for over a week  Rosa pot BID, Tylenol sinus which relieves slightly but still has a headache  No cough, sore throat  1/9/23 has Ocrevus injection for MS         Pre-visit Screening:  Immunizations:  up to date  Colonoscopy:  is up to date  Mammogram: is due and to be scheduled  Asthma Action Test/Plan:  NA  PHQ9:  NA  GAD7:  NA  Questioned patient about current smoking habits Pt. has never smoked.  Ok to leave detailed message on voice mail for today's visit only Yes, phone # [phone]

## 2024-01-15 DIAGNOSIS — E88.819 INSULIN RESISTANCE: ICD-10-CM

## 2024-01-15 DIAGNOSIS — E66.01 MORBID OBESITY (H): ICD-10-CM

## 2024-01-15 NOTE — TELEPHONE ENCOUNTER
"Per  msg from provider 12/7: \"I looked at your chart and I sent over a 3 month supply of metformin on October 26 that should last until end of January 2024. My nurse did say there is a good chance you can get in to be seen in January and to have you call around the beginning of the year. I am ok extending a script an extra 30 days but no longer without seeing you and seeing how things are going as there are side effects and labs to order.\"    Metformin refill request- next appt 3/14/24.    Will send message to offer sooner appt as  states would only send an additional 30 days.    Elda Suggs RN    "

## 2024-01-16 ENCOUNTER — MYC MEDICAL ADVICE (OUTPATIENT)
Dept: FAMILY MEDICINE | Facility: CLINIC | Age: 54
End: 2024-01-16

## 2024-01-16 DIAGNOSIS — B96.89 ACUTE BACTERIAL SINUSITIS: Primary | ICD-10-CM

## 2024-01-16 DIAGNOSIS — J01.90 ACUTE BACTERIAL SINUSITIS: Primary | ICD-10-CM

## 2024-01-16 RX ORDER — METFORMIN HCL 500 MG
TABLET, EXTENDED RELEASE 24 HR ORAL
Qty: 60 TABLET | Refills: 0 | Status: SHIPPED | OUTPATIENT
Start: 2024-01-16

## 2024-01-16 NOTE — TELEPHONE ENCOUNTER
Ida Gayle called the clinic support line with the following:    States that that she has sinus drainage, HA ans pressure. Did get her Ocrevus injection 4 days ago, not sure if this effects the antibiotic and her immune system

## 2024-01-17 RX ORDER — FLUCONAZOLE 150 MG/1
150 TABLET ORAL ONCE
Qty: 1 TABLET | Refills: 0 | Status: SHIPPED | OUTPATIENT
Start: 2024-01-17 | End: 2024-01-17

## 2024-01-17 RX ORDER — LEVOFLOXACIN 750 MG/1
750 TABLET, FILM COATED ORAL DAILY
Qty: 7 TABLET | Refills: 0 | Status: SHIPPED | OUTPATIENT
Start: 2024-01-17 | End: 2024-01-23

## 2024-01-17 NOTE — TELEPHONE ENCOUNTER
Will treat as if AB failure, Levaquin for 7 days  Octavio Muller PA-C  [facility]

## 2024-01-23 ENCOUNTER — OFFICE VISIT (OUTPATIENT)
Dept: SURGERY | Facility: CLINIC | Age: 54
End: 2024-01-23
Payer: COMMERCIAL

## 2024-01-23 VITALS
WEIGHT: 293 LBS | DIASTOLIC BLOOD PRESSURE: 93 MMHG | OXYGEN SATURATION: 99 % | HEART RATE: 97 BPM | SYSTOLIC BLOOD PRESSURE: 136 MMHG | BODY MASS INDEX: 41.95 KG/M2 | HEIGHT: 70 IN

## 2024-01-23 DIAGNOSIS — E78.00 PURE HYPERCHOLESTEROLEMIA: ICD-10-CM

## 2024-01-23 DIAGNOSIS — E66.01 MORBID OBESITY (H): Primary | ICD-10-CM

## 2024-01-23 PROCEDURE — 99214 OFFICE O/P EST MOD 30 MIN: CPT | Performed by: PHYSICIAN ASSISTANT

## 2024-01-23 NOTE — PATIENT INSTRUCTIONS
"Nice to talk with you today! Thank you for allowing me the privilege of caring for you.   We hope we provided you with the excellent service you deserve.     To ensure the quality of our services you may receive a patient satisfaction survey from an independent monitoring company.  The greatest compliment you can give is \"Likely to Recommend\"      Below is our plan we discussed.-  HAL Mix      Plan:  Stop the metformin  RX for Zepbound sent to pharmacy     Please call [phone] and schedule a follow up with Dominguez Red PA-C in 3 months.  If you need to reach me sooner you can do so by calling [phone].    Have a great day!       Zepbound (Tirzepatide)    Zepbound (Tirzepatide): is an injectable prescription medicine recently FDA approved for adults with obesity or overweight with an additional condition affected by their weight.      It is given as a shot once a week. It activates the body's receptors for GIP (glucose-dependent insulinotropic polypeptide) and GLP-1 (glucagon-like peptide-1) receptor, two naturally occurring hormones that help tell the brain that you are full. It also works is by slowing down how quickly food leaves your stomach.     You will start to feel mena more quickly, notice portion changes and eat less often between meals.  Patients are advised to eat slowly and purposefully. Give yourself less portions. You may find after starting this medication you have a new point of fullness. Maintain consistent eating schedule with meals +/- snacks and get in good hydration.    Dosing:  Initial dose: 2.5 mg injected subcutaneously once weekly for 4 weeks  Further dose changes can include: increase to 5 mg once weekly for 4 weeks, then 7.5 mg once weekly for 4 weeks, then 10 mg once weekly for 4 weeks then 12.5 mg once weekly for 4 weeks, then 15 mg (maximum dose) once weekly.    Dose changes are based on how you are tolerating the current dose, what benefits you are seeing at the current " dose and if improvement in effectiveness of the drug is needed. The goal is to find the dose that works best for you with the least amount of side effects. You may find you reach this at a lower dose than the maximum dose.     Side effects: Common side effects include nausea, Heartburn,diarrhea, constipation. This is worse when starting the medication and with dose dose escalation.  It does improve with time. Stay adequately hydrated and eat small portions to decrease the risk of side effects.     The risk of pancreatitis (inflammation of the pancreas) has been associated with this type of medication, but is very rare.  If you have had pancreatitis in the past, this medication may not be for you. If you experience persistent severe abdominal pain (sometimes radiating to the back potentially accompanied by vomiting and have a fever), stop the medication and contact your provider immediately for assessment. They will do a blood test to check for pancreatitis.       Caution:   Tirzepatide (Zepbound, Mounjaro) May decrease effectiveness of your oral birth control while starting and with dose adjustments.  Use backup forms of birth control if necessary.      For any questions or concerns please send a [employer] message to our team or call our weight management call center at [phone] during regular business hours.

## 2024-01-23 NOTE — PROGRESS NOTES
"  Return Medical Weight Management Note         Ida Gayle  MRN:  [id number]  :  1970  SUSI:  2024        Dear Norah Ambrose MD,    I had the pleasure of seeing your patient Ida Gayle. She is a 53 year old female who I am continuing to see for treatment of obesity related to:        2023    12:07 PM   --   I have the following health issues associated with obesity None of the above       Assessment   Problem List Items Addressed This Visit       Pure hypercholesterolemia    Morbid obesity (H) - Primary     Stop metformin  Start Zepbound         Relevant Medications    tirzepatide-Weight Management (ZEPBOUND) 2.5 MG/0.5ML prefilled pen    tirzepatide-Weight Management (ZEPBOUND) 5 MG/0.5ML prefilled pen          PLAN/DISCUSSION:  We discussed the role of pharmacological agents in the treatment of obesity and the \"off-label\" use of medications in this practice. We discussed the risks and benefits of each. We discussed indications, contraindications, potential side effects, and estimated costs of each. Discussed that medications must always be used together with lifestyle changes such as improvements in diet choices, portion control and establishing and maintaining a regular exercise program.     Patient has no history of pancreatitis. Patient has no personal or family history of medullary thyroid carcinoma or MEN2.   Would like to try Zepbound. Aware that most likely will not have insurance coverage but was advised to check for  coupons. Discussed Zepbound and possible side effects in great detail.     Plan:  Stop the metformin  RX for Zepbound sent to pharmacy       FOLLOW-UP:  3 months      SUBJECTIVE/OBJECTIVE:  Initially seen 2023 and started on metformin. Weight gain started after being diagnosed MS and the medication treatment that followed. Originally a prescription for wegovy was given but patient decided she did not want to start it. Also does not think " has insurance coverage. Bupropion was then sent over and she had side effects so discontinued. Was then started on metformin and she continues to take this. Unfortunately no real weight loss. Does think her appetite is mildly suppressed. No real side effects.     Anti-obesity medications:   Current: metformin     Side effects: less diarrhea    Failed/contraindicated:   Wegovy - decided not to take it   Bupropion -  had side effects including heart beating fast and terrible headache  Metformin - currently taking   Phentermine - not a candidate due to heart disease  Topiramate - sedation caution with other medications - worried         1/16/2024    11:55 AM   Weight Loss Medication History Reviewed With Patient   Which weight loss medications are you currently taking on a regular basis? Metformin   Are you having any side effects from the weight loss medication that we have prescribed you? No       Orders Only on 09/27/2023   Component Date Value Ref Range Status    Hemoglobin A1C 09/27/2023 5.2  4.0 - 7.0 % Final       Recent diet changes: 48 oz water most days. Other days more  3 meals daily   B: low carb bread with cottage cheese and everything bagel seasoning with honey  L: low carb tortilla with grilled chicken and peppers. Might make quesadilla  Quest snack in the afternoon  D: ground turkey meat with pasta or turkey meatloaf with mixed veggies.   After dinner snack will crackers and dates    Recent exercise/activity changes: Has MS.  Just got infusion 2 weeks ago and feels terrible. Gets infusion every 6 months. Normally goes to gym 3 times weekly. Will walk a mile.       CURRENT WEIGHT:   296 lbs 0 oz    Initial Weight (lbs): 298 lbs  Last Visits Weight: 298 lb (135.2 kg)  Cumulative weight loss (lbs): 2  Weight Loss Percentage: 0.67%        1/16/2024    11:55 AM   Changes and Difficulties   I have made the following changes to my diet since my last visit: Smaller portions   With regards to my diet, I am still  struggling with: None   I have made the following changes to my activity/exercise since my last visit: Ever   With regards to my activity/exercise, I am still struggling with: None         MEDICATIONS:   Current Outpatient Medications   Medication Sig Dispense Refill    Bacillus Coagulans-Inulin (PROBIOTIC-PREBIOTIC PO)       diazepam (VALIUM) 5 MG tablet TAKE 1-2 TABLETS BY MOUTH ONCE DAILY AS NEEDED FOR ANXIETY      metFORMIN (GLUCOPHAGE XR) 500 MG 24 hr tablet TAKE 1 TABLET BY MOUTH AT DINNER FOR 2 WEEKS THEN CAN INCREASE TO 2 TABLETS DAILY AT DINNER 60 tablet 0    Ocrelizumab (OCREVUS IV) Inject into the vein every 6 months      rizatriptan (MAXALT) 10 MG tablet TAKE 1 TABLET BY MOUTH AT ONSET OF MIGRAINE MAY REPEAT 1 TIME IN 24 HOURS. NOT TO EXCEED 2 IN 24 HOURS      tirzepatide-Weight Management (ZEPBOUND) 2.5 MG/0.5ML prefilled pen Inject 0.5 mLs (2.5 mg) Subcutaneous every 7 days 2 mL 0    tirzepatide-Weight Management (ZEPBOUND) 5 MG/0.5ML prefilled pen Inject 0.5 mLs (5 mg) Subcutaneous every 7 days 2 mL 0    tiZANidine (ZANAFLEX) 4 MG tablet Take 1 tablet (4 mg) by mouth At Bedtime 90 tablet     vitamin D2 (ERGOCALCIFEROL) 56143 units (1250 mcg) capsule Take 50,000 Units by mouth once a week           ROS  General  Fatigue: sometimes from MS  Sleep Quality: OK  Birth Control: hysterectomy  HEENT  Hx of glaucoma: No  Vision changes: No  Cardiovascular  Chest Pain with Exertion: No  Palpitations: No  Hx of heart disease: yes - Just had ablation due to SVT.    Hx of PVD No  Pulmonary  Shortness of breath at rest: No  Shortness of breath with exertion: No  Snoring: No - just had sleep study - NO ERIC  Gastrointestinal  Heartburn: No  Abdominal pain: No  History of pancreatitis: No  Severe constipation: No  Hx of bowel obstruction: No  Gallbladder disease: No  Gastrourinary  History of kidney stones: No  Psychiatric  Moods Stable: Yes  History of drug abuse: No  No history of eating  "disorder  Endocrine  Polydipsia: No  Polyuria: No  Personal or family history of thyroid cancer: No  Neurologic:  Hx of seizures: No  Hx of paresthesias: Yes due to MS      PHYSICAL EXAM:  Objective    BP (!) 136/93 (BP Location: Right arm, Patient Position: Sitting, Cuff Size: Adult Large)   Pulse 97   Ht 5' 9.75\" (1.772 m)   Wt 296 lb (134.3 kg)   LMP 08/02/2021 (Approximate)   SpO2 99%   BMI 42.78 kg/m    GENERAL: Healthy, alert and no distress  EYES: Eyes grossly normal to inspection.  No discharge or erythema, or obvious scleral/conjunctival abnormalities.  RESP: No audible wheeze, cough, or visible cyanosis.  No visible retractions or increased work of breathing.    SKIN: Visible skin clear. No significant rash, abnormal pigmentation or lesions.  NEURO: Cranial nerves grossly intact.  Mentation and speech appropriate for age.  PSYCH: Mentation appears normal, affect normal/bright, judgement and insight intact, normal speech and appearance well-groomed.        Sincerely,    Dominguez Red PA-C    34 minutes spent on the date of the encounter doing chart review, review of test results, patient visit and documentation     "

## 2024-01-24 ENCOUNTER — TELEPHONE (OUTPATIENT)
Dept: SURGERY | Facility: CLINIC | Age: 54
End: 2024-01-24

## 2024-01-24 NOTE — TELEPHONE ENCOUNTER
PRIOR AUTHORIZATION DENIED    Medication: TIRZEPATIDE-WEIGHT MANAGEMENT 2.5 MG/0.5ML SC SOAJ  Insurance Company: [employer] [place] - Phone [phone] Fax [phone]  Denial Date: 1/24/2024  Denial Reason(s):     Appeal Information:     Patient Notified: No

## 2024-01-30 DIAGNOSIS — E66.01 MORBID OBESITY (H): ICD-10-CM

## 2024-02-27 DIAGNOSIS — E66.01 MORBID OBESITY (H): ICD-10-CM

## 2024-02-28 RX ORDER — TIRZEPATIDE 5 MG/.5ML
INJECTION, SOLUTION SUBCUTANEOUS
Qty: 2 ML | Refills: 0 | OUTPATIENT
Start: 2024-02-28

## 2024-02-28 NOTE — TELEPHONE ENCOUNTER
Per pt  msg today:  Yes Jack on Zepbound 5 mg. It s working amazingly!! It s amazing. I have 3 pens left. I usually get my refill on the 15th of the month because I can only use my coupon for the medication once a month. I am have an appointment on the 14th so I think I am good. It would be nice just to get it filled so I can just have it there. It does take [facility] a day to order and fill it.   Since is good until visit - will refuse and have provider refill when sees on the 14th.  Cindy Hamilton, MS, RD, RN

## 2024-03-08 NOTE — PROGRESS NOTES
"Ida is a 53 year old who is being evaluated via a billable video visit.      The patient has been notified of following:     \"This video visit will be conducted via a call between you and your physician/provider. We have found that certain health care needs can be provided without the need for an in-person physical exam.  This service lets us provide the care you need with a video conversation.  If a prescription is necessary we can send it directly to your pharmacy.  If lab work is needed we can place an order for that and you can then stop by our lab to have the test done at a later time.    Video visits are billed at different rates depending on your insurance coverage.  Please reach out to your insurance provider with any questions.    If during the course of the call the physician/provider feels a video visit is not appropriate, you will not be charged for this service.\"    Patient has given verbal consent for Video visit? Yes    How would you like to obtain your AVS? MyChart    If the video visit is dropped, the invitation should be resent by: Text to cell phone: [phone]    Will anyone else be joining your video visit? No    I    Video-Visit Details    Type of service:  Video Visit    Originating Location (pt. Location): Home    Distant Location (provider location):   Off-Site - Provider Home Office    Platform used for Video Visit: Tracab    Video Start Time: 2:02 PM    Video End Time: 2:24 PM          Return Medical Weight Management Note         Ida Gayle  MRN:  [id number]  :  1970  SUSI:  3/14/2024        Dear Norah Ambrose MD,    I had the pleasure of seeing your patient Ida Gayle. She is a 53 year old female who I am continuing to see for treatment of obesity related to:        2023    12:07 PM   --   I have the following health issues associated with obesity None of the above           Assessment   Problem List Items Addressed This Visit       Class 2 severe " obesity due to excess calories with serious comorbidity and body mass index (BMI) of 39.0 to 39.9 in adult (H) - Primary     Continue Zepbound         Pure hypercholesterolemia          PLAN/DISCUSSION:  Congratulated patient on overall weight loss!  Will send over RX for zepbound 5 and 7.5 mg strengths.  Patient will continue to work on lifestyle.     Plan:   Set up a nurse only visit at the closest [facility] to get weight, blood pressure and pulse taken. Please ask that they forward the results.   Get lab drawn  Have breakfast daily  Continue Zepbound         FOLLOW-UP:  June       SUBJECTIVE/OBJECTIVE:  Patient seen 1/23/2024 and started on zepbound. Has been very successful with weight loss! Weight gain started after being diagnosed MS and the medication treatment that followed.  Patient has lost 24 lbs!  And is feeling great. Cravings are down. Portions are smaller. Is not hungry and has no notable side effects.      Anti-obesity medications:   Current: zepbound 5.0 mg on Thursdays   Side effects: Denies nausea, vomiting, abdominal pain, severe constipation, diarrhea, or heartburn      Antiobesity medication history:  Phentermine (Lomaira, Adipex) Not a candidate due to heart disease   Phentermine/Topiramate (Qsymia)    Bupropion/Naltrexone (Contrave)    Liraglutide (Saxenda)    Semaglutide (Wegovy)    Tirzepatide (Zepbound)    Orlistat (Xenical/Mirza)    Off-Label Medications for Obesity  Semaglutide (Ozempic)    Tirzepatide (Mounjaro)    Bupropion (Wellbutrin) Had side effects including heart beating fast and terrible headache    Metformin(Glucophage) No real weight loss   Topiramate (Topamax) sedation caution with other medications   Naltexone            3/7/2024     8:38 AM   Weight Loss Medication History Reviewed With Patient   Are you having any side effects from the weight loss medication that we have prescribed you? No       Recent diet changes: drinks 48-64 oz water daily, 1 cup of coffee in the  AM. 1 cup of ice coffee.   First meal is at noon: Had chicken with veggies with a bit of pasta over greens  Dinner: same thing    Recent exercise/activity changes: walking and doing yoga.     CURRENT WEIGHT:   274 lbs 0 oz    Initial Weight (lbs): 298 lbs  Last Visits Weight: 296 lb (134.3 kg)  Cumulative weight loss (lbs): 24  Weight Loss Percentage: 8.05%        3/7/2024     8:38 AM   Changes and Difficulties   I have made the following changes to my diet since my last visit: No, same   With regards to my diet, I am still struggling with: Nothing. I dont crave snacks anymore.  Since taking Zepbound   I have made the following changes to my activity/exercise since my last visit: Yoga   With regards to my activity/exercise, I am still struggling with: Nothing         MEDICATIONS:   Current Outpatient Medications   Medication Sig Dispense Refill    Bacillus Coagulans-Inulin (PROBIOTIC-PREBIOTIC PO)       diazepam (VALIUM) 5 MG tablet TAKE 1-2 TABLETS BY MOUTH ONCE DAILY AS NEEDED FOR ANXIETY      Ocrelizumab (OCREVUS IV) Inject into the vein every 6 months      rizatriptan (MAXALT) 10 MG tablet TAKE 1 TABLET BY MOUTH AT ONSET OF MIGRAINE MAY REPEAT 1 TIME IN 24 HOURS. NOT TO EXCEED 2 IN 24 HOURS      tirzepatide-Weight Management (ZEPBOUND) 5 MG/0.5ML prefilled pen Inject 0.5 mLs (5 mg) Subcutaneous every 7 days 2 mL 0    tiZANidine (ZANAFLEX) 4 MG tablet Take 1 tablet (4 mg) by mouth At Bedtime 90 tablet     vitamin D2 (ERGOCALCIFEROL) 69348 units (1250 mcg) capsule Take 50,000 Units by mouth once a week      metFORMIN (GLUCOPHAGE XR) 500 MG 24 hr tablet TAKE 1 TABLET BY MOUTH AT DINNER FOR 2 WEEKS THEN CAN INCREASE TO 2 TABLETS DAILY AT DINNER (Patient not taking: Reported on 3/14/2024) 60 tablet 0    tirzepatide-Weight Management (ZEPBOUND) 2.5 MG/0.5ML prefilled pen Inject 0.5 mLs (2.5 mg) Subcutaneous every 7 days (Patient not taking: Reported on 3/14/2024) 2 mL 0         ROS:  General  Fatigue: No  Sleep  "Quality: OK      PHYSICAL EXAM:  Objective    Ht 5' 9.75\" (1.772 m)   Wt 274 lb (124.3 kg)   LMP 08/02/2021 (Approximate)   BMI 39.60 kg/m    GENERAL: Healthy, alert and no distress  EYES: Eyes grossly normal to inspection.  No discharge or erythema, or obvious scleral/conjunctival abnormalities.  RESP: No audible wheeze, cough, or visible cyanosis.  No visible retractions or increased work of breathing.    SKIN: Visible skin clear. No significant rash, abnormal pigmentation or lesions.  NEURO: Cranial nerves grossly intact.  Mentation and speech appropriate for age.  PSYCH: Mentation appears normal, affect normal/bright, judgement and insight intact, normal speech and appearance well-groomed.        Sincerely,    Dominguez Red PA-C    33 minutes spent on the date of the encounter doing chart review, review of test results, patient visit and documentation       "

## 2024-03-14 ENCOUNTER — VIRTUAL VISIT (OUTPATIENT)
Dept: SURGERY | Facility: CLINIC | Age: 54
End: 2024-03-14
Payer: COMMERCIAL

## 2024-03-14 VITALS — HEIGHT: 70 IN | WEIGHT: 274 LBS | BODY MASS INDEX: 39.22 KG/M2

## 2024-03-14 DIAGNOSIS — E66.01 CLASS 2 SEVERE OBESITY DUE TO EXCESS CALORIES WITH SERIOUS COMORBIDITY AND BODY MASS INDEX (BMI) OF 39.0 TO 39.9 IN ADULT (H): Primary | ICD-10-CM

## 2024-03-14 DIAGNOSIS — E78.00 PURE HYPERCHOLESTEROLEMIA: ICD-10-CM

## 2024-03-14 DIAGNOSIS — E66.812 CLASS 2 SEVERE OBESITY DUE TO EXCESS CALORIES WITH SERIOUS COMORBIDITY AND BODY MASS INDEX (BMI) OF 39.0 TO 39.9 IN ADULT (H): Primary | ICD-10-CM

## 2024-03-14 DIAGNOSIS — E66.01 MORBID OBESITY (H): ICD-10-CM

## 2024-03-14 PROCEDURE — 99214 OFFICE O/P EST MOD 30 MIN: CPT | Mod: 95 | Performed by: PHYSICIAN ASSISTANT

## 2024-03-14 RX ORDER — TIRZEPATIDE 5 MG/.5ML
INJECTION, SOLUTION SUBCUTANEOUS
Qty: 2 ML | Refills: 0 | OUTPATIENT
Start: 2024-03-14

## 2024-03-14 NOTE — TELEPHONE ENCOUNTER
This is duplicate due to waiting for PA - will refuse per clinic protocol.  Cindy Hamilton MS, RD, RN

## 2024-03-14 NOTE — PATIENT INSTRUCTIONS
"Nice to talk with you today! Thank you for allowing me the privilege of caring for you.   We hope we provided you with the excellent service you deserve.     To ensure the quality of our services you may receive a patient satisfaction survey from an independent monitoring company.  The greatest compliment you can give is \"Likely to Recommend\"      Below is our plan we discussed.-  HAL Mix       Set up a nurse only visit at the closest [facility] to get weight, blood pressure and pulse taken. Please ask that they forward the results.   Get lab drawn  Have breakfast daily  Continue Zepbound     Please call [phone] and schedule a follow up with Dominguez Red PA-C in 3 and 6 months.  If you need to reach me sooner you can do so by calling [phone].    Have a great day!         Protein Link:   Protein Sources for Weight Loss           Eat Better ? Move More ? Live Well    Eat 3 nutrient-rich meals each day    Don t skip meals--it will cause you to overeat later in the day!    Eating fiber (vegetables/fruits/whole grains) and protein with meals helps you stay full longer    Choose foods with less than 10 grams of sugar and 5 grams of fat per serving to prevent excess calories and weight re-gain  Eat around the same times each day to develop a routine eating schedule   Avoid snacking unless physically hungry.   Planned snacks: 1-2 times per day and no more than 150 calories    Eat protein first   Protein helps with healing, maintaining adequate muscle mass, reducing hunger and optimizing nutritional status   Aim for 60-80 grams of protein per day   Fill up on Fiber   Fiber comes from plants--fruits, veggies, whole grains, nuts/seeds and beans   Fiber is low in calories, high in phytonutrients and helps you stay full longer   Aim for 25-35 grams per day by eating fiber with meals and snacks  Eat S-L-O-W-L-Y   Take 20-30 minutes to eat each meal by taking small bites, chewing foods to applesauce consistency " or 20-30 times before you swallow   Eating foods too fast can delay satiety/fullness signals and increase overeating   Slow down your eating by using toddler utensils, putting your fork/spoon down between bites and not watching TV or emailing during meals!   Keep a Journal         Writing down what you eat, how you feel and when you are active helps you identify new changes to work on from week to week         Look for ways to cut 100 calories from your current diet 2-3 times per day  Drink 64 ounces of 0-Calorie drinks between meals   Water   Zero calorie Propel  or Vitamin Water     SoBe Lifewater  Zero Calories   Crystal Light , Sugar-Free Venu-Aid , and other sugar-free lemonade or flavored levine   Keep Caffeine to less than 300mg per day ie: 3-6oz cups coffee     Work up to 45-60 minutes of physical activity most days of the week   Helps with losing weight and prevent regaining those extra pounds!    Do a combo of cardio (walking/water exercises) and strength training (lifting weights/Vinyasa yoga)    Avoid Mindless Eating   Be present when you eat--take note of the smell, taste and quality of your food   Make a list of alternative activities you could do to prevent eating out of boredom/stress  Go for a walk, call a friend, chew gum, paint your nails, re-organize the garage, etc

## 2024-03-15 ENCOUNTER — TELEPHONE (OUTPATIENT)
Dept: SURGERY | Facility: CLINIC | Age: 54
End: 2024-03-15
Payer: COMMERCIAL

## 2024-03-15 NOTE — TELEPHONE ENCOUNTER
PRIOR AUTHORIZATION DENIED    Medication: ZEPBOUND 2.5 MG/0.5ML SC SOAJ  Insurance Company: [employer] [place] - Phone [phone] Fax [phone]  Denial Date: 3/15/2024  Denial Reason(s): weight-loss meds are a plan exclusion and not covered by insurance for DX    Appeal Information:     Patient Notified: no    
Patient is an OOP payer.    Elda SMITH RN    
Retail Pharmacy Prior Authorization Team   Phone: [phone]      EPA DENIED: WAITING ON FULL DENIAL LETTER    
97.7
97.7

## 2024-04-08 DIAGNOSIS — E66.01 MORBID OBESITY (H): Primary | ICD-10-CM

## 2024-04-13 DIAGNOSIS — E66.01 MORBID OBESITY (H): ICD-10-CM

## 2024-04-15 RX ORDER — TIRZEPATIDE 5 MG/.5ML
INJECTION, SOLUTION SUBCUTANEOUS
Qty: 2 ML | Refills: 0 | OUTPATIENT
Start: 2024-04-15

## 2024-04-15 NOTE — TELEPHONE ENCOUNTER
Per pt [facility] msg today.  Has 2 month supply of the 7.5 mg dose.  No refill needed at this time.  Will refuse per clinic protocol.  Cindy Hamilton MS, RD, RN

## 2024-04-16 DIAGNOSIS — E66.01 MORBID OBESITY (H): Primary | ICD-10-CM

## 2024-04-16 LAB
BUN SERPL-MCNC: 11 MG/DL (ref 7–25)
BUN/CREATININE RATIO: 11.6 (ref 6–32)
CALCIUM SERPL-MCNC: 9.9 MG/DL (ref 8.6–10.3)
CHLORIDE SERPLBLD-SCNC: 102.1 MMOL/L (ref 98–110)
CO2 SERPL-SCNC: 26.3 MMOL/L (ref 20–32)
CREAT SERPL-MCNC: 0.95 MG/DL (ref 0.6–1.3)
GLUCOSE SERPL-MCNC: 89 MG/DL (ref 60–99)
POTASSIUM SERPL-SCNC: 4.6 MMOL/L (ref 3.5–5.3)
SODIUM SERPL-SCNC: 138.4 MMOL/L (ref 135–146)

## 2024-04-16 PROCEDURE — 36415 COLL VENOUS BLD VENIPUNCTURE: CPT | Performed by: FAMILY MEDICINE

## 2024-04-16 PROCEDURE — 80048 BASIC METABOLIC PNL TOTAL CA: CPT | Performed by: FAMILY MEDICINE

## 2024-04-29 ENCOUNTER — TELEPHONE (OUTPATIENT)
Dept: SURGERY | Facility: CLINIC | Age: 54
End: 2024-04-29
Payer: COMMERCIAL

## 2024-04-29 NOTE — TELEPHONE ENCOUNTER
Reason for Call:  Other prescription    Detailed comments: Patient has questions about her Zepbound. Patient has questions about dosing due to shortage. Patient has 1 box of 5 mg left, 2 shots left. Patient has 1 box of 7 mg shots. Patient is asking about going back to 2.5 mg & take 4 wks of that. Please advise.    Phone Number Patient can be reached at: Home number on file [phone] (home)    Best Time: any    Can we leave a detailed message on this number? YES    Call taken on 4/29/2024 at 2:59 PM by Anna Rob

## 2024-04-29 NOTE — TELEPHONE ENCOUNTER
Returned patient call re: dosing    Patient reporting she is currently on Zepbound 5 mg but she has a box of 2.5 mg Zepbound she never picked up.  She is wondering if she could pick this up and take 2 shots weekly to make the equivalent of the 5 mg Zepbound until she progresses to the 7.5 mg strength.    Since pt is cash pay, advised her it is ok to do this.    Elda SMITH RN

## 2024-05-07 DIAGNOSIS — E66.01 MORBID OBESITY (H): ICD-10-CM

## 2024-06-19 NOTE — PROGRESS NOTES
"Ida is a 54 year old who is being evaluated via a billable video visit.      The patient has been notified of following:     \"This video visit will be conducted via a call between you and your physician/provider. We have found that certain health care needs can be provided without the need for an in-person physical exam.  This service lets us provide the care you need with a video conversation.  If a prescription is necessary we can send it directly to your pharmacy.  If lab work is needed we can place an order for that and you can then stop by our lab to have the test done at a later time.    Video visits are billed at different rates depending on your insurance coverage.  Please reach out to your insurance provider with any questions.    If during the course of the call the physician/provider feels a video visit is not appropriate, you will not be charged for this service.\"    Patient has given verbal consent for Video visit? Yes    How would you like to obtain your AVS? MyChart    If the video visit is dropped, the invitation should be resent by: MY CHART    Will anyone else be joining your video visit? No    I    Video-Visit Details    Type of service:  Video Visit    Originating Location (pt. Location): Home    Distant Location (provider location):   Off-Site - Provider Home Office    Platform used for Video Visit: Perdoo    Video Start Time: 11:32 AM    Video End Time:11:52 AM        Return Medical Weight Management Note         Ida Gayle  MRN:  [id number]  :  1970  SUSI:  2024        Dear Norah Ambrose MD,    I had the pleasure of seeing your patient Ida Gayle. She is a 54 year old female who I am continuing to see for treatment of obesity related to:        2023    12:07 PM   --   I have the following health issues associated with obesity None of the above       Assessment   Problem List Items Addressed This Visit       Class 2 severe obesity due to excess calories " with serious comorbidity and body mass index (BMI) of 37.0 to 37.9 in adult (H) - Primary     Continue zepbound         Relevant Medications    tirzepatide-Weight Management (ZEPBOUND) 7.5 MG/0.5ML prefilled pen    tirzepatide-Weight Management (ZEPBOUND) 10 MG/0.5ML prefilled pen    Pure hypercholesterolemia    Relevant Medications    tirzepatide-Weight Management (ZEPBOUND) 10 MG/0.5ML prefilled pen          PLAN/DISCUSSION:  Congratulated patient on weight loss and lifestyle changes. Will send over prescriptions for zepbound 7.5 and 10 mg doses.       FOLLOW-UP:  3 months      SUBJECTIVE/OBJECTIVE:  Patient last seen 3/14/2024 and was continued on zepbound. Is up to the 7.5 mg. Food thoughts are not there. Feels normal.. kind of like when she was a kid. Eat when your hungry and not think about food all day.     Reviewed recent BMP. Does not crave alcohol or chocolate anymore  Has some constipation - will use miralax and some a gummy maybe once weekly.     Feels so good and is just happy. Having unexpected benefits from this medication that has significantly impacted her overall health.    Anti-obesity medications:   Current: zepbound 7.5 mg on Thursdays   Side effects: Denies nausea, vomiting, abdominal pain, severe constipation, diarrhea, or heartburn      Antiobesity medication history:  Phentermine (Lomaira, Adipex) Not a candidate due to heart disease    Phentermine/Topiramate (Qsymia)    Bupropion/Naltrexone (Contrave)    Liraglutide (Saxenda)    Semaglutide (Wegovy)    Tirzepatide (Zepbound) Currently taking   Orlistat (Xenical/Mirza)    Off-Label Medications for Obesity  Semaglutide (Ozempic)    Tirzepatide (Mounjaro)    Bupropion (Wellbutrin)   Had side effects including heart beating fast and terrible headache      Metformin(Glucophage) No real weight loss    Topiramate (Topamax) sedation caution with other medications    Naltexone              6/20/2024     4:44 PM   Weight Loss Medication History  "Reviewed With Patient   Which weight loss medications are you currently taking on a regular basis? Ozempic   Are you having any side effects from the weight loss medication that we have prescribed you? No       Orders Only on 04/16/2024   Component Date Value Ref Range Status    Carbon Dioxide 04/16/2024 26.3  20 - 32 mmol/L Final    Creatinine 04/16/2024 0.95  0.60 - 1.30 mg/dL Final    Glucose 04/16/2024 89  60 - 99 mg/dL Final    Sodium 04/16/2024 138.4  135 - 146 mmol/L Final    Potassium 04/16/2024 4.6  3.5 - 5.3 mmol/L Final    Chloride 04/16/2024 102.1  98 - 110 mmol/L Final    Urea Nitrogen 04/16/2024 11  7 - 25 mg/dL Final    Calcium 04/16/2024 9.9  8.6 - 10.3 mg/dL Final    BUN/Creatinine Ratio 04/16/2024 11.6  6 - 32 Final       Recent diet changes: B: sometimes eats. Today had low carb toast with humus and avocado.  Every morning had protein in coffee  L: california roll  D: grilled steak with salad   Fluids: 64 oz water daily. - Has 16 oz - 4 times daily. Might have a mini ginger ale. Gave up all alcohol but medicine makes her not want it.     Recent exercise/activity changes: yoga every morning. Does \"Daily 50\" which is 50 lunges, squats, and planks. Currently up to 30 of each.        CURRENT WEIGHT:   256 lbs 0 oz    Initial Weight (lbs): 298 lbs  Last Visits Weight: 274 lb (124.3 kg)  Cumulative weight loss (lbs): 42  Weight Loss Percentage: 14.09%        6/20/2024     4:44 PM   Changes and Difficulties   I have made the following changes to my diet since my last visit: Eating more protein   With regards to my diet, I am still struggling with: Nothing   I have made the following changes to my activity/exercise since my last visit: Yoga   With regards to my activity/exercise, I am still struggling with: Nothing         MEDICATIONS:   Current Outpatient Medications   Medication Sig Dispense Refill    Bacillus Coagulans-Inulin (PROBIOTIC-PREBIOTIC PO)       diazepam (VALIUM) 5 MG tablet TAKE 1-2 TABLETS " "BY MOUTH ONCE DAILY AS NEEDED FOR ANXIETY      Ocrelizumab (OCREVUS IV) Inject into the vein every 6 months      rizatriptan (MAXALT) 10 MG tablet TAKE 1 TABLET BY MOUTH AT ONSET OF MIGRAINE MAY REPEAT 1 TIME IN 24 HOURS. NOT TO EXCEED 2 IN 24 HOURS      tirzepatide-Weight Management (ZEPBOUND) 10 MG/0.5ML prefilled pen Inject 0.5 mLs (10 mg) Subcutaneous every 7 days 2 mL 1    tirzepatide-Weight Management (ZEPBOUND) 7.5 MG/0.5ML prefilled pen Inject 0.5 mLs (7.5 mg) Subcutaneous every 7 days 2 mL 1    tiZANidine (ZANAFLEX) 4 MG tablet Take 1 tablet (4 mg) by mouth At Bedtime 90 tablet     vitamin D2 (ERGOCALCIFEROL) 40419 units (1250 mcg) capsule Take 50,000 Units by mouth once a week      metFORMIN (GLUCOPHAGE XR) 500 MG 24 hr tablet TAKE 1 TABLET BY MOUTH AT DINNER FOR 2 WEEKS THEN CAN INCREASE TO 2 TABLETS DAILY AT DINNER (Patient not taking: Reported on 3/14/2024) 60 tablet 0    tirzepatide-Weight Management (ZEPBOUND) 2.5 MG/0.5ML prefilled pen Inject 0.5 mLs (2.5 mg) Subcutaneous every 7 days (Patient not taking: Reported on 6/26/2024) 2 mL 0    tirzepatide-Weight Management (ZEPBOUND) 5 MG/0.5ML prefilled pen Inject 0.5 mLs (5 mg) Subcutaneous every 7 days (Patient not taking: Reported on 6/26/2024) 2 mL 1    tirzepatide-Weight Management (ZEPBOUND) 5 MG/0.5ML prefilled pen Inject 0.5 mLs (5 mg) Subcutaneous every 7 days (Patient not taking: Reported on 6/26/2024) 2 mL 0         ROS:  General  Fatigue: No  Sleep Quality: OK      PHYSICAL EXAM:  Objective    Ht 5' 9.75\" (1.772 m)   Wt 256 lb (116.1 kg)   LMP 08/02/2021 (Approximate)   BMI 37.00 kg/m    GENERAL: Healthy, alert and no distress  EYES: Eyes grossly normal to inspection.  No discharge or erythema, or obvious scleral/conjunctival abnormalities.  RESP: No audible wheeze, cough, or visible cyanosis.  No visible retractions or increased work of breathing.    SKIN: Visible skin clear. No significant rash, abnormal pigmentation or lesions.  NEURO: " Cranial nerves grossly intact.  Mentation and speech appropriate for age.  PSYCH: Mentation appears normal, affect normal/bright, judgement and insight intact, normal speech and appearance well-groomed.        Sincerely,    Dominguez Red PA-C    25 minutes spent on the date of the encounter doing chart review, review of test results, patient visit and documentation

## 2024-06-27 ENCOUNTER — VIRTUAL VISIT (OUTPATIENT)
Dept: SURGERY | Facility: CLINIC | Age: 54
End: 2024-06-27
Payer: COMMERCIAL

## 2024-06-27 VITALS — WEIGHT: 256 LBS | BODY MASS INDEX: 36.65 KG/M2 | HEIGHT: 70 IN

## 2024-06-27 DIAGNOSIS — E66.812 CLASS 2 SEVERE OBESITY DUE TO EXCESS CALORIES WITH SERIOUS COMORBIDITY AND BODY MASS INDEX (BMI) OF 37.0 TO 37.9 IN ADULT (H): Primary | ICD-10-CM

## 2024-06-27 DIAGNOSIS — E66.01 CLASS 2 SEVERE OBESITY DUE TO EXCESS CALORIES WITH SERIOUS COMORBIDITY AND BODY MASS INDEX (BMI) OF 37.0 TO 37.9 IN ADULT (H): Primary | ICD-10-CM

## 2024-06-27 DIAGNOSIS — E78.00 PURE HYPERCHOLESTEROLEMIA: ICD-10-CM

## 2024-06-27 PROCEDURE — 99213 OFFICE O/P EST LOW 20 MIN: CPT | Mod: 95 | Performed by: PHYSICIAN ASSISTANT

## 2024-06-27 NOTE — PATIENT INSTRUCTIONS
"Nice to talk with you today! Thank you for allowing me the privilege of caring for you.   We hope we provided you with the excellent service you deserve.     To ensure the quality of our services you may receive a patient satisfaction survey from an independent monitoring company.  The greatest compliment you can give is \"Likely to Recommend\"      Below is our plan we discussed.-  HAL Mix      Continue zepbound    Please call [phone] and schedule a follow up with Dominguez Red PA-C in 3 months.  If you need to reach me sooner you can do so by calling [phone].    Have a great day!       Eat Better ? Move More ? Live Well    Eat 3 nutrient-rich meals each day    Don t skip meals--it will cause you to overeat later in the day!    Eating fiber (vegetables/fruits/whole grains) and protein with meals helps you stay full longer    Choose foods with less than 10 grams of sugar and 5 grams of fat per serving to prevent excess calories and weight re-gain  Eat around the same times each day to develop a routine eating schedule   Avoid snacking unless physically hungry.   Planned snacks: 1-2 times per day and no more than 150 calories    Eat protein first   Protein helps with healing, maintaining adequate muscle mass, reducing hunger and optimizing nutritional status   Aim for 60-80 grams of protein per day   Fill up on Fiber   Fiber comes from plants--fruits, veggies, whole grains, nuts/seeds and beans   Fiber is low in calories, high in phytonutrients and helps you stay full longer   Aim for 25-35 grams per day by eating fiber with meals and snacks  Eat S-L-O-W-L-Y   Take 20-30 minutes to eat each meal by taking small bites, chewing foods to applesauce consistency or 20-30 times before you swallow   Eating foods too fast can delay satiety/fullness signals and increase overeating   Slow down your eating by using toddler utensils, putting your fork/spoon down between bites and not watching TV or emailing during " meals!   Keep a Journal         Writing down what you eat, how you feel and when you are active helps you identify new changes to work on from week to week         Look for ways to cut 100 calories from your current diet 2-3 times per day  Drink 64 ounces of 0-Calorie drinks between meals   Water   Zero calorie Propel  or Vitamin Water     SoBe Lifewater  Zero Calories   Crystal Light , Sugar-Free Venu-Aid , and other sugar-free lemonade or flavored levine   Keep Caffeine to less than 300mg per day ie: 3-6oz cups coffee     Work up to 45-60 minutes of physical activity most days of the week   Helps with losing weight and prevent regaining those extra pounds!    Do a combo of cardio (walking/water exercises) and strength training (lifting weights/Vinyasa yoga)    Avoid Mindless Eating   Be present when you eat--take note of the smell, taste and quality of your food   Make a list of alternative activities you could do to prevent eating out of boredom/stress  Go for a walk, call a friend, chew gum, paint your nails, re-organize the garage, etc

## 2024-08-20 ENCOUNTER — MYC MEDICAL ADVICE (OUTPATIENT)
Dept: SURGERY | Facility: CLINIC | Age: 54
End: 2024-08-20
Payer: COMMERCIAL

## 2024-08-20 DIAGNOSIS — E66.01 CLASS 2 SEVERE OBESITY DUE TO EXCESS CALORIES WITH SERIOUS COMORBIDITY AND BODY MASS INDEX (BMI) OF 37.0 TO 37.9 IN ADULT (H): ICD-10-CM

## 2024-08-20 DIAGNOSIS — E66.812 CLASS 2 SEVERE OBESITY DUE TO EXCESS CALORIES WITH SERIOUS COMORBIDITY AND BODY MASS INDEX (BMI) OF 37.0 TO 37.9 IN ADULT (H): ICD-10-CM

## 2024-08-20 DIAGNOSIS — E78.00 PURE HYPERCHOLESTEROLEMIA: ICD-10-CM

## 2024-08-20 RX ORDER — TIRZEPATIDE 7.5 MG/.5ML
INJECTION, SOLUTION SUBCUTANEOUS
Qty: 2 ML | Refills: 1 | Status: SHIPPED | OUTPATIENT
Start: 2024-08-20

## 2024-10-16 ENCOUNTER — TRANSFERRED RECORDS (OUTPATIENT)
Dept: FAMILY MEDICINE | Facility: CLINIC | Age: 54
End: 2024-10-16

## 2024-11-03 ENCOUNTER — HEALTH MAINTENANCE LETTER (OUTPATIENT)
Age: 54
End: 2024-11-03

## 2024-11-05 NOTE — PROGRESS NOTES
"Ida is a 54 year old who is being evaluated via a billable video visit.      The patient has been notified of following:     \"This video visit will be conducted via a call between you and your physician/provider. We have found that certain health care needs can be provided without the need for an in-person physical exam.  This service lets us provide the care you need with a video conversation.  If a prescription is necessary we can send it directly to your pharmacy.  If lab work is needed we can place an order for that and you can then stop by our lab to have the test done at a later time.    Video visits are billed at different rates depending on your insurance coverage.  Please reach out to your insurance provider with any questions.    If during the course of the call the physician/provider feels a video visit is not appropriate, you will not be charged for this service.\"    Patient has given verbal consent for Video visit? Yes    How would you like to obtain your AVS? MyChart    If the video visit is dropped, the invitation should be resent by:My Chart    Will anyone else be joining your video visit? No    I    Video-Visit Details    Type of service:  Video Visit    Originating Location (pt. Location): Home    Distant Location (provider location):   Off-Site - Provider Home Office    Platform used for Video Visit: Admify    Video Start Time: 11:35 AM    Video End Time: 11:54 AM            Return Medical Weight Management Note         Ida Gayle  MRN:  [id number]  :  1970  SUSI:  2024        Dear Norah Ambrose MD,    I had the pleasure of seeing your patient Ida Gayle. She is a 54 year old female who I am continuing to see for treatment of obesity related to:        2023    12:07 PM   --   I have the following health issues associated with obesity None of the above           Assessment   Problem List Items Addressed This Visit       Class 2 severe obesity due to excess " calories with serious comorbidity and body mass index (BMI) of 35.0 to 35.9 in adult (H) - Primary    Relevant Medications    ZEPBOUND 12.5 MG/0.5ML prefilled pen    Other Relevant Orders    Basic metabolic panel    Pure hypercholesterolemia    Relevant Medications    ZEPBOUND 12.5 MG/0.5ML prefilled pen    Other Relevant Orders    Basic metabolic panel          PLAN/DISCUSSION:  Congratulated patient on overall weight loss and lifestyle changes. Emphasized the importance of diet and activity for long term success      Plan:  Get lab drawn  Incorporate more cardio  Continue zepbound      FOLLOW-UP:  March and July         SUBJECTIVE/OBJECTIVE:  Patient last seen 6/27/2024 and was continued on zepbound. Is up to the 10 mg in the past couple of months. Has noticed more appetite suppression with this dose but does notice a bit more hunger on days 5-7 in between doses. Tolerating without issue.  Drinking a lot of water and stopping alcohol all together.    Has some mild constipation but takes gummy senekot  and gets in 64 oz water.     Anti-obesity medications:   Current: zepbound 10 mg on Thursdays   Side effects: Denies nausea, vomiting, abdominal pain, severe constipation, diarrhea, or heartburn      Antiobesity medication history:  Phentermine (Lomaira, Adipex) Not a candidate due to heart disease    Phentermine/Topiramate (Qsymia)     Bupropion/Naltrexone (Contrave)     Liraglutide (Saxenda)     Semaglutide (Wegovy)     Tirzepatide (Zepbound) Currently taking   Orlistat (Xenical/Mirza)     Off-Label Medications for Obesity  Semaglutide (Ozempic)     Tirzepatide (Mounjaro)     Bupropion (Wellbutrin)   Had side effects including heart beating fast and terrible headache      Metformin(Glucophage) No real weight loss    Topiramate (Topamax) sedation caution with other medications    Naltexone              11/11/2024    10:38 AM   Weight Loss Medication History Reviewed With Patient   Which weight loss medications are  you currently taking on a regular basis? Ozempic   If you are not taking a weight loss medication that was prescribed to you, please indicate why: My insurance does not cover the cost   Are you having any side effects from the weight loss medication that we have prescribed you? No       Recent diet changes: gets 6-8 glasses of water daily   B: [employer] K[employer] with butter. Before breakfast will add collagen to coffee that hs 20 grams   L: leftover brisket with carrots  D: turkey chili  Minimal snacking  No longer drinks alcohol as it makes patient drink   Might have THC seltzer recommended by MS doctor    Recent exercise/activity changes: Walking and does yoga every morning. Does squats and wall push ups       CURRENT WEIGHT:   247 lbs 0 oz    Initial Weight (lbs): 298 lbs  Last Visits Weight: 256 lb (116.1 kg)  Cumulative weight loss (lbs): 51  Weight Loss Percentage: 17.11%        11/11/2024    10:38 AM   Changes and Difficulties   I have made the following changes to my diet since my last visit: Anout the same. I eat a bit more protein   With regards to my diet, I am still struggling with: Nothing   I have made the following changes to my activity/exercise since my last visit: Walking and yoga   With regards to my activity/exercise, I am still struggling with: Nothing         MEDICATIONS:   Current Outpatient Medications   Medication Sig Dispense Refill    Bacillus Coagulans-Inulin (PROBIOTIC-PREBIOTIC PO)       diazepam (VALIUM) 5 MG tablet TAKE 1-2 TABLETS BY MOUTH ONCE DAILY AS NEEDED FOR ANXIETY      Ocrelizumab (OCREVUS IV) Inject into the vein every 6 months      rizatriptan (MAXALT) 10 MG tablet TAKE 1 TABLET BY MOUTH AT ONSET OF MIGRAINE MAY REPEAT 1 TIME IN 24 HOURS. NOT TO EXCEED 2 IN 24 HOURS      tirzepatide-Weight Management (ZEPBOUND) 10 MG/0.5ML prefilled pen Inject 0.5 mLs (10 mg) subcutaneously every 7 days 2 mL 2    tiZANidine (ZANAFLEX) 4 MG tablet Take 1 tablet (4 mg) by mouth At Bedtime 90  "tablet     vitamin D2 (ERGOCALCIFEROL) 01636 units (1250 mcg) capsule Take 50,000 Units by mouth once a week      ZEPBOUND 12.5 MG/0.5ML prefilled pen Inject 0.5 mLs (12.5 mg) subcutaneously every 7 days. 2 mL 2    metFORMIN (GLUCOPHAGE XR) 500 MG 24 hr tablet TAKE 1 TABLET BY MOUTH AT DINNER FOR 2 WEEKS THEN CAN INCREASE TO 2 TABLETS DAILY AT DINNER (Patient not taking: Reported on 11/14/2024) 60 tablet 0    tirzepatide-Weight Management (ZEPBOUND) 2.5 MG/0.5ML prefilled pen Inject 0.5 mLs (2.5 mg) Subcutaneous every 7 days (Patient not taking: Reported on 11/13/2024) 2 mL 0    tirzepatide-Weight Management (ZEPBOUND) 5 MG/0.5ML prefilled pen Inject 0.5 mLs (5 mg) Subcutaneous every 7 days (Patient not taking: Reported on 11/13/2024) 2 mL 1    tirzepatide-Weight Management (ZEPBOUND) 5 MG/0.5ML prefilled pen Inject 0.5 mLs (5 mg) Subcutaneous every 7 days (Patient not taking: Reported on 11/13/2024) 2 mL 0    ZEPBOUND 7.5 MG/0.5ML prefilled pen ADMINISTER 7.5 MG UNDER THE SKIN EVERY 7 DAYS (Patient not taking: Reported on 11/13/2024) 2 mL 1         ROS:  General  Fatigue: No  Sleep Quality: OK      PHYSICAL EXAM:  Objective    Ht 5' 9.75\" (1.772 m)   Wt 247 lb (112 kg)   LMP 08/02/2021 (Approximate)   BMI 35.70 kg/m    GENERAL: Healthy, alert and no distress  EYES: Eyes grossly normal to inspection.  No discharge or erythema, or obvious scleral/conjunctival abnormalities.  RESP: No audible wheeze, cough, or visible cyanosis.  No visible retractions or increased work of breathing.    SKIN: Visible skin clear. No significant rash, abnormal pigmentation or lesions.  NEURO: Cranial nerves grossly intact.  Mentation and speech appropriate for age.  PSYCH: Mentation appears normal, affect normal/bright, judgement and insight intact, normal speech and appearance well-groomed.        Sincerely,    Dominguez Red PA-C    25 minutes spent on the date of the encounter doing chart review, review of test results, patient " visit and documentation

## 2024-11-14 ENCOUNTER — VIRTUAL VISIT (OUTPATIENT)
Dept: SURGERY | Facility: CLINIC | Age: 54
End: 2024-11-14
Payer: COMMERCIAL

## 2024-11-14 VITALS — WEIGHT: 247 LBS | BODY MASS INDEX: 35.36 KG/M2 | HEIGHT: 70 IN

## 2024-11-14 DIAGNOSIS — E66.812 CLASS 2 SEVERE OBESITY DUE TO EXCESS CALORIES WITH SERIOUS COMORBIDITY AND BODY MASS INDEX (BMI) OF 35.0 TO 35.9 IN ADULT (H): Primary | ICD-10-CM

## 2024-11-14 DIAGNOSIS — E78.00 PURE HYPERCHOLESTEROLEMIA: ICD-10-CM

## 2024-11-14 DIAGNOSIS — E66.01 CLASS 2 SEVERE OBESITY DUE TO EXCESS CALORIES WITH SERIOUS COMORBIDITY AND BODY MASS INDEX (BMI) OF 35.0 TO 35.9 IN ADULT (H): Primary | ICD-10-CM

## 2024-11-14 PROCEDURE — G2211 COMPLEX E/M VISIT ADD ON: HCPCS | Mod: 95 | Performed by: PHYSICIAN ASSISTANT

## 2024-11-14 PROCEDURE — 99213 OFFICE O/P EST LOW 20 MIN: CPT | Mod: 95 | Performed by: PHYSICIAN ASSISTANT

## 2024-11-14 RX ORDER — TIRZEPATIDE 12.5 MG/.5ML
12.5 INJECTION, SOLUTION SUBCUTANEOUS
Qty: 2 ML | Refills: 2 | Status: SHIPPED | OUTPATIENT
Start: 2024-11-14

## 2024-11-14 NOTE — PATIENT INSTRUCTIONS
"Nice to talk with you today! Thank you for allowing me the privilege of caring for you.   We hope we provided you with the excellent service you deserve.     To ensure the quality of our services you may receive a patient satisfaction survey from an independent monitoring company.  The greatest compliment you can give is \"Likely to Recommend\"      Below is our plan we discussed.-  HAL Mix      Plan:  Get lab drawn  Incorporate more cardio  Continue zepbound    Scheduled for a follow up with Dominguez Red PA-C in March.  If you need to reach me sooner you can do so by calling [phone].    Have a great day!     Eat Better ? Move More ? Live Well    Eat 3 nutrient-rich meals each day    Don t skip meals--it will cause you to overeat later in the day!    Eating fiber (vegetables/fruits/whole grains) and protein with meals helps you stay full longer    Choose foods with less than 10 grams of sugar and 5 grams of fat per serving to prevent excess calories and weight gain  Eat around the same times each day to develop a routine eating schedule   Avoid snacking unless physically hungry.   Planned snacks: 1-2 times per day and no more than 150 calories    Eat protein first   Protein helps with healing, maintaining adequate muscle mass, reducing hunger and optimizing nutritional status   Aim for 60-80 grams of protein per day   Fill up on Fiber   Fiber comes from plants--fruits, veggies, whole grains, nuts/seeds and beans   Fiber is low in calories, high in phytonutrients and helps you stay full longer   Aim for 25-35 grams per day by eating fiber with meals and snacks  Eat S-[id number]   Take 20-30 minutes to eat each meal by taking small bites, chewing foods to applesauce consistency or 20-30 times before you swallow   Eating foods too fast can delay satiety/fullness signals and increase overeating   Slow down your eating by using toddler utensils, putting your fork/spoon down between bites and not watching TV or " emailing during meals!   Keep a Journal         Writing down what you eat, how you feel and when you are active helps you identify new changes to work on from week to week         Look for ways to cut 100 calories from your current diet 2-3 times per day  Drink 64 ounces of 0-Calorie drinks between meals   Water   Zero calorie Propel  or Vitamin Water     [employer]  Zero Calories   Crystal Light , Sugar-Free Venu-Aid , and other sugar-free lemonade or flavored levine   Keep Caffeine to less than 300mg per day ie: 3-6oz cups coffee     Work up to 45-60 minutes of physical activity most days of the week   Helps with losing weight and prevent regaining those extra pounds!    Do a combo of cardio (walking/water exercises) and strength training (lifting weights/Vinyasa yoga)    Avoid Mindless Eating   Be present when you eat--take note of the smell, taste and quality of your food   Make a list of alternative activities you could do to prevent eating out of boredom/stress  Go for a walk, call a friend, chew gum, paint your nails, re-organize the garage, etc

## 2024-11-25 NOTE — PROGRESS NOTES
"[facility]    I had the pleasure of seeing Ida when she came for follow up of SVT.  This 54 year old sees Dr. Macias and Dr. Charles for her history of:    Adenosine-responsive SVT  - in ER 6/2023. S/p ablation of inducible LA micro-reentrant tachycardia. Complex, prolonged procedure 8/2023. Started on AC x 1 month afterwards  Rare PVCs   1% on Holter 9/2023  Multiple Sclerosis        Last Visit & Interval History:  Ida saw Diana 1 year ago 9/2023 at which time they reviewed c/o palpitations/\"flutterings\" occurring following ablation for which Holter showed no SVT but PVCs (1%). Sxs seemed to improve after Eliquis was stopped 1 m following LA ablation.  They discussed options including Diltiazem 120 mg vs continued monitor, with plans for 6 m follow-up.      She's subsequently started Zepbound for weight mgmt.    Today's Visit:  Ida thinks things are going well overall!  No prolonged palpitations and really feels that things are under really good control.    She works out 3x/week, working with a  and treadmill for at least 20 minutes. With this, no c/o CP, SOB or decrease in exertional tolerance.    No c/o edema, orthopnea, PND.       VITALS:  Vitals: /76   Pulse 87   Resp 18   Ht 1.753 m (5' 9\")   Wt 112.9 kg (249 lb)   LMP 08/02/2021 (Approximate)   SpO2 100%   BMI 36.77 kg/m      Diagnostic Testing:  Holter monitor 9/14/2023 - SR with average heart rate of 86 bpm ( bpm). 180 events \"fluttering\" SR with PVCs. <1% PVC burden  Stress Echocardiogram 7/2022 - nl LVEF 60-65%. No RWMA. Nl valves. No ischemia/infarction  Echo 12/2019 LVEF 55-60%. No RWMA. LA mild-mod dilated. Mild MAC. Tace MR. 1+TR with RVSP 27+RAP. Nl aortic valve  ZioPatch 12/2019 - SR with ectopy . In SR, avg HR 77 (range  bpm). <1% PACs. <1% PVCs. Sxs correlated with SR, SR/ectopy      Plan:  No EP follow-up required      Assessment/Plan:    SVT  S/p complex ablation of  inducible LA " "micro-reentrant tachycardia 2023  Holter following for \"fluttering\" with sx'c PVCs only, low burden <1%  Currently, she's really doing well, without c/o recurrent palpitations.     PLAN:  Will have her continue to monitor for recurrence, but at this point, no EP follow-up required  Recommended to limit triggers (alcohol, dehydration, THC) and contact us with recurrent arrhythmias or any concerns  No EP follow-up required,       2.  FHx CAD  Reviewed her excellent lifestyle changes - has been losing weight with Zepbound and eating differently. Now working out routinely as well  Reviewed no c/o CP, SOB  Stress echo 2022 with nl LVEF and no stress-induced ischemia    PLAN:  Encouraged to limit saturated fat in diet (notes she's been craving steak since starting Zepbound) and heavily processed foods        Sona Buchanan PA-C, MSPAS      No orders of the defined types were placed in this encounter.    Orders Placed This Encounter   Medications    multivitamin (CENTRUM SILVER) tablet     Sig: Take 1 tablet by mouth daily.    NEW MED     Si teaspoonful. Magnesium chelate 310mg , L-thenine 112mg     Medications Discontinued During This Encounter   Medication Reason    vitamin D2 (ERGOCALCIFEROL) 09469 units (1250 mcg) capsule Therapy completed (No AVS)         No diagnosis found.    CURRENT MEDICATIONS:  Current Outpatient Medications   Medication Sig Dispense Refill    Bacillus Coagulans-Inulin (PROBIOTIC-PREBIOTIC PO)       diazepam (VALIUM) 5 MG tablet TAKE 1-2 TABLETS BY MOUTH ONCE DAILY AS NEEDED FOR ANXIETY      multivitamin (CENTRUM SILVER) tablet Take 1 tablet by mouth daily.      NEW MED 1 teaspoonful. Magnesium chelate 310mg , L-thenine 112mg      Ocrelizumab (OCREVUS IV) Inject into the vein every 6 months      rizatriptan (MAXALT) 10 MG tablet TAKE 1 TABLET BY MOUTH AT ONSET OF MIGRAINE MAY REPEAT 1 TIME IN 24 HOURS. NOT TO EXCEED 2 IN 24 HOURS      tirzepatide-Weight Management (ZEPBOUND) 10 MG/0.5ML " "prefilled pen Inject 0.5 mLs (10 mg) subcutaneously every 7 days 2 mL 2    tiZANidine (ZANAFLEX) 4 MG tablet Take 1 tablet (4 mg) by mouth At Bedtime 90 tablet     metFORMIN (GLUCOPHAGE XR) 500 MG 24 hr tablet TAKE 1 TABLET BY MOUTH AT DINNER FOR 2 WEEKS THEN CAN INCREASE TO 2 TABLETS DAILY AT DINNER (Patient not taking: Reported on 12/2/2024) 60 tablet 0    tirzepatide-Weight Management (ZEPBOUND) 2.5 MG/0.5ML prefilled pen Inject 0.5 mLs (2.5 mg) Subcutaneous every 7 days (Patient not taking: Reported on 12/2/2024) 2 mL 0    tirzepatide-Weight Management (ZEPBOUND) 5 MG/0.5ML prefilled pen Inject 0.5 mLs (5 mg) Subcutaneous every 7 days (Patient not taking: Reported on 12/2/2024) 2 mL 1    tirzepatide-Weight Management (ZEPBOUND) 5 MG/0.5ML prefilled pen Inject 0.5 mLs (5 mg) Subcutaneous every 7 days (Patient not taking: Reported on 12/2/2024) 2 mL 0    ZEPBOUND 12.5 MG/0.5ML prefilled pen Inject 0.5 mLs (12.5 mg) subcutaneously every 7 days. (Patient not taking: Reported on 12/2/2024) 2 mL 2    ZEPBOUND 7.5 MG/0.5ML prefilled pen ADMINISTER 7.5 MG UNDER THE SKIN EVERY 7 DAYS (Patient not taking: Reported on 12/2/2024) 2 mL 1       ALLERGIES     Allergies   Allergen Reactions    Flagyl [Metronidazole] Hives    Miconazole Nitrate Hives     diflucan    Sulfa Antibiotics     Levofloxacin Muscle Pain (Myalgia)         Review of Systems:  Skin:        Eyes:       ENT:       Respiratory:       Cardiovascular:       Gastroenterology:      Genitourinary:       Musculoskeletal:       Neurologic:       Psychiatric:       Heme/Lymph/Imm:       Endocrine:         Physical Exam:  Vitals: /76   Pulse 87   Resp 18   Ht 1.753 m (5' 9\")   Wt 112.9 kg (249 lb)   LMP 08/02/2021 (Approximate)   SpO2 100%   BMI 36.77 kg/m      Constitutional:  cooperative, alert and oriented, well developed, well nourished, in no acute distress        Skin:  warm and dry to the touch, no apparent skin lesions or masses noted        Head: "  normocephalic, no masses or lesions        Eyes:           ENT:  no pallor or cyanosis, dentition good        Neck:  carotid pulses are full and equal bilaterally, JVP normal, no carotid bruit        Chest:  normal breath sounds, clear to auscultation, normal A-P diameter, normal symmetry, normal respiratory excursion, no use of accessory muscles        Cardiac: regular rhythm, normal S1/S2, no S3 or S4, apical impulse not displaced, no murmurs, gallops or rubs                  Abdomen:  abdomen soft        Vascular: pulses full and equal                                      Extremities and Back:  no deformities, clubbing, cyanosis, erythema observed        Neurological:  no gross motor deficits            PAST MEDICAL HISTORY:  Past Medical History:   Diagnosis Date    Duplicated ureter, left     Multiple sclerosis (H) 2017    Seizure (H)     As baby       PAST SURGICAL HISTORY:  Past Surgical History:   Procedure Laterality Date    CYSTECTOMY OVARIAN BENIGN  2010    EP ABLATION SVT N/A 8/17/2023    Procedure: Ablation Supraventricular Tachycardia;  Surgeon: David Macias MD;  Location:  [facility]    IR LUMBAR PUNCTURE  7/7/2016    ZZC REIMPLANT URETER,DUPLICATED URETER  1974       FAMILY HISTORY:  Family History   Problem Relation Age of Onset    Prostate Cancer Father     Cancer Maternal Grandmother         skin cancer    Multiple Sclerosis Brother     Multiple Sclerosis Cousin     Diabetes No family hx of     C.A.D. 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weddings     Employer: SELF   Tobacco Use    Smoking status: Never     Passive exposure: Never    Smokeless tobacco: Never   Substance and Sexual Activity    Alcohol use: Yes     Comment: 1x a month    Drug use: No     Sexual activity: Not Currently     Partners: Male   Other Topics Concern     Service No    Blood Transfusions No    Caffeine Concern No    Occupational Exposure No    Hobby Hazards No    Sleep Concern No    Stress Concern Yes     Comment: second marriage with children issues    Weight Concern Yes    Special Diet No    Exercise Yes     Comment: 2 times per week    Seat Belt Yes     Social Drivers of Health     Financial Resource Strain: Low Risk  (2/16/2023)    Received from [facility]    Overall Financial Resource Strain (CARDIA)     Difficulty of Paying Living Expenses: Not hard at all   Food Insecurity: No Food Insecurity (2/16/2023)    Received from [facility]    Hunger Vital Sign     Worried About Running Out of Food in the Last Year: Never true     Ran Out of Food in the Last Year: Never true   Transportation Needs: No Transportation Needs (2/16/2023)    Received from [facility]    PRAPARE - Transportation     Lack of Transportation (Medical): No     Lack of Transportation (Non-Medical): No   Physical Activity: Insufficiently Active (2/16/2023)    Received from [facility]    Exercise Vital Sign     Days of Exercise per Week: 3 days     Minutes of Exercise per Session: 40 min   Stress: No Stress Concern Present (2/16/2023)    Received from [facility]    [place] [employer] - Occupational Stress Questionnaire     Feeling of Stress : Not at all   Social Connections: Moderately Integrated (2/16/2023)    Received from [facility]    Social Connection and Isolation Panel [NHANES]     Frequency of Communication with Friends and Family: More than three times a week     Frequency of Social Gatherings with Friends and Family: More than three times a week     Attends Catholic Services: Never     Active Member of Clubs or Organizations: Yes     Attends Club or Organization Meetings: More than 4 times per year      Marital Status:    Interpersonal Safety: Not At Risk (2/16/2023)    Received from [facility]    Humiliation, Afraid, Rape, and Kick questionnaire     Fear of Current or Ex-Partner: No     Emotionally Abused: No     Physically Abused: No     Sexually Abused: No   Housing Stability: Low Risk  (2/16/2023)    Received from [facility], [facility]    Housing Stability Vital Sign     Unable to Pay for Housing in the Last Year: No     Number of Places Lived in the Last Year: 0     Unstable Housing in the Last Year: No

## 2024-12-02 ENCOUNTER — OFFICE VISIT (OUTPATIENT)
Dept: CARDIOLOGY | Facility: CLINIC | Age: 54
End: 2024-12-02
Payer: COMMERCIAL

## 2024-12-02 VITALS
RESPIRATION RATE: 18 BRPM | BODY MASS INDEX: 36.88 KG/M2 | HEIGHT: 69 IN | SYSTOLIC BLOOD PRESSURE: 107 MMHG | OXYGEN SATURATION: 100 % | WEIGHT: 249 LBS | DIASTOLIC BLOOD PRESSURE: 76 MMHG | HEART RATE: 87 BPM

## 2024-12-02 DIAGNOSIS — I47.10 SVT (SUPRAVENTRICULAR TACHYCARDIA) (H): ICD-10-CM

## 2024-12-02 DIAGNOSIS — R00.2 PALPITATIONS: Primary | ICD-10-CM

## 2024-12-02 RX ORDER — MULTIVIT WITH MINERALS/LUTEIN
1 TABLET ORAL DAILY
COMMUNITY

## 2024-12-02 NOTE — LETTER
"12/2/2024    Norah Ambrose MD  [address]  [facility] [address]    RE: Ida Gayle       Dear Colleague,     I had the pleasure of seeing Ida Gayle in the [facility].  [facility]    I had the pleasure of seeing Ida when she came for follow up of SVT.  This 54 year old sees Dr. Macias and Dr. Charles for her history of:    Adenosine-responsive SVT  - in ER 6/2023. S/p ablation of inducible LA micro-reentrant tachycardia. Complex, prolonged procedure 8/2023. Started on AC x 1 month afterwards  Rare PVCs   1% on Holter 9/2023  Multiple Sclerosis        Last Visit & Interval History:  Ida saw Diana 1 year ago 9/2023 at which time they reviewed c/o palpitations/\"flutterings\" occurring following ablation for which Holter showed no SVT but PVCs (1%). Sxs seemed to improve after Eliquis was stopped 1 m following LA ablation.  They discussed options including Diltiazem 120 mg vs continued monitor, with plans for 6 m follow-up.      She's subsequently started Zepbound for weight mgmt.    Today's Visit:  Ida thinks things are going well overall!  No prolonged palpitations and really feels that things are under really good control.    She works out 3x/week, working with a  and treadmill for at least 20 minutes. With this, no c/o CP, SOB or decrease in exertional tolerance.    No c/o edema, orthopnea, PND.       VITALS:  Vitals: /76   Pulse 87   Resp 18   Ht 1.753 m (5' 9\")   Wt 112.9 kg (249 lb)   LMP 08/02/2021 (Approximate)   SpO2 100%   BMI 36.77 kg/m      Diagnostic Testing:  Holter monitor 9/14/2023 - SR with average heart rate of 86 bpm ( bpm). 180 events \"fluttering\" SR with PVCs. <1% PVC burden  Stress Echocardiogram 7/2022 - nl LVEF 60-65%. No RWMA. Nl valves. No ischemia/infarction  Echo 12/2019 LVEF 55-60%. No RWMA. LA mild-mod dilated. Mild MAC. Tace MR. 1+TR with RVSP 27+RAP. Nl aortic valve  ZioPatch 12/2019 - SR " "with ectopy . In SR, avg HR 77 (range  bpm). <1% PACs. <1% PVCs. Sxs correlated with SR, SR/ectopy      Plan:  No EP follow-up required      Assessment/Plan:    SVT  S/p complex ablation of  inducible LA micro-reentrant tachycardia 2023  Holter following for \"fluttering\" with sx'c PVCs only, low burden <1%  Currently, she's really doing well, without c/o recurrent palpitations.     PLAN:  Will have her continue to monitor for recurrence, but at this point, no EP follow-up required  Recommended to limit triggers (alcohol, dehydration, THC) and contact us with recurrent arrhythmias or any concerns  No EP follow-up required,       2.  FHx CAD  Reviewed her excellent lifestyle changes - has been losing weight with Zepbound and eating differently. Now working out routinely as well  Reviewed no c/o CP, SOB  Stress echo 2022 with nl LVEF and no stress-induced ischemia    PLAN:  Encouraged to limit saturated fat in diet (notes she's been craving steak since starting Zepbound) and heavily processed foods        Sona Buchanan PA-C, MSPAS      No orders of the defined types were placed in this encounter.    Orders Placed This Encounter   Medications     multivitamin (CENTRUM SILVER) tablet     Sig: Take 1 tablet by mouth daily.     NEW MED     Si teaspoonful. Magnesium chelate 310mg , L-thenine 112mg     Medications Discontinued During This Encounter   Medication Reason     vitamin D2 (ERGOCALCIFEROL) 57985 units (1250 mcg) capsule Therapy completed (No AVS)         No diagnosis found.    CURRENT MEDICATIONS:  Current Outpatient Medications   Medication Sig Dispense Refill     Bacillus Coagulans-Inulin (PROBIOTIC-PREBIOTIC PO)        diazepam (VALIUM) 5 MG tablet TAKE 1-2 TABLETS BY MOUTH ONCE DAILY AS NEEDED FOR ANXIETY       multivitamin (CENTRUM SILVER) tablet Take 1 tablet by mouth daily.       NEW MED 1 teaspoonful. Magnesium chelate 310mg , L-thenine 112mg       Ocrelizumab (OCREVUS IV) Inject into the vein " "every 6 months       rizatriptan (MAXALT) 10 MG tablet TAKE 1 TABLET BY MOUTH AT ONSET OF MIGRAINE MAY REPEAT 1 TIME IN 24 HOURS. NOT TO EXCEED 2 IN 24 HOURS       tirzepatide-Weight Management (ZEPBOUND) 10 MG/0.5ML prefilled pen Inject 0.5 mLs (10 mg) subcutaneously every 7 days 2 mL 2     tiZANidine (ZANAFLEX) 4 MG tablet Take 1 tablet (4 mg) by mouth At Bedtime 90 tablet      metFORMIN (GLUCOPHAGE XR) 500 MG 24 hr tablet TAKE 1 TABLET BY MOUTH AT DINNER FOR 2 WEEKS THEN CAN INCREASE TO 2 TABLETS DAILY AT DINNER (Patient not taking: Reported on 12/2/2024) 60 tablet 0     tirzepatide-Weight Management (ZEPBOUND) 2.5 MG/0.5ML prefilled pen Inject 0.5 mLs (2.5 mg) Subcutaneous every 7 days (Patient not taking: Reported on 12/2/2024) 2 mL 0     tirzepatide-Weight Management (ZEPBOUND) 5 MG/0.5ML prefilled pen Inject 0.5 mLs (5 mg) Subcutaneous every 7 days (Patient not taking: Reported on 12/2/2024) 2 mL 1     tirzepatide-Weight Management (ZEPBOUND) 5 MG/0.5ML prefilled pen Inject 0.5 mLs (5 mg) Subcutaneous every 7 days (Patient not taking: Reported on 12/2/2024) 2 mL 0     ZEPBOUND 12.5 MG/0.5ML prefilled pen Inject 0.5 mLs (12.5 mg) subcutaneously every 7 days. (Patient not taking: Reported on 12/2/2024) 2 mL 2     ZEPBOUND 7.5 MG/0.5ML prefilled pen ADMINISTER 7.5 MG UNDER THE SKIN EVERY 7 DAYS (Patient not taking: Reported on 12/2/2024) 2 mL 1       ALLERGIES     Allergies   Allergen Reactions     Flagyl [Metronidazole] Hives     Miconazole Nitrate Hives     diflucan     Sulfa Antibiotics      Levofloxacin Muscle Pain (Myalgia)         Review of Systems:  Skin:        Eyes:       ENT:       Respiratory:       Cardiovascular:       Gastroenterology:      Genitourinary:       Musculoskeletal:       Neurologic:       Psychiatric:       Heme/Lymph/Imm:       Endocrine:         Physical Exam:  Vitals: /76   Pulse 87   Resp 18   Ht 1.753 m (5' 9\")   Wt 112.9 kg (249 lb)   LMP 08/02/2021 (Approximate)   " SpO2 100%   BMI 36.77 kg/m      Constitutional:  cooperative, alert and oriented, well developed, well nourished, in no acute distress        Skin:  warm and dry to the touch, no apparent skin lesions or masses noted        Head:  normocephalic, no masses or lesions        Eyes:           ENT:  no pallor or cyanosis, dentition good        Neck:  carotid pulses are full and equal bilaterally, JVP normal, no carotid bruit        Chest:  normal breath sounds, clear to auscultation, normal A-P diameter, normal symmetry, normal respiratory excursion, no use of accessory muscles        Cardiac: regular rhythm, normal S1/S2, no S3 or S4, apical impulse not displaced, no murmurs, gallops or rubs                  Abdomen:  abdomen soft        Vascular: pulses full and equal                                      Extremities and Back:  no deformities, clubbing, cyanosis, erythema observed        Neurological:  no gross motor deficits            PAST MEDICAL HISTORY:  Past Medical History:   Diagnosis Date     Duplicated ureter, left      Multiple sclerosis (H) 2017     Seizure (H)     As baby       PAST SURGICAL HISTORY:  Past Surgical History:   Procedure Laterality Date     CYSTECTOMY OVARIAN BENIGN  2010     EP ABLATION SVT N/A 8/17/2023    Procedure: Ablation Supraventricular Tachycardia;  Surgeon: David Macias MD;  Location:  [facility]ATH [facility]     IR LUMBAR PUNCTURE  7/7/2016     ZZC REIMPLANT URETER,DUPLICATED URETER  1974       FAMILY HISTORY:  Family History   Problem Relation Age of Onset     Prostate Cancer Father      Cancer Maternal Grandmother         skin cancer     Multiple Sclerosis Brother      Multiple Sclerosis Cousin      Diabetes No family hx of      C.A.D. No family hx of      Breast Cancer No family hx of      Cancer - colorectal No family hx of        SOCIAL HISTORY:  Social History     Socioeconomic History     Marital status:      Number of children: 1   Occupational  History     Occupation: make- up for [email or web address]     Employer: SELF   Tobacco Use     Smoking status: Never     Passive exposure: Never     Smokeless tobacco: Never   Substance and Sexual Activity     Alcohol use: Yes     Comment: 1x a month     Drug use: No     Sexual activity: Not Currently     Partners: Male   Other Topics Concern      Service No     Blood Transfusions No     Caffeine Concern No     Occupational Exposure No     Hobby Hazards No     Sleep Concern No     Stress Concern Yes     Comment: second marriage with children issues     Weight Concern Yes     Special Diet No     Exercise Yes     Comment: 2 times per week     Seat Belt Yes     Social Drivers of Health     Financial Resource Strain: Low Risk  (2/16/2023)    Received from [facility]    Overall Financial Resource Strain (CARDIA)      Difficulty of Paying Living Expenses: Not hard at all   Food Insecurity: No Food Insecurity (2/16/2023)    Received from [facility]    Hunger Vital Sign      Worried About Running Out of Food in the Last Year: Never true      Ran Out of Food in the Last Year: Never true   Transportation Needs: No Transportation Needs (2/16/2023)    Received from [facility]    PRAPARE - Transportation      Lack of Transportation (Medical): No      Lack of Transportation (Non-Medical): No   Physical Activity: Insufficiently Active (2/16/2023)    Received from [facility]    Exercise Vital Sign      Days of Exercise per Week: 3 days      Minutes of Exercise per Session: 40 min   Stress: No Stress Concern Present (2/16/2023)    Received from [facility]    [employer] [place] [employer] - Occupational Stress Questionnaire      Feeling of Stress : Not at all   Social Connections: Moderately Integrated (2/16/2023)    Received from [facility]    Social Connection and Isolation Panel [NHANES]      Frequency of Communication with Friends and  Family: More than three times a week      Frequency of Social Gatherings with Friends and Family: More than three times a week      Attends Mandaeism Services: Never      Active Member of Clubs or Organizations: Yes      Attends Club or Organization Meetings: More than 4 times per year      Marital Status:    Interpersonal Safety: Not At Risk (2/16/2023)    [facility], [facility]    Humiliation, Afraid, Rape, and Kick questionnaire      Fear of Current or Ex-Partner: No      Emotionally Abused: No      Physically Abused: No      Sexually Abused: No   Housing Stability: Low Risk  (2/16/2023)    Received from [facility], [facility]    Housing Stability Vital Sign      Unable to Pay for Housing in the Last Year: No      Number of Places Lived in the Last Year: 0      Unstable Housing in the Last Year: No               Thank you for allowing me to participate in the care of your patient.      Sincerely,     Clara Buchanan PA-C     [facility]  cc:   Diana Dave, YANA CNP  [address]  [place] [address]

## 2024-12-02 NOTE — PATIENT INSTRUCTIONS
Ida - it was nice to see you and Fabricio today!    Today we reviewed:    Overall you're doing well - no prolonged palpitations, which is great news!  Excellent job with lifestyle management    MEDICATION CHANGES:     NONE    RECOMMENDATIONS:    If working out outside/super hot/sweaty, ensure you're getting some electrolyte replacement in    FOLLOW UP:    No EP follow-up required unless you're having issues!     IMPORTANT PHONE NUMBERS FOR  [facility] ([place]):  Arrhythmia nurses Lara Adams, & Aysha: [phone]

## 2024-12-17 ENCOUNTER — TELEPHONE (OUTPATIENT)
Dept: SURGERY | Facility: CLINIC | Age: 54
End: 2024-12-17
Payer: COMMERCIAL

## 2024-12-17 NOTE — TELEPHONE ENCOUNTER
Retail Pharmacy Prior Authorization Team   Phone: [phone]    PRIOR AUTHORIZATION DENIED    Medication: ZEPBOUND 12.5 MG/0.5ML SC SOAJ  Insurance Company: [employer] [place] - Phone [phone] Fax [phone]  Denial Date: 12/17/2024  Denial Reason(s): WEIGHT LOSS DRUGS ARE EXCLUDED FROM COVERAGE    Appeal Information: N/A  Patient Notified: NO

## 2024-12-29 ENCOUNTER — HEALTH MAINTENANCE LETTER (OUTPATIENT)
Age: 54
End: 2024-12-29

## 2025-03-05 NOTE — PROGRESS NOTES
"Ida is a 54 year old who is being evaluated via a billable video visit.      The patient has been notified of following:     \"This video visit will be conducted via a call between you and your physician/provider. We have found that certain health care needs can be provided without the need for an in-person physical exam.  This service lets us provide the care you need with a video conversation.  If a prescription is necessary we can send it directly to your pharmacy.  If lab work is needed we can place an order for that and you can then stop by our lab to have the test done at a later time.    Video visits are billed at different rates depending on your insurance coverage.  Please reach out to your insurance provider with any questions.    If during the course of the call the physician/provider feels a video visit is not appropriate, you will not be charged for this service.\"    Patient has given verbal consent for Video visit? Yes    How would you like to obtain your AVS? MyChart    If the video visit is dropped, the invitation should be resent by: Text to cell phone: [phone]    Will anyone else be joining your video visit? No    I    Video-Visit Details    Type of service:  Video Visit    Originating Location (pt. Location): Home    Distant Location (provider location):   Off-Site - Provider Home Office    Platform used for Video Visit: Maptia    Video Start Time: 11:05 AM    Video End Time:11:23 AM        Return Medical Weight Management Note       Ida Gayle  MRN:  [id number]  :  1970  SUSI:  3/6/2025        Dear Norah Ambrose MD,    I had the pleasure of seeing your patient Ida Gayle. She is a 54 year old female who I am continuing to see for treatment of obesity related to:        2023    12:07 PM   --   I have the following health issues associated with obesity None of the above         Assessment   Problem List Items Addressed This Visit       Class 1 obesity due to " excess calories with body mass index (BMI) of 34.0 to 34.9 in adult - Primary    Relevant Medications    ZEPBOUND 12.5 MG/0.5ML prefilled pen    Pure hypercholesterolemia    Relevant Medications    ZEPBOUND 12.5 MG/0.5ML prefilled pen          PLAN/DISCUSSION:  Congratulated patient on overall weight loss and lifestyle changes. Emphasized the importance of diet and activity for long term success    Plan:  Continue zepbound 12.5 mg  Start muscle resistance twice weekly       FOLLOW-UP:  July with Ana Leon PA-C      SUBJECTIVE/OBJECTIVE:  Patient last seen on 11/14/2024 and continued on zepbound. Is on the 12.5 mg dose. Has lost an additional 10 lbs since the last visit. Tolerating very well. Might take miralax or senekot prn for constipation.     Is very happy with how things are going. Does pay cash for the zepbound but thinks it is worth it. Reviewed recent vitals and lab.    Met with a  once and was sore for 2 weeks!  Does want to continue but on a slow pace.       Anti-obesity medications:   Current: zepbound 12.5 mg on Thursdays   Side effects: Denies nausea, vomiting, abdominal pain, severe constipation, diarrhea, or heartburn       Antiobesity medication history:  Phentermine (Lomaira, Adipex) Not a candidate due to heart disease    Phentermine/Topiramate (Qsymia)     Bupropion/Naltrexone (Contrave)     Liraglutide (Saxenda)     Semaglutide (Wegovy)     Tirzepatide (Zepbound) Currently taking   Orlistat (Xenical/Mirza)     Off-Label Medications for Obesity  Semaglutide (Ozempic)     Tirzepatide (Mounjaro)     Bupropion (Wellbutrin)   Had side effects including heart beating fast and terrible headache      Metformin(Glucophage) No real weight loss    Topiramate (Topamax) sedation caution with other medications    Naltexone                3/1/2025     8:58 AM   Weight Loss Medication History Reviewed With Patient   Are you having any side effects from the weight loss medication that we have prescribed  you? No       Office Visit on 02/28/2025   Component Date Value Ref Range Status    Carbon Dioxide 02/28/2025 26.1  20 - 32 mmol/L Final    Creatinine 02/28/2025 0.98  0.60 - 1.30 mg/dL Final    Glucose 02/28/2025 83  60 - 99 mg/dL Final    Sodium 02/28/2025 138.4  135 - 146 mmol/L Final    Potassium 02/28/2025 4.37  3.5 - 5.3 mmol/L Final    Chloride 02/28/2025 103.2  98 - 110 mmol/L Final    Protein Total 02/28/2025 6.8  6.1 - 8.1 g/dL Final    Albumin 02/28/2025 4.6  3.6 - 5.1 g/dL Final    Alkaline Phosphatase 02/28/2025 60  33 - 130 U/L Final    ALT 02/28/2025 8  0 - 32 U/L Final    AST 02/28/2025 14  0 - 35 U/L Final    Bilirubin Total 02/28/2025 0.7  0.2 - 1.2 mg/dL Final    Urea Nitrogen 02/28/2025 16  7 - 25 mg/dL Final    Calcium 02/28/2025 9.7  8.6 - 10.3 mg/dL Final    BUN/Creatinine Ratio 02/28/2025 16  6 - 32 Final    Cholesterol 02/28/2025 251 (A)  0 - 199 mg/dL Final    Triglycerides 02/28/2025 74  0 - 149 mg/dL Final    HDL Cholesterol 02/28/2025 83  40 - 150 mg/dL Final    LDL-C 02/28/2025 153 (A)  0 - 129 mg/dL Final    Cholesterol/HDL Ratio 02/28/2025 3  0 - 5 Final       Recent diet changes: B: adds collagen powder to coffee. 30 gms of protein  L: sushi or [place] role  D: chicken or chicken pasta or salmon with sushi rice and green beans   Water: over 60 oz     Recent exercise/activity changes: every morning does yoga and stretches. Adding crunches and walks. Walks on treadmill for 30 minutes 3 days weekly       CURRENT WEIGHT:   239 lbs 0 oz    Initial Weight (lbs): 298 lbs  Last Visits Weight: 247 lb (112 kg)  Cumulative weight loss (lbs): 59  Weight Loss Percentage: 19.8%        3/1/2025     8:58 AM   Changes and Difficulties   I have made the following changes to my diet since my last visit: Yes   I have made the following changes to my activity/exercise since my last visit: Yes yoga stretches   With regards to my activity/exercise, I am still struggling with: Going to the gym  "        MEDICATIONS:   Current Outpatient Medications   Medication Sig Dispense Refill    Bacillus Coagulans-Inulin (PROBIOTIC-PREBIOTIC PO)       diazepam (VALIUM) 5 MG tablet TAKE 1-2 TABLETS BY MOUTH ONCE DAILY AS NEEDED FOR ANXIETY      multivitamin (CENTRUM SILVER) tablet Take 1 tablet by mouth daily.      NEW MED 1 teaspoonful. Magnesium chelate 310mg , L-thenine 112mg      Ocrelizumab (OCREVUS IV) Inject into the vein every 6 months      pemivibart (PEMGARDA) 500 MG/4ML vial Inject into the vein every 3 months.      rizatriptan (MAXALT) 10 MG tablet TAKE 1 TABLET BY MOUTH AT ONSET OF MIGRAINE MAY REPEAT 1 TIME IN 24 HOURS. NOT TO EXCEED 2 IN 24 HOURS      tiZANidine (ZANAFLEX) 2 MG tablet Take 2-4 mg by mouth 2 times daily.      tretinoin (RETIN-A) 0.025 % external cream Apply topically at bedtime.      ZEPBOUND 12.5 MG/0.5ML prefilled pen Inject 0.5 mLs (12.5 mg) subcutaneously every 7 days. 2 mL 3         ROS:  General  Fatigue: No  Sleep Quality: OK      PHYSICAL EXAM:  Objective    Ht 5' 9.75\" (1.772 m)   Wt 239 lb (108.4 kg)   LMP 08/02/2021 (Approximate)   BMI 34.54 kg/m    GENERAL: Healthy, alert and no distress  EYES: Eyes grossly normal to inspection.  No discharge or erythema, or obvious scleral/conjunctival abnormalities.  RESP: No audible wheeze, cough, or visible cyanosis.  No visible retractions or increased work of breathing.    SKIN: Visible skin clear. No significant rash, abnormal pigmentation or lesions.  NEURO: Cranial nerves grossly intact.  Mentation and speech appropriate for age.  PSYCH: Mentation appears normal, affect normal/bright, judgement and insight intact, normal speech and appearance well-groomed.        Sincerely,    Dominguez Red PA-C    24 minutes spent on the date of the encounter doing chart review, review of test results, patient visit and documentation     "

## 2025-03-06 ENCOUNTER — VIRTUAL VISIT (OUTPATIENT)
Dept: SURGERY | Facility: CLINIC | Age: 55
End: 2025-03-06
Payer: COMMERCIAL

## 2025-03-06 VITALS — WEIGHT: 239 LBS | HEIGHT: 70 IN | BODY MASS INDEX: 34.22 KG/M2

## 2025-03-06 DIAGNOSIS — E66.811 CLASS 1 OBESITY DUE TO EXCESS CALORIES WITH SERIOUS COMORBIDITY AND BODY MASS INDEX (BMI) OF 34.0 TO 34.9 IN ADULT: Primary | ICD-10-CM

## 2025-03-06 DIAGNOSIS — E66.09 CLASS 1 OBESITY DUE TO EXCESS CALORIES WITH SERIOUS COMORBIDITY AND BODY MASS INDEX (BMI) OF 34.0 TO 34.9 IN ADULT: Primary | ICD-10-CM

## 2025-03-06 DIAGNOSIS — E78.00 PURE HYPERCHOLESTEROLEMIA: ICD-10-CM

## 2025-03-06 PROBLEM — E66.01 MORBID OBESITY (H): Status: RESOLVED | Noted: 2022-07-12 | Resolved: 2025-03-06

## 2025-03-06 RX ORDER — TIRZEPATIDE 12.5 MG/.5ML
12.5 INJECTION, SOLUTION SUBCUTANEOUS
Qty: 2 ML | Refills: 3 | Status: SHIPPED | OUTPATIENT
Start: 2025-03-06

## 2025-03-06 NOTE — PATIENT INSTRUCTIONS
"Nice to talk with you today! Thank you for allowing me the privilege of caring for you.   We hope we provided you with the excellent service you deserve.     To ensure the quality of our services you may receive a patient satisfaction survey from an independent monitoring company.  The greatest compliment you can give is \"Likely to Recommend\"      Below is our plan we discussed.-  HAL Mix      Plan:  Continue zepbound 12.5 mg  Start muscle resistance twice weekly     Schedule a follow up with Ana Leon PA-C in July.  If you need to reach me sooner you can do so by calling [phone].    Have a great day!     Eat Better ? Move More ? Live Well    Eat 3 nutrient-rich meals each day    Don t skip meals--it will cause you to overeat later in the day!    Eating fiber (vegetables/fruits/whole grains) and protein with meals helps you stay full longer    Choose foods with less than 10 grams of sugar and 5 grams of fat per serving to prevent excess calories and weight gain  Eat around the same times each day to develop a routine eating schedule   Avoid snacking unless physically hungry.   Planned snacks: 1-2 times per day and no more than 150 calories    Eat protein first   Protein helps with healing, maintaining adequate muscle mass, reducing hunger and optimizing nutritional status   Aim for 60-80 grams of protein per day   Fill up on Fiber   Fiber comes from plants--fruits, veggies, whole grains, nuts/seeds and beans   Fiber is low in calories, high in phytonutrients and helps you stay full longer   Aim for 25-35 grams per day by eating fiber with meals and snacks  Eat S-[id number]   Take 20-30 minutes to eat each meal by taking small bites, chewing foods to applesauce consistency or 20-30 times before you swallow   Eating foods too fast can delay satiety/fullness signals and increase overeating   Slow down your eating by using toddler utensils, putting your fork/spoon down between bites and not watching TV or " emailing during meals!   Keep a Journal         Writing down what you eat, how you feel and when you are active helps you identify new changes to work on from week to week         Look for ways to cut 100 calories from your current diet 2-3 times per day  Drink 64 ounces of 0-Calorie drinks between meals   Water   Zero calorie Propel  or Vitamin Water     So[employer]  Zero Calories   Crystal Light , Sugar-Free Venu-Aid , and other sugar-free lemonade or flavored levine   Keep Caffeine to less than 300mg per day ie: 3-6oz cups coffee     Work up to 45-60 minutes of physical activity most days of the week   Helps with losing weight and prevent regaining those extra pounds!    Do a combo of cardio (walking/water exercises) and strength training (lifting weights/Vinyasa yoga)    Avoid Mindless Eating   Be present when you eat--take note of the smell, taste and quality of your food   Make a list of alternative activities you could do to prevent eating out of boredom/stress  Go for a walk, call a friend, chew gum, paint your nails, re-organize the garage, etc

## 2025-04-02 ENCOUNTER — TELEPHONE (OUTPATIENT)
Dept: SURGERY | Facility: CLINIC | Age: 55
End: 2025-04-02
Payer: COMMERCIAL

## 2025-04-02 NOTE — TELEPHONE ENCOUNTER
PRIOR AUTHORIZATION DENIED    Medication: TIRZEPATIDE-WEIGHT MANAGEMENT 10 MG/0.5ML SC SOAJ  Insurance Company: [employer] [place] - Phone [phone] Fax [phone]  Denial Date: 4/2/2025  Denial Reason(s):     Appeal Information:     Patient Notified: No

## 2025-06-26 DIAGNOSIS — E66.811 CLASS 1 OBESITY DUE TO EXCESS CALORIES WITH SERIOUS COMORBIDITY AND BODY MASS INDEX (BMI) OF 34.0 TO 34.9 IN ADULT: ICD-10-CM

## 2025-06-26 DIAGNOSIS — E78.00 PURE HYPERCHOLESTEROLEMIA: ICD-10-CM

## 2025-06-26 DIAGNOSIS — E66.09 CLASS 1 OBESITY DUE TO EXCESS CALORIES WITH SERIOUS COMORBIDITY AND BODY MASS INDEX (BMI) OF 34.0 TO 34.9 IN ADULT: ICD-10-CM

## 2025-06-26 DIAGNOSIS — E88.819 INSULIN RESISTANCE: ICD-10-CM

## 2025-06-26 RX ORDER — TIRZEPATIDE 10 MG/.5ML
INJECTION, SOLUTION SUBCUTANEOUS
Qty: 2 ML | Refills: 3 | Status: SHIPPED | OUTPATIENT
Start: 2025-06-26

## 2025-07-09 NOTE — PROGRESS NOTES
"Ida is a 55 year old who is being evaluated via a billable video visit.      The patient has been notified of following:     \"This video visit will be conducted via a call between you and your physician/provider. We have found that certain health care needs can be provided without the need for an in-person physical exam.  This service lets us provide the care you need with a video conversation.  If a prescription is necessary we can send it directly to your pharmacy.  If lab work is needed we can place an order for that and you can then stop by our lab to have the test done at a later time.    Video visits are billed at different rates depending on your insurance coverage.  Please reach out to your insurance provider with any questions.    If during the course of the call the physician/provider feels a video visit is not appropriate, you will not be charged for this service.\"    Patient has given verbal consent for Video visit? Yes    How would you like to obtain your AVS? MyChart    If the video visit is dropped, the invitation should be resent by: Text to cell phone: [phone]    Will anyone else be joining your video visit? No    I    Video-Visit Details    Type of service:  Video Visit    Originating Location (pt. Location): Home in [place]     Distant Location (provider location):   [facility]    Platform used for Video Visit: [employer]    Video Start Time: 10:52 AM    Video End Time:11:08 AM    7/10/2025      Return Medical Weight Management Note     Ida Gayle  MRN:  [id number]  :  1970    Assessment & Plan   Problem List Items Addressed This Visit       Pure hypercholesterolemia    This is a weight related comorbidity that I would anticipate to improve with weight management.           Class 2 severe obesity with serious comorbidity and body mass index (BMI) of 35.0 to 35.9 in adult, unspecified obesity type (H) - Primary    Patient was congratulated on " wt loss success thus far. Healthy habits to assist with further weight loss were discussed. Ida will continue Zepbound and increase to 12.5 mg Zepbound vials when available. We reviewed recommendations for muscle conservation including eating three meals daily, good protein intake, and strength training.                Antiobesity medication history/notes from prior:  Phentermine (Lomaira, Adipex) Not a candidate due to heart disease    Phentermine/Topiramate (Qsymia)     Bupropion/Naltrexone (Contrave)     Liraglutide (Saxenda)     Semaglutide (Wegovy)     Tirzepatide (Zepbound) Currently taking   Orlistat (Xenical/Mirza)     Off-Label Medications for Obesity  Semaglutide (Ozempic)     Tirzepatide (Mounjaro)     Bupropion (Wellbutrin)   Had side effects including heart beating fast and terrible headache      Metformin(Glucophage) No real weight loss    Topiramate (Topamax) sedation caution with other medications    Naltexone          PATIENT INSTRUCTIONS:  Pt Instructions:  Plan to increase 12.5 mg Zepbound weekly. I will send this with 3 refills as soon as we can for the Zepbound vials from [facility].    Goals:  Focus on healthy food choices - prioritizing protein and veggies in your meals. I recommend eating every 4-6 hours to reduce the risk of low blood sugars and try to minimize snacking between meals. Stay well hydrated though the day as well.  Great job with exercising - please continue to invest in yourself with regular exercise. Continue to strive for a range for 150-300 minutes of exercise for weight maintenance and incorporating strength training twice-three times a week to help preserve muscle!        Follow up:    Call [phone] to schedule next visit in next available in 3 months. Be in touch on [employer] for refills/concerns between visits    20 minutes spent on the date of the encounter doing chart review, history and exam, result review, counseling, developing plan of care, documentation, and  further activities as noted      The longitudinal plan of care for the diagnosis(es)/condition(s) as documented were addressed during this visit. Due to the added complexity in care, I will continue to support Ida in the subsequent management and with ongoing continuity of care.      INTERVAL HISTORY:  Ida returns for medical weight management follow up.  Last seen on 3/6/2025 by Dominguez Red PA-C and plan at that time to continue 12.5 mg Zepbound    6/26/25 10 mg Zepbound sent by Dominguez Red PA-C - She stated that she was doing really well at 12.5 mg but had been expensive so went down to 10 mg dose with the [employer] vials.    She is hoping to increase the dose again today as she heard there are the 12.5 mg vials now. She did have a 9 lb weight gain w/the dose reduction but also feels this is influenced by some increased fatigue/not feeling well prior to last MS infusion. She generally has noted a marked improvement in her MS with Zepbound as well which is super exciting. Frustrating though that there isn't better insurance coverage for it from a cost perspective.     Tolerating well right now - no side effects currently.    Eating patterns:  Mornings - collegen powder with 20 g protein in coffee  Avocado toast mornings can add cottage cheese  Lunch - protein or salad/protein  Dinner - filet and green beans  Evening protein shake    80-90 g of protein daily for muscle conservation.    Weights/exercising as well.      WEIGHT METRICS:  Body mass index is 35.26 kg/m .   Current Weight: 244 lb (110.7 kg)  Last Visits Weight: 239 lb (108.4 kg)  Initial Weight (lbs): 298 lbs  Cumulative weight loss (lbs): 54  Weight Loss Percentage: 18.12%    Wt Readings from Last 10 Encounters:   07/10/25 244 lb (110.7 kg)   03/06/25 239 lb (108.4 kg)   02/28/25 239 lb 9.6 oz (108.7 kg)   12/02/24 249 lb (112.9 kg)   11/14/24 247 lb (112 kg)   06/27/24 256 lb (116.1 kg)   03/14/24 274 lb (124.3 kg)   01/23/24 296 lb  (134.3 kg)   01/04/24 294 lb (133.4 kg)   09/26/23 298 lb (135.2 kg)                 7/5/2025     6:09 PM   Weight Loss Medication History Reviewed With Patient   Are you having any side effects from the weight loss medication that we have prescribed you? No           3/1/2025     8:58 AM   Changes and Difficulties   I have made the following changes to my diet since my last visit: Yes   I have made the following changes to my activity/exercise since my last visit: Yes yoga stretches   With regards to my activity/exercise, I am still struggling with: Going to the gym       LABS:  Hemoglobin A1C   Date Value Ref Range Status   09/27/2023 5.2 4.0 - 7.0 % Final     Creatinine   Date Value Ref Range Status   02/28/2025 0.98 0.60 - 1.30 mg/dL Final     GFR Estimate   Date Value Ref Range Status   08/17/2023 >90 >60 mL/min/1.73m2 Final   12/05/2019 71 >60 mL/min/[1.73_m2] Final     Carbon Dioxide   Date Value Ref Range Status   02/28/2025 26.1 20 - 32 mmol/L Final     Chloride   Date Value Ref Range Status   02/28/2025 103.2 98 - 110 mmol/L Final     Urea Nitrogen   Date Value Ref Range Status   02/28/2025 16 7 - 25 mg/dL Final     BUN/Creatinine Ratio   Date Value Ref Range Status   02/28/2025 16 6 - 32 Final     ALT   Date Value Ref Range Status   03/13/2017 10 0 - 32 U/L Final     AST   Date Value Ref Range Status   03/13/2017 10 0 - 40 U/L Final     TSH   Date Value Ref Range Status   07/12/2022 1.92 mIU/L Final     Comment:               Reference Range                         > or = 20 Years  0.40-4.50                              Pregnancy Ranges            First trimester    0.26-2.66            Second trimester   0.55-2.73            Third trimester    0.43-2.91       LDL-C   Date Value Ref Range Status   02/28/2025 153 (A) 0 - 129 mg/dL Final        BP Readings from Last 6 Encounters:   02/28/25 118/84   12/02/24 107/76   01/23/24 (!) 136/93   01/04/24 (!) 134/94   11/01/23 (!) 147/89   09/26/23 116/76      "  Pulse Readings from Last 6 Encounters:   02/28/25 80   12/02/24 87   01/23/24 97   01/04/24 74   11/01/23 100   09/26/23 86       PE:  Ht 5' 9.75\" (1.772 m)   Wt 244 lb (110.7 kg)   LMP 08/02/2021 (Approximate)   BMI 35.26 kg/m    GENERAL: Healthy, alert and no distress  EYES: Eyes grossly normal to inspection.    RESP: No audible wheeze, cough, or visible cyanosis.  No increased work of breathing.    SKIN: Visible skin clear. No significant rash, abnormal pigmentation or lesions.  NEURO: Mentation and speech appropriate for age.  PSYCH: Mentation appears normal, affect normal/bright, judgement and insight intact, normal speech and appearance well-groomed.      Sincerely,      Ana Leon PA-C       "

## 2025-07-10 ENCOUNTER — VIRTUAL VISIT (OUTPATIENT)
Dept: SURGERY | Facility: CLINIC | Age: 55
End: 2025-07-10
Payer: COMMERCIAL

## 2025-07-10 VITALS — BODY MASS INDEX: 34.93 KG/M2 | HEIGHT: 70 IN | WEIGHT: 244 LBS

## 2025-07-10 DIAGNOSIS — E66.812 CLASS 2 SEVERE OBESITY WITH SERIOUS COMORBIDITY AND BODY MASS INDEX (BMI) OF 35.0 TO 35.9 IN ADULT, UNSPECIFIED OBESITY TYPE (H): Primary | ICD-10-CM

## 2025-07-10 DIAGNOSIS — E78.00 PURE HYPERCHOLESTEROLEMIA: ICD-10-CM

## 2025-07-10 DIAGNOSIS — E66.01 CLASS 2 SEVERE OBESITY WITH SERIOUS COMORBIDITY AND BODY MASS INDEX (BMI) OF 35.0 TO 35.9 IN ADULT, UNSPECIFIED OBESITY TYPE (H): Primary | ICD-10-CM

## 2025-07-10 PROBLEM — E66.09 CLASS 1 OBESITY DUE TO EXCESS CALORIES WITH BODY MASS INDEX (BMI) OF 34.0 TO 34.9 IN ADULT: Status: RESOLVED | Noted: 2021-08-05 | Resolved: 2025-07-10

## 2025-07-10 PROBLEM — E66.811 CLASS 1 OBESITY DUE TO EXCESS CALORIES WITH BODY MASS INDEX (BMI) OF 34.0 TO 34.9 IN ADULT: Status: RESOLVED | Noted: 2021-08-05 | Resolved: 2025-07-10

## 2025-07-10 NOTE — PATIENT INSTRUCTIONS
Nice to talk with you today. Below is the plan discussed.-  Ana Leon, PAHayderC     Pt Instructions:  Plan to increase 12.5 mg Zepbound weekly. I will send this with 3 refills as soon as we can for the Zepbound vials from [facility].    Goals:  Focus on healthy food choices - prioritizing protein and veggies in your meals. I recommend eating every 4-6 hours to reduce the risk of low blood sugars and try to minimize snacking between meals. Stay well hydrated though the day as well.  Great job with exercising - please continue to invest in yourself with regular exercise. Continue to strive for a range for 150-300 minutes of exercise for weight maintenance and incorporating strength training twice-three times a week to help preserve muscle!        Follow up:    Call [phone] to schedule next visit in next available in 3 months. Be in touch on [employer] for refills/concerns between visits

## 2025-07-10 NOTE — ASSESSMENT & PLAN NOTE
Patient was congratulated on wt loss success thus far. Healthy habits to assist with further weight loss were discussed. Ida will continue Zepbound and increase to 12.5 mg Zepbound vials when available. We reviewed recommendations for muscle conservation including eating three meals daily, good protein intake, and strength training.

## 2025-07-10 NOTE — Clinical Note
Ida needs to increase to 12.5 mg Zepbound vials but it's not avaialble yet in Epic. She just did last dose of 10 mg on Wednesday - are we able to do some sort of faxed written script or verbal order?  - Ana Leon, PAHayderC

## 2025-07-24 ENCOUNTER — OFFICE VISIT (OUTPATIENT)
Dept: FAMILY MEDICINE | Facility: CLINIC | Age: 55
End: 2025-07-24

## 2025-07-24 VITALS
TEMPERATURE: 97 F | HEART RATE: 86 BPM | SYSTOLIC BLOOD PRESSURE: 120 MMHG | DIASTOLIC BLOOD PRESSURE: 82 MMHG | OXYGEN SATURATION: 99 %

## 2025-07-24 DIAGNOSIS — N89.8 VAGINAL IRRITATION: ICD-10-CM

## 2025-07-24 DIAGNOSIS — N95.2 ATROPHIC VAGINITIS: ICD-10-CM

## 2025-07-24 DIAGNOSIS — N30.00 ACUTE CYSTITIS WITHOUT HEMATURIA: Primary | ICD-10-CM

## 2025-07-24 DIAGNOSIS — B37.31 CANDIDIASIS OF VAGINA: ICD-10-CM

## 2025-07-24 DIAGNOSIS — R30.0 DYSURIA: ICD-10-CM

## 2025-07-24 LAB
APPEARANCE UR: ABNORMAL
BACTERIA URINE: NORMAL
BACTERIA, UR: ABNORMAL
BILIRUB UR QL: ABNORMAL
CASTS/LPF: ABNORMAL
CLUE CELLS: NEGATIVE
COLOR UR: YELLOW
EP/HPF: ABNORMAL
GLUCOSE URINE: ABNORMAL MG/DL
HGB UR QL: ABNORMAL
KETONES UR QL: ABNORMAL MG/DL
MISC.: ABNORMAL
NITRITE UR QL STRIP: ABNORMAL
PH UR STRIP: 7 PH (ref 5–7)
PH WET PREP: 4 (ref 3.5–4.5)
PMNS (WET PREP): NORMAL
PROT UR QL: ABNORMAL MG/DL
RBC, UR MICRO: ABNORMAL (ref ?–2)
SP GR UR STRIP: 1.01
TRICHOMONAS VAGINALS: NORMAL
UROBILINOGEN UR QL STRIP: 0.2 EU/DL (ref 0.2–1)
WBC #/AREA URNS HPF: ABNORMAL /[HPF]
WBC, UR MICRO: ABNORMAL (ref ?–2)
YEAST CELLS: NORMAL

## 2025-07-24 RX ORDER — FLUCONAZOLE 150 MG/1
150 TABLET ORAL ONCE
Qty: 2 TABLET | Refills: 0 | Status: SHIPPED | OUTPATIENT
Start: 2025-07-24 | End: 2025-07-24

## 2025-07-24 RX ORDER — CEPHALEXIN 500 MG/1
500 CAPSULE ORAL 2 TIMES DAILY
Qty: 14 CAPSULE | Refills: 0 | Status: SHIPPED | OUTPATIENT
Start: 2025-07-24 | End: 2025-07-31

## 2025-07-24 NOTE — NURSING NOTE
Chief Complaint   Patient presents with    Consult     UTI/yeast inf - symptoms for 3 weeks now - has MS, on Ocrevus - always gets yeast infections after inj, took 2 doses of fluconazole which were not effective, also tried monistat. Wants rx for yeast infection gel.  Bladder infection for 3-4 weeks - dysuria, urgency, frequency. Concerned about kidney infection - was experiencing chills and fever.      Pre-visit Screening:  Immunizations:  up to date  Colonoscopy:  is up to date  Mammogram: is up to date  Asthma Action Test/Plan:  na  PHQ9:  na  GAD7:  na  Questioned patient about current smoking habits Pt. has never smoked.  Ok to leave detailed message on voice mail for today's visit only yes, phone # [phone] (home)

## 2025-07-24 NOTE — PROGRESS NOTES
CC: UTI?, yeast infection?    History:  4 weeks ago, Ida started to noticed increased urinary frequency and some vaginal irritation. Did 2 courses of Monistat 7 days. Then did take 2 doses of fluconazole from oncologist as well as she is prone to infection from her Ocrevus for MS. Does seem like irritation comes and goes. Denies any obvious vaginal discharge. Continues to have urinary frequency, and this morning work up with more pelvic pressure in general. Is sexually active, but denies any new exposures. No new back pain, but difficult to tell with MS chronic back pain. Pt does usually see [facility]as [facility], but her next appt was 8/7/25, so came here instead. Had hysterectomy approximately 3 years ago, then needed bladder sling.     Of note is that pt has MS. Does infusions of Ocrevus every 6 months 2-3 weeks ago.     PMH, MEDICATIONS, ALLERGIES, SOCIAL AND FAMILY HISTORY in EPIC and reviewed by me personally.    ROS negative other than the symptoms noted above in the HPI.      Examination   /82 (BP Location: Left arm, Patient Position: Sitting, Cuff Size: Adult Large)   Pulse 86   Temp 97  F (36.1  C) (Temporal)   LMP 08/02/2021 (Approximate)   SpO2 99%      Constitutional: Sitting comfortably, in no acute distress. Vital signs noted  Neck:  no adenopathy, trachea midline and normal to palpation, thyroid normal to palpation  Cardiovascular:  regular rate and rhythm, no murmurs, clicks, or gallops  Respiratory:  normal respiratory rate and rhythm, lungs clear to auscultation  : External genitalia with mild erythema in fold of labia majora. No edema. Vaginal canal with mild-moderate edema, minimal thin white discharge. Cervix intact without abnormality. No cervical motion tenderness or adnexal mass on bimanual exam.   SKIN: No jaundice/pallor/rash.   Psychiatric: mentation appears normal and affect normal/bright      A/P    ICD-10-CM    1. Acute cystitis without hematuria  N30.00  URINE CULTURE AEROBIC BACTERIAL ([facility])     cephALEXin (KEFLEX) 500 MG capsule      2. Atrophic vaginitis  N95.2 conjugated estrogens (PREMARIN) 0.625 MG/GM vaginal cream      3. Dysuria  R30.0 URINALYSIS, ROUTINE ([facility])      4. Vaginal irritation  N89.8 WET PREP ([facility])      5. Candidiasis of vagina  B37.31 fluconazole (DIFLUCAN) 150 MG tablet          DISCUSSION:  Acute cystitis:  UA and symptoms consistent with cystitis. Recommended keflex twice daily for 7 days. Take with food, monitor for side effects. Consider probiotic, or live culture yogurt between doses. Will order UC to confirm infection, determine susceptibilities, and change antibiotic if needed. Will contact pt with result when available.     Also given general erythema on exam, but no infection on wet prep, suspect atrophic vaginitis contributing to symptoms. Recommended OTC therapist with [facility], but could also consider trial of topical estrogen. Prescribed topical estrogen to apply the external labia as well as vaginal canal twice weekly.     follow up visit: As needed    Dulce Petit PA-C  [facility]

## 2025-07-26 LAB — URINE VOIDED CULTURE: ABNORMAL

## 2025-08-04 ENCOUNTER — MYC MEDICAL ADVICE (OUTPATIENT)
Dept: FAMILY MEDICINE | Facility: CLINIC | Age: 55
End: 2025-08-04

## 2025-08-04 DIAGNOSIS — N95.2 ATROPHIC VAGINITIS: Primary | ICD-10-CM

## 2025-08-24 ENCOUNTER — MYC MEDICAL ADVICE (OUTPATIENT)
Dept: CARDIOLOGY | Facility: CLINIC | Age: 55
End: 2025-08-24
Payer: COMMERCIAL

## 2025-08-24 DIAGNOSIS — R00.2 PALPITATIONS: Primary | ICD-10-CM

## 2025-08-26 ENCOUNTER — ALLIED HEALTH/NURSE VISIT (OUTPATIENT)
Dept: CARDIOLOGY | Facility: CLINIC | Age: 55
End: 2025-08-26
Attending: PHYSICIAN ASSISTANT
Payer: COMMERCIAL

## 2025-08-26 DIAGNOSIS — R00.2 PALPITATIONS: ICD-10-CM

## 2025-08-26 PROCEDURE — 99207 PR NO CHARGE LOS: CPT

## (undated) DEVICE — GUIDEWIRE TRNSEPT 0.014 X35CM SAFE SE

## (undated) DEVICE — CATH EP CATH EP REPRO DAIG RSPN FX CRV DX EP C

## (undated) DEVICE — INTRODUCER SHEATH FAST-CATH 9FRX12CM 406116

## (undated) DEVICE — DEFIB PRO-PADZ LVP LQD GEL ADULT 8900-2105-01

## (undated) DEVICE — INTRO CATH 12CM 8.5FR FST-CATH

## (undated) DEVICE — NDL TRANSSEPTAL BRK 18GA 71CM BRK CVD 407200

## (undated) DEVICE — INTRODUCER SHEATH FAST-CATH SWARTZ 8.5FRX63CM SL1 CVD 406849

## (undated) DEVICE — CATH THERMOCOOL SMARTTOUCH SF FJ CURVE

## (undated) DEVICE — RAD INTRODUCER KIT MICRO 5FRX10CM .018 NITINOL G/W

## (undated) DEVICE — PACK EP SRG PROC LF DISP SAN32EPFSR

## (undated) DEVICE — CATHETER OCTARAY LONG SPLINE CURVE F 3-3-3-3-3 D160906

## (undated) DEVICE — INTRODUCER SHEATH GREEN 6.5FRX11CM .038IN PSI-6F-11-038ACT

## (undated) DEVICE — CATH SOUNDSTAR 8FRX90CM 10439011

## (undated) DEVICE — TUBE SET SMARKABLATE IRRIGATION

## (undated) DEVICE — CATH EP 6FR 2MM TIP 2-8-2 115C

## (undated) RX ORDER — FENTANYL CITRATE 50 UG/ML
INJECTION, SOLUTION INTRAMUSCULAR; INTRAVENOUS
Status: DISPENSED
Start: 2023-08-17

## (undated) RX ORDER — HEPARIN SODIUM 1000 [USP'U]/ML
INJECTION, SOLUTION INTRAVENOUS; SUBCUTANEOUS
Status: DISPENSED
Start: 2023-08-17

## (undated) RX ORDER — KETOROLAC TROMETHAMINE 30 MG/ML
INJECTION, SOLUTION INTRAMUSCULAR; INTRAVENOUS
Status: DISPENSED
Start: 2023-08-17

## (undated) RX ORDER — LIDOCAINE HYDROCHLORIDE 10 MG/ML
INJECTION, SOLUTION EPIDURAL; INFILTRATION; INTRACAUDAL; PERINEURAL
Status: DISPENSED
Start: 2023-08-17

## (undated) RX ORDER — PROTAMINE SULFATE 10 MG/ML
INJECTION, SOLUTION INTRAVENOUS
Status: DISPENSED
Start: 2023-08-17

## (undated) RX ORDER — LIDOCAINE HYDROCHLORIDE 10 MG/ML
INJECTION, SOLUTION EPIDURAL; INFILTRATION; INTRACAUDAL; PERINEURAL
Status: DISPENSED
Start: 2023-11-01

## (undated) RX ORDER — ADENOSINE 3 MG/ML
INJECTION, SOLUTION INTRAVENOUS
Status: DISPENSED
Start: 2023-08-17

## (undated) RX ORDER — DIPHENHYDRAMINE HYDROCHLORIDE 50 MG/ML
INJECTION INTRAMUSCULAR; INTRAVENOUS
Status: DISPENSED
Start: 2023-08-17